# Patient Record
Sex: FEMALE | Race: WHITE | NOT HISPANIC OR LATINO | Employment: OTHER | URBAN - METROPOLITAN AREA
[De-identification: names, ages, dates, MRNs, and addresses within clinical notes are randomized per-mention and may not be internally consistent; named-entity substitution may affect disease eponyms.]

---

## 2017-03-01 DIAGNOSIS — D32.0 BENIGN NEOPLASM OF CEREBRAL MENINGES (HCC): Primary | ICD-10-CM

## 2017-04-13 ENCOUNTER — TRANSCRIBE ORDERS (OUTPATIENT)
Dept: CARDIOLOGY | Facility: CLINIC | Age: 67
End: 2017-04-13

## 2017-04-13 ENCOUNTER — TRANSCRIBE ORDERS (OUTPATIENT)
Dept: ADMINISTRATIVE | Facility: HOSPITAL | Age: 67
End: 2017-04-13

## 2017-04-13 DIAGNOSIS — Z87.891 FORMER SMOKER: Primary | ICD-10-CM

## 2017-04-13 DIAGNOSIS — R07.9 ACUTE CHEST PAIN: Primary | ICD-10-CM

## 2017-04-20 ENCOUNTER — HOSPITAL ENCOUNTER (OUTPATIENT)
Dept: CT IMAGING | Facility: HOSPITAL | Age: 67
Discharge: HOME | End: 2017-04-20
Attending: FAMILY MEDICINE

## 2017-04-20 ENCOUNTER — HOSPITAL ENCOUNTER (OUTPATIENT)
Dept: CARDIOLOGY | Facility: HOSPITAL | Age: 67
Discharge: HOME | End: 2017-04-20
Attending: FAMILY MEDICINE | Admitting: FAMILY MEDICINE

## 2017-04-20 DIAGNOSIS — Z87.891 FORMER SMOKER: ICD-10-CM

## 2017-04-20 DIAGNOSIS — R07.9 ACUTE CHEST PAIN: ICD-10-CM

## 2017-04-20 LAB
BH CV ECHO MEAS - ACS: 2 CM
BH CV ECHO MEAS - AO MAX PG: 4.2 MMHG
BH CV ECHO MEAS - AO ROOT AREA (BSA CORRECTED): 1.9
BH CV ECHO MEAS - AO ROOT AREA: 7.9 CM^2
BH CV ECHO MEAS - AO ROOT DIAM: 3.2 CM
BH CV ECHO MEAS - AO V2 MAX: 102.6 CM/SEC
BH CV ECHO MEAS - BSA(HAYCOCK): 1.7 M^2
BH CV ECHO MEAS - BSA: 1.7 M^2
BH CV ECHO MEAS - BZI_BMI: 21.1 KILOGRAMS/M^2
BH CV ECHO MEAS - BZI_METRIC_HEIGHT: 170.2 CM
BH CV ECHO MEAS - BZI_METRIC_WEIGHT: 61.2 KG
BH CV ECHO MEAS - CONTRAST EF 4CH: 55 ML/M^2
BH CV ECHO MEAS - EDV(CUBED): 125.8 ML
BH CV ECHO MEAS - EDV(MOD-SP4): 80 ML
BH CV ECHO MEAS - EDV(TEICH): 118.9 ML
BH CV ECHO MEAS - EF(CUBED): 60.2 %
BH CV ECHO MEAS - EF(MOD-SP4): 55 %
BH CV ECHO MEAS - EF(TEICH): 51.5 %
BH CV ECHO MEAS - ESV(CUBED): 50.1 ML
BH CV ECHO MEAS - ESV(MOD-SP4): 36 ML
BH CV ECHO MEAS - ESV(TEICH): 57.6 ML
BH CV ECHO MEAS - FS: 26.4 %
BH CV ECHO MEAS - IVS/LVPW: 1
BH CV ECHO MEAS - IVSD: 1.1 CM
BH CV ECHO MEAS - LAT PEAK E' VEL: 5 CM/SEC
BH CV ECHO MEAS - LV DIASTOLIC VOL/BSA (35-75): 46.8 ML/M^2
BH CV ECHO MEAS - LV MASS(C)D: 201.1 GRAMS
BH CV ECHO MEAS - LV MASS(C)DI: 117.6 GRAMS/M^2
BH CV ECHO MEAS - LV SYSTOLIC VOL/BSA (12-30): 21 ML/M^2
BH CV ECHO MEAS - LVIDD: 5 CM
BH CV ECHO MEAS - LVIDS: 3.7 CM
BH CV ECHO MEAS - LVLD AP4: 6.9 CM
BH CV ECHO MEAS - LVLS AP4: 6 CM
BH CV ECHO MEAS - LVOT AREA (M): 2.5 CM^2
BH CV ECHO MEAS - LVOT AREA: 2.6 CM^2
BH CV ECHO MEAS - LVOT DIAM: 1.8 CM
BH CV ECHO MEAS - LVPWD: 1.1 CM
BH CV ECHO MEAS - MED PEAK E' VEL: 7 CM/SEC
BH CV ECHO MEAS - MR MAX PG: 59.8 MMHG
BH CV ECHO MEAS - MR MAX VEL: 386.7 CM/SEC
BH CV ECHO MEAS - MV A DUR: 0.12 SEC
BH CV ECHO MEAS - MV A MAX VEL: 65.5 CM/SEC
BH CV ECHO MEAS - MV DEC SLOPE: 220.8 CM/SEC^2
BH CV ECHO MEAS - MV DEC TIME: 0.23 SEC
BH CV ECHO MEAS - MV E MAX VEL: 54.3 CM/SEC
BH CV ECHO MEAS - MV E/A: 0.83
BH CV ECHO MEAS - MV MAX PG: 2.1 MMHG
BH CV ECHO MEAS - MV MEAN PG: 0.7 MMHG
BH CV ECHO MEAS - MV P1/2T MAX VEL: 55.3 CM/SEC
BH CV ECHO MEAS - MV P1/2T: 73.3 MSEC
BH CV ECHO MEAS - MV V2 MAX: 71.7 CM/SEC
BH CV ECHO MEAS - MV V2 MEAN: 37.3 CM/SEC
BH CV ECHO MEAS - MV V2 VTI: 25.2 CM
BH CV ECHO MEAS - MVA P1/2T LCG: 4 CM^2
BH CV ECHO MEAS - MVA(P1/2T): 3 CM^2
BH CV ECHO MEAS - PULM A REVS DUR: 0.11 SEC
BH CV ECHO MEAS - PULM A REVS VEL: 34 CM/SEC
BH CV ECHO MEAS - PULM DIAS VEL: 65 CM/SEC
BH CV ECHO MEAS - PULM S/D: 0.62
BH CV ECHO MEAS - PULM SYS VEL: 40.3 CM/SEC
BH CV ECHO MEAS - SI(CUBED): 44.3 ML/M^2
BH CV ECHO MEAS - SI(MOD-SP4): 25.7 ML/M^2
BH CV ECHO MEAS - SI(TEICH): 35.8 ML/M^2
BH CV ECHO MEAS - SV(CUBED): 75.7 ML
BH CV ECHO MEAS - SV(MOD-SP4): 44 ML
BH CV ECHO MEAS - SV(TEICH): 61.2 ML
BH CV ECHO MEAS - TAPSE (>1.6): 2 CM2
BH CV ECHO MEAS - TR MAX VEL: 270.2 CM/SEC
BH CV STRESS DURATION MIN STAGE 1: 1
BH CV STRESS DURATION SEC STAGE 1: 24
BH CV STRESS GRADE STAGE 1: 10
BH CV STRESS HR STAGE 1: 112
BH CV STRESS METS STAGE 1: 5
BH CV STRESS PROTOCOL 1: NORMAL
BH CV STRESS SPEED STAGE 1: 1.7
BH CV STRESS STAGE 1: 1
BH CV XLRA - RV BASE: 3 CM
BH CV XLRA - TDI S': 14 CM/SEC
E/E' RATIO: 9
LEFT ATRIUM VOLUME INDEX: 26 ML/M2
LV EF 2D ECHO EST: 55 %
MAXIMAL PREDICTED HEART RATE: 155 BPM
PERCENT MAX PREDICTED HR: 72.26 %
SINUS: 3.2 CM
STJ: 2.3 CM
STRESS BASELINE BP: NORMAL MMHG
STRESS BASELINE HR: 79 BPM
STRESS PERCENT HR: 85 %
STRESS POST EXERCISE DUR MIN: 1 MIN
STRESS POST EXERCISE DUR SEC: 24 SEC
STRESS POST PEAK HR: 112 BPM
STRESS TARGET HR: 132 BPM

## 2017-04-20 PROCEDURE — 93325 DOPPLER ECHO COLOR FLOW MAPG: CPT

## 2017-04-20 PROCEDURE — 93017 CV STRESS TEST TRACING ONLY: CPT

## 2017-04-20 PROCEDURE — 93325 DOPPLER ECHO COLOR FLOW MAPG: CPT | Performed by: INTERNAL MEDICINE

## 2017-04-20 PROCEDURE — G0297 LDCT FOR LUNG CA SCREEN: HCPCS

## 2017-04-20 PROCEDURE — 93350 STRESS TTE ONLY: CPT | Performed by: INTERNAL MEDICINE

## 2017-04-20 PROCEDURE — 93320 DOPPLER ECHO COMPLETE: CPT

## 2017-04-20 PROCEDURE — 93320 DOPPLER ECHO COMPLETE: CPT | Performed by: INTERNAL MEDICINE

## 2017-04-20 PROCEDURE — 93016 CV STRESS TEST SUPVJ ONLY: CPT | Performed by: INTERNAL MEDICINE

## 2017-04-20 PROCEDURE — 93018 CV STRESS TEST I&R ONLY: CPT | Performed by: INTERNAL MEDICINE

## 2017-04-20 PROCEDURE — 93350 STRESS TTE ONLY: CPT

## 2017-04-21 ENCOUNTER — HOSPITAL ENCOUNTER (OUTPATIENT)
Dept: CARDIOLOGY | Facility: HOSPITAL | Age: 67
Discharge: HOME | End: 2017-04-21
Attending: FAMILY MEDICINE | Admitting: FAMILY MEDICINE

## 2017-04-21 ENCOUNTER — TRANSCRIBE ORDERS (OUTPATIENT)
Dept: CARDIOLOGY | Facility: CLINIC | Age: 67
End: 2017-04-21

## 2017-04-21 DIAGNOSIS — R07.89 OTHER CHEST PAIN: Primary | ICD-10-CM

## 2017-04-21 DIAGNOSIS — R07.89 OTHER CHEST PAIN: ICD-10-CM

## 2017-04-21 LAB
BH CV NUCLEAR PRIOR STUDY: 3
BH CV STRESS BP STAGE 1: NORMAL
BH CV STRESS COMMENTS STAGE 1: NORMAL
BH CV STRESS DOSE REGADENOSON STAGE 1: 0.4
BH CV STRESS DURATION MIN STAGE 1: 0
BH CV STRESS DURATION SEC STAGE 1: 15
BH CV STRESS HR STAGE 1: 114
BH CV STRESS PROTOCOL 1: NORMAL
BH CV STRESS RECOVERY BP: NORMAL MMHG
BH CV STRESS RECOVERY HR: 87 BPM
BH CV STRESS STAGE 1: 1
LV EF NUC BP: 52 %
MAXIMAL PREDICTED HEART RATE: 155 BPM
PERCENT MAX PREDICTED HR: 73.55 %
STRESS BASELINE BP: NORMAL MMHG
STRESS BASELINE HR: 56 BPM
STRESS PERCENT HR: 87 %
STRESS POST EXERCISE DUR SEC: 15 SEC
STRESS POST PEAK BP: NORMAL MMHG
STRESS POST PEAK HR: 114 BPM
STRESS TARGET HR: 132 BPM

## 2017-04-21 PROCEDURE — 0 TECHNETIUM TETROFOSMIN KIT: Performed by: FAMILY MEDICINE

## 2017-04-21 PROCEDURE — 93018 CV STRESS TEST I&R ONLY: CPT | Performed by: INTERNAL MEDICINE

## 2017-04-21 PROCEDURE — 78452 HT MUSCLE IMAGE SPECT MULT: CPT | Performed by: INTERNAL MEDICINE

## 2017-04-21 PROCEDURE — 25010000002 REGADENOSON 0.4 MG/5ML SOLUTION: Performed by: FAMILY MEDICINE

## 2017-04-21 PROCEDURE — 93017 CV STRESS TEST TRACING ONLY: CPT

## 2017-04-21 PROCEDURE — 78452 HT MUSCLE IMAGE SPECT MULT: CPT

## 2017-04-21 PROCEDURE — 93016 CV STRESS TEST SUPVJ ONLY: CPT | Performed by: INTERNAL MEDICINE

## 2017-04-21 PROCEDURE — A9502 TC99M TETROFOSMIN: HCPCS | Performed by: FAMILY MEDICINE

## 2017-04-21 RX ADMIN — REGADENOSON 0.4 MG: 0.08 INJECTION, SOLUTION INTRAVENOUS at 08:50

## 2017-04-21 RX ADMIN — TETROFOSMIN 1 DOSE: 1.38 INJECTION, POWDER, LYOPHILIZED, FOR SOLUTION INTRAVENOUS at 08:00

## 2017-04-21 RX ADMIN — TETROFOSMIN 1 DOSE: 1.38 INJECTION, POWDER, LYOPHILIZED, FOR SOLUTION INTRAVENOUS at 08:50

## 2017-04-28 ENCOUNTER — OFFICE VISIT (OUTPATIENT)
Dept: NEUROSURGERY | Facility: CLINIC | Age: 67
End: 2017-04-28

## 2017-04-28 VITALS
HEIGHT: 67 IN | WEIGHT: 145 LBS | BODY MASS INDEX: 22.76 KG/M2 | DIASTOLIC BLOOD PRESSURE: 95 MMHG | HEART RATE: 71 BPM | SYSTOLIC BLOOD PRESSURE: 150 MMHG

## 2017-04-28 DIAGNOSIS — D32.0 CEREBRAL MENINGIOMA (HCC): Primary | ICD-10-CM

## 2017-04-28 PROCEDURE — 99213 OFFICE O/P EST LOW 20 MIN: CPT | Performed by: NEUROLOGICAL SURGERY

## 2017-04-28 NOTE — PROGRESS NOTES
Subjective   Patient ID: Darling Morgan is a 66 y.o. 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    The patient denies any headaches, dizziness, seizures, fainting or black out spells.    The patient notes that she does not have her disc from [place] with her today.  She states that her PCP already went over the results of the MRI with her.  She notes that she was also seen by ophthalmology.     Brain Tumor   This is a chronic problem. The current episode started more than 1 year ago. The problem occurs rarely. The problem has been unchanged. Pertinent negatives include no fever.       The following portions of the patient's history were reviewed and updated as appropriate: allergies, current medications, past family history, past medical history, past social history, past surgical history and problem list.    Review of Systems   Constitutional: Negative for fever.   Respiratory: Negative for shortness of breath.    Neurological: Negative for seizures.   All other systems reviewed and are negative.    With her .  We have been following this small olfactory groove meningioma since 2013.  We discussed the current scan.  She is stable.  This is not a threat to her.  I reassured her.  We can get another scan in 2 years.      Objective   Physical Exam   Constitutional: She is oriented to person, place, and time. She appears well-developed and well-nourished.   HENT:   Head: Normocephalic and atraumatic.   Eyes: Conjunctivae and EOM are normal. Pupils are equal, round, and reactive to light.   Fundoscopic exam:       The right eye shows no papilledema. The right eye shows venous pulsations.        The left eye shows no papilledema. The left eye shows venous pulsations.   Neck: Carotid bruit is not present.   Neurological: She is oriented to person, place, and time. She has a normal Finger-Nose-Finger Test and a normal Heel to Shin  Test. Gait normal.   Reflex Scores:       Tricep reflexes are 2+ on the right side and 2+ on the left side.       Bicep reflexes are 2+ on the right side and 2+ on the left side.       Brachioradialis reflexes are 2+ on the right side and 2+ on the left side.       Patellar reflexes are 2+ on the right side and 2+ on the left side.       Achilles reflexes are 2+ on the right side and 2+ on the left side.  Psychiatric: Her speech is normal.     Neurologic Exam     Mental Status   Oriented to person, place, and time.   Registration of memory: Good recent and remote memory.   Attention: normal. Concentration: normal.   Speech: speech is normal   Level of consciousness: alert  Knowledge: consistent with education.     Cranial Nerves     CN II   Visual fields full to confrontation.   Visual acuity: normal    CN III, IV, VI   Pupils are equal, round, and reactive to light.  Extraocular motions are normal.     CN V   Facial sensation intact.   Right corneal reflex: normal  Left corneal reflex: normal    CN VII   Facial expression full, symmetric.   Right facial weakness: none  Left facial weakness: none    CN VIII   Hearing: intact    CN IX, X   Palate: symmetric    CN XI   Right sternocleidomastoid strength: normal  Left sternocleidomastoid strength: normal    CN XII   Tongue: not atrophic  Tongue deviation: none    Motor Exam   Muscle bulk: normal  Right arm tone: normal  Left arm tone: normal  Right leg tone: normal  Left leg tone: normal    Strength   Strength 5/5 except as noted.     Sensory Exam   Light touch normal.     Gait, Coordination, and Reflexes     Gait  Gait: normal    Coordination   Finger to nose coordination: normal  Heel to shin coordination: normal    Reflexes   Right brachioradialis: 2+  Left brachioradialis: 2+  Right biceps: 2+  Left biceps: 2+  Right triceps: 2+  Left triceps: 2+  Right patellar: 2+  Left patellar: 2+  Right achilles: 2+  Left achilles: 2+  Right : 2+  Left :  2+      Assessment/Plan   Independent Review of Radiographic Studies:    The latest MRI did show a stable olfactory groove meningioma, about 1.6 cm.  Agree with the report.      Medical Decision Making:    Clinically and radiographically stable.  We will see her in 2 years with another MRI.    Darling was seen today for brain tumor.    Diagnoses and all orders for this visit:    Cerebral meningioma  -     Cancel: MRI Brain With & Without Contrast; Future  -     MRI Brain Without Contrast; Future    Return in about 2 years (around 4/28/2019).

## 2017-05-10 ENCOUNTER — OFFICE VISIT (OUTPATIENT)
Dept: CARDIOLOGY | Facility: CLINIC | Age: 67
End: 2017-05-10

## 2017-05-10 VITALS
HEART RATE: 60 BPM | HEIGHT: 67 IN | WEIGHT: 147.2 LBS | BODY MASS INDEX: 23.1 KG/M2 | SYSTOLIC BLOOD PRESSURE: 140 MMHG | DIASTOLIC BLOOD PRESSURE: 84 MMHG

## 2017-05-10 DIAGNOSIS — R07.89 OTHER CHEST PAIN: Primary | ICD-10-CM

## 2017-05-10 DIAGNOSIS — R94.39 EQUIVOCAL STRESS TEST: ICD-10-CM

## 2017-05-10 DIAGNOSIS — I10 ESSENTIAL HYPERTENSION: ICD-10-CM

## 2017-05-10 PROCEDURE — 93000 ELECTROCARDIOGRAM COMPLETE: CPT | Performed by: INTERNAL MEDICINE

## 2017-05-10 PROCEDURE — 99204 OFFICE O/P NEW MOD 45 MIN: CPT | Performed by: INTERNAL MEDICINE

## 2017-05-10 RX ORDER — NALTREXONE HYDROCHLORIDE 50 MG/1
50 TABLET, FILM COATED ORAL DAILY
COMMUNITY
End: 2020-04-22 | Stop reason: ALTCHOICE

## 2017-05-10 RX ORDER — ESCITALOPRAM OXALATE 10 MG/1
10 TABLET ORAL DAILY
COMMUNITY
End: 2022-06-20

## 2017-05-10 RX ORDER — DIAZEPAM 10 MG/1
10 TABLET ORAL AS NEEDED
COMMUNITY
End: 2020-04-22 | Stop reason: ALTCHOICE

## 2017-05-10 RX ORDER — OMEPRAZOLE 20 MG/1
40 CAPSULE, DELAYED RELEASE ORAL DAILY
COMMUNITY

## 2017-05-15 ENCOUNTER — HOSPITAL ENCOUNTER (OUTPATIENT)
Dept: CARDIOLOGY | Facility: HOSPITAL | Age: 67
Discharge: HOME | End: 2017-05-15
Attending: INTERNAL MEDICINE | Admitting: INTERNAL MEDICINE

## 2017-05-15 DIAGNOSIS — R07.89 OTHER CHEST PAIN: ICD-10-CM

## 2017-05-15 DIAGNOSIS — R94.39 ABNORMAL STRESS TEST: Primary | ICD-10-CM

## 2017-05-15 DIAGNOSIS — R94.39 EQUIVOCAL STRESS TEST: ICD-10-CM

## 2017-05-15 DIAGNOSIS — R07.2 PRECORDIAL PAIN: ICD-10-CM

## 2017-05-15 LAB
BH CV NUCLEAR PRIOR STUDY: 1
BH CV STRESS BP STAGE 1: NORMAL
BH CV STRESS COMMENTS STAGE 1: NORMAL
BH CV STRESS DOSE REGADENOSON STAGE 1: 0.4
BH CV STRESS DURATION MIN STAGE 1: 0
BH CV STRESS DURATION SEC STAGE 1: 15
BH CV STRESS HR STAGE 1: 82
BH CV STRESS PROTOCOL 1: NORMAL
BH CV STRESS RECOVERY BP: NORMAL MMHG
BH CV STRESS RECOVERY HR: 75 BPM
BH CV STRESS STAGE 1: 1
LV EF NUC BP: 69 %
MAXIMAL PREDICTED HEART RATE: 155 BPM
PERCENT MAX PREDICTED HR: 52.9 %
STRESS BASELINE BP: NORMAL MMHG
STRESS BASELINE HR: 58 BPM
STRESS PERCENT HR: 62 %
STRESS POST EXERCISE DUR SEC: 15 SEC
STRESS POST PEAK BP: NORMAL MMHG
STRESS POST PEAK HR: 82 BPM
STRESS TARGET HR: 132 BPM

## 2017-05-15 PROCEDURE — A9555 RB82 RUBIDIUM: HCPCS | Performed by: INTERNAL MEDICINE

## 2017-05-15 PROCEDURE — 93017 CV STRESS TEST TRACING ONLY: CPT

## 2017-05-15 PROCEDURE — 78492 MYOCRD IMG PET MLT RST&STRS: CPT | Performed by: INTERNAL MEDICINE

## 2017-05-15 PROCEDURE — 25010000002 REGADENOSON 0.4 MG/5ML SOLUTION: Performed by: INTERNAL MEDICINE

## 2017-05-15 PROCEDURE — 0 RUBIDIUM CHLORIDE: Performed by: INTERNAL MEDICINE

## 2017-05-15 PROCEDURE — 93016 CV STRESS TEST SUPVJ ONLY: CPT | Performed by: INTERNAL MEDICINE

## 2017-05-15 PROCEDURE — 78492 MYOCRD IMG PET MLT RST&STRS: CPT

## 2017-05-15 PROCEDURE — 93018 CV STRESS TEST I&R ONLY: CPT | Performed by: INTERNAL MEDICINE

## 2017-05-15 RX ADMIN — RUBIDIUM CHLORIDE RB-82 1 DOSE: 150 INJECTION, SOLUTION INTRAVENOUS at 12:46

## 2017-05-15 RX ADMIN — REGADENOSON 0.4 MG: 0.08 INJECTION, SOLUTION INTRAVENOUS at 12:45

## 2017-05-15 RX ADMIN — RUBIDIUM CHLORIDE RB-82 1 DOSE: 150 INJECTION, SOLUTION INTRAVENOUS at 12:35

## 2017-05-23 ENCOUNTER — TRANSCRIBE ORDERS (OUTPATIENT)
Dept: CARDIOLOGY | Facility: CLINIC | Age: 67
End: 2017-05-23

## 2017-05-23 ENCOUNTER — LAB (OUTPATIENT)
Dept: LAB | Facility: HOSPITAL | Age: 67
End: 2017-05-23
Attending: INTERNAL MEDICINE

## 2017-05-23 DIAGNOSIS — Z13.6 SCREENING FOR ISCHEMIC HEART DISEASE: ICD-10-CM

## 2017-05-23 DIAGNOSIS — Z01.810 PRE-OPERATIVE CARDIOVASCULAR EXAMINATION: ICD-10-CM

## 2017-05-23 DIAGNOSIS — Z01.810 PRE-OPERATIVE CARDIOVASCULAR EXAMINATION: Primary | ICD-10-CM

## 2017-05-23 LAB
ANION GAP SERPL CALCULATED.3IONS-SCNC: 13.5 MMOL/L
BASOPHILS # BLD AUTO: 0.03 10*3/MM3 (ref 0–0.2)
BASOPHILS NFR BLD AUTO: 0.5 % (ref 0–1.5)
BUN BLD-MCNC: 12 MG/DL (ref 8–23)
BUN/CREAT SERPL: 14.3 (ref 7–25)
CALCIUM SPEC-SCNC: 10 MG/DL (ref 8.6–10.5)
CHLORIDE SERPL-SCNC: 101 MMOL/L (ref 98–107)
CO2 SERPL-SCNC: 26.5 MMOL/L (ref 22–29)
CREAT BLD-MCNC: 0.84 MG/DL (ref 0.57–1)
DEPRECATED RDW RBC AUTO: 48.3 FL (ref 37–54)
EOSINOPHIL # BLD AUTO: 0.17 10*3/MM3 (ref 0–0.7)
EOSINOPHIL NFR BLD AUTO: 2.6 % (ref 0.3–6.2)
ERYTHROCYTE [DISTWIDTH] IN BLOOD BY AUTOMATED COUNT: 14.4 % (ref 11.7–13)
GFR SERPL CREATININE-BSD FRML MDRD: 68 ML/MIN/1.73
GLUCOSE BLD-MCNC: 97 MG/DL (ref 65–99)
HCT VFR BLD AUTO: 38.3 % (ref 35.6–45.5)
HGB BLD-MCNC: 12.8 G/DL (ref 11.9–15.5)
IMM GRANULOCYTES # BLD: 0 10*3/MM3 (ref 0–0.03)
IMM GRANULOCYTES NFR BLD: 0 % (ref 0–0.5)
LYMPHOCYTES # BLD AUTO: 2.09 10*3/MM3 (ref 0.9–4.8)
LYMPHOCYTES NFR BLD AUTO: 31.7 % (ref 19.6–45.3)
MCH RBC QN AUTO: 30.7 PG (ref 26.9–32)
MCHC RBC AUTO-ENTMCNC: 33.4 G/DL (ref 32.4–36.3)
MCV RBC AUTO: 91.8 FL (ref 80.5–98.2)
MONOCYTES # BLD AUTO: 0.58 10*3/MM3 (ref 0.2–1.2)
MONOCYTES NFR BLD AUTO: 8.8 % (ref 5–12)
NEUTROPHILS # BLD AUTO: 3.72 10*3/MM3 (ref 1.9–8.1)
NEUTROPHILS NFR BLD AUTO: 56.4 % (ref 42.7–76)
PLATELET # BLD AUTO: 336 10*3/MM3 (ref 140–500)
PMV BLD AUTO: 9.1 FL (ref 6–12)
POTASSIUM BLD-SCNC: 4 MMOL/L (ref 3.5–5.2)
RBC # BLD AUTO: 4.17 10*6/MM3 (ref 3.9–5.2)
SODIUM BLD-SCNC: 141 MMOL/L (ref 136–145)
WBC NRBC COR # BLD: 6.59 10*3/MM3 (ref 4.5–10.7)

## 2017-05-23 PROCEDURE — 36415 COLL VENOUS BLD VENIPUNCTURE: CPT

## 2017-05-23 PROCEDURE — 80048 BASIC METABOLIC PNL TOTAL CA: CPT

## 2017-05-23 PROCEDURE — 85025 COMPLETE CBC W/AUTO DIFF WBC: CPT

## 2017-05-26 ENCOUNTER — HOSPITAL ENCOUNTER (OUTPATIENT)
Facility: HOSPITAL | Age: 67
Setting detail: HOSPITAL OUTPATIENT SURGERY
Discharge: HOME | End: 2017-05-26
Attending: INTERNAL MEDICINE | Admitting: INTERNAL MEDICINE

## 2017-05-26 VITALS
HEART RATE: 58 BPM | RESPIRATION RATE: 16 BRPM | TEMPERATURE: 98.6 F | BODY MASS INDEX: 22.6 KG/M2 | WEIGHT: 144 LBS | DIASTOLIC BLOOD PRESSURE: 81 MMHG | HEIGHT: 67 IN | SYSTOLIC BLOOD PRESSURE: 148 MMHG | OXYGEN SATURATION: 94 %

## 2017-05-26 DIAGNOSIS — R94.39 ABNORMAL STRESS TEST: ICD-10-CM

## 2017-05-26 DIAGNOSIS — R07.2 PRECORDIAL PAIN: ICD-10-CM

## 2017-05-26 PROCEDURE — 93458 L HRT ARTERY/VENTRICLE ANGIO: CPT | Performed by: INTERNAL MEDICINE

## 2017-05-26 PROCEDURE — 25010000002 FENTANYL CITRATE (PF) 100 MCG/2ML SOLUTION: Performed by: INTERNAL MEDICINE

## 2017-05-26 PROCEDURE — 20612 ASPIRATE/INJ GANGLION CYST: CPT | Performed by: INTERNAL MEDICINE

## 2017-05-26 PROCEDURE — 25010000002 HEPARIN (PORCINE) PER 1000 UNITS: Performed by: INTERNAL MEDICINE

## 2017-05-26 PROCEDURE — 0 IOPAMIDOL PER 1 ML: Performed by: INTERNAL MEDICINE

## 2017-05-26 PROCEDURE — 25010000002 MIDAZOLAM PER 1 MG: Performed by: INTERNAL MEDICINE

## 2017-05-26 PROCEDURE — C1894 INTRO/SHEATH, NON-LASER: HCPCS | Performed by: INTERNAL MEDICINE

## 2017-05-26 PROCEDURE — C1769 GUIDE WIRE: HCPCS | Performed by: INTERNAL MEDICINE

## 2017-05-26 RX ORDER — SODIUM CHLORIDE 9 MG/ML
100 INJECTION, SOLUTION INTRAVENOUS CONTINUOUS
Status: DISCONTINUED | OUTPATIENT
Start: 2017-05-26 | End: 2017-05-26 | Stop reason: HOSPADM

## 2017-05-26 RX ORDER — SODIUM CHLORIDE 9 MG/ML
75 INJECTION, SOLUTION INTRAVENOUS CONTINUOUS
Status: DISCONTINUED | OUTPATIENT
Start: 2017-05-26 | End: 2017-05-26 | Stop reason: HOSPADM

## 2017-05-26 RX ORDER — MIDAZOLAM HYDROCHLORIDE 1 MG/ML
INJECTION INTRAMUSCULAR; INTRAVENOUS AS NEEDED
Status: DISCONTINUED | OUTPATIENT
Start: 2017-05-26 | End: 2017-05-26 | Stop reason: HOSPADM

## 2017-05-26 RX ORDER — SODIUM CHLORIDE 0.9 % (FLUSH) 0.9 %
1-10 SYRINGE (ML) INJECTION AS NEEDED
Status: DISCONTINUED | OUTPATIENT
Start: 2017-05-26 | End: 2017-05-26 | Stop reason: HOSPADM

## 2017-05-26 RX ORDER — LIDOCAINE HYDROCHLORIDE 10 MG/ML
0.1 INJECTION, SOLUTION EPIDURAL; INFILTRATION; INTRACAUDAL; PERINEURAL ONCE AS NEEDED
Status: DISCONTINUED | OUTPATIENT
Start: 2017-05-26 | End: 2017-05-26 | Stop reason: HOSPADM

## 2017-05-26 RX ORDER — FENTANYL CITRATE 50 UG/ML
INJECTION, SOLUTION INTRAMUSCULAR; INTRAVENOUS AS NEEDED
Status: DISCONTINUED | OUTPATIENT
Start: 2017-05-26 | End: 2017-05-26 | Stop reason: HOSPADM

## 2017-05-26 RX ORDER — LIDOCAINE HYDROCHLORIDE 20 MG/ML
INJECTION, SOLUTION INFILTRATION; PERINEURAL AS NEEDED
Status: DISCONTINUED | OUTPATIENT
Start: 2017-05-26 | End: 2017-05-26 | Stop reason: HOSPADM

## 2017-05-26 RX ADMIN — SODIUM CHLORIDE 75 ML/HR: 9 INJECTION, SOLUTION INTRAVENOUS at 10:02

## 2020-04-21 ENCOUNTER — TELEPHONE (OUTPATIENT)
Dept: CARDIOLOGY | Facility: CLINIC | Age: 70
End: 2020-04-21

## 2020-04-21 NOTE — TELEPHONE ENCOUNTER
Inbound call from Dr Keita.  He saw this patient in office today and he did an EKG and she was found to be in Afib.  She is also being seen for extreme fatigue, which he feels like could be an underlying blockage, as she is a heavy smoker.  She is also c/o her heart racing and dizziness  He is concerned and wanted her to be evaluated in office, instead of by phone.    This is a Dr Mercedes patient who has not been seen since 5/10/17.  She was scheduled with Dr Mercedes for a video visit on 4/23 and Dr Keita would like this patient to be evaluated sooner.  I have added her to Dr Fields's scheduled for tomorrow.  Thanks  Keren Rutherford RN  Triage nurse    .

## 2020-04-22 ENCOUNTER — HOSPITAL ENCOUNTER (OUTPATIENT)
Dept: CARDIOLOGY | Facility: HOSPITAL | Age: 70
Discharge: HOME | End: 2020-04-22
Admitting: INTERNAL MEDICINE

## 2020-04-22 ENCOUNTER — OFFICE VISIT (OUTPATIENT)
Dept: CARDIOLOGY | Facility: CLINIC | Age: 70
End: 2020-04-22

## 2020-04-22 VITALS
HEIGHT: 67 IN | BODY MASS INDEX: 21.12 KG/M2 | HEART RATE: 81 BPM | WEIGHT: 134.6 LBS | OXYGEN SATURATION: 98 % | DIASTOLIC BLOOD PRESSURE: 90 MMHG | SYSTOLIC BLOOD PRESSURE: 158 MMHG

## 2020-04-22 DIAGNOSIS — R00.2 PALPITATIONS: ICD-10-CM

## 2020-04-22 DIAGNOSIS — R00.2 PALPITATIONS: Primary | ICD-10-CM

## 2020-04-22 LAB
AORTIC ARCH: 2.2 CM
AORTIC ROOT ANNULUS: 2.1 CM
ASCENDING AORTA: 3.4 CM
BH CV ECHO MEAS - ACS: 1.9 CM
BH CV ECHO MEAS - AO MAX PG (FULL): 2.7 MMHG
BH CV ECHO MEAS - AO MAX PG: 4.5 MMHG
BH CV ECHO MEAS - AO MEAN PG (FULL): 1.7 MMHG
BH CV ECHO MEAS - AO MEAN PG: 2.8 MMHG
BH CV ECHO MEAS - AO ROOT AREA (BSA CORRECTED): 2
BH CV ECHO MEAS - AO ROOT AREA: 9.5 CM^2
BH CV ECHO MEAS - AO ROOT DIAM: 3.5 CM
BH CV ECHO MEAS - AO V2 MAX: 106.4 CM/SEC
BH CV ECHO MEAS - AO V2 MEAN: 78.9 CM/SEC
BH CV ECHO MEAS - AO V2 VTI: 23.5 CM
BH CV ECHO MEAS - ASC AORTA: 3.4 CM
BH CV ECHO MEAS - AVA(I,A): 1.8 CM^2
BH CV ECHO MEAS - AVA(I,D): 1.8 CM^2
BH CV ECHO MEAS - AVA(V,A): 1.9 CM^2
BH CV ECHO MEAS - AVA(V,D): 1.9 CM^2
BH CV ECHO MEAS - BSA(HAYCOCK): 1.7 M^2
BH CV ECHO MEAS - BSA: 1.7 M^2
BH CV ECHO MEAS - BZI_BMI: 21 KILOGRAMS/M^2
BH CV ECHO MEAS - BZI_METRIC_HEIGHT: 170.2 CM
BH CV ECHO MEAS - BZI_METRIC_WEIGHT: 60.8 KG
BH CV ECHO MEAS - EDV(MOD-SP2): 50 ML
BH CV ECHO MEAS - EDV(MOD-SP4): 66 ML
BH CV ECHO MEAS - EDV(TEICH): 112.2 ML
BH CV ECHO MEAS - EF(CUBED): 72.3 %
BH CV ECHO MEAS - EF(MOD-BP): 57 %
BH CV ECHO MEAS - EF(MOD-SP2): 56 %
BH CV ECHO MEAS - EF(MOD-SP4): 62.1 %
BH CV ECHO MEAS - EF(TEICH): 63.9 %
BH CV ECHO MEAS - ESV(MOD-SP2): 22 ML
BH CV ECHO MEAS - ESV(MOD-SP4): 25 ML
BH CV ECHO MEAS - ESV(TEICH): 40.5 ML
BH CV ECHO MEAS - FS: 34.8 %
BH CV ECHO MEAS - IVS/LVPW: 0.94
BH CV ECHO MEAS - IVSD: 1.1 CM
BH CV ECHO MEAS - LAT PEAK E' VEL: 7 CM/SEC
BH CV ECHO MEAS - LV DIASTOLIC VOL/BSA (35-75): 38.7 ML/M^2
BH CV ECHO MEAS - LV MASS(C)D: 203.3 GRAMS
BH CV ECHO MEAS - LV MASS(C)DI: 119.2 GRAMS/M^2
BH CV ECHO MEAS - LV MAX PG: 1.8 MMHG
BH CV ECHO MEAS - LV MEAN PG: 1 MMHG
BH CV ECHO MEAS - LV SYSTOLIC VOL/BSA (12-30): 14.7 ML/M^2
BH CV ECHO MEAS - LV V1 MAX: 67.9 CM/SEC
BH CV ECHO MEAS - LV V1 MEAN: 47.8 CM/SEC
BH CV ECHO MEAS - LV V1 VTI: 13.9 CM
BH CV ECHO MEAS - LVIDD: 4.9 CM
BH CV ECHO MEAS - LVIDS: 3.2 CM
BH CV ECHO MEAS - LVLD AP2: 6.3 CM
BH CV ECHO MEAS - LVLD AP4: 6.3 CM
BH CV ECHO MEAS - LVLS AP2: 5.2 CM
BH CV ECHO MEAS - LVLS AP4: 5.8 CM
BH CV ECHO MEAS - LVOT AREA (M): 3.1 CM^2
BH CV ECHO MEAS - LVOT AREA: 3 CM^2
BH CV ECHO MEAS - LVOT DIAM: 2 CM
BH CV ECHO MEAS - LVPWD: 1.1 CM
BH CV ECHO MEAS - MED PEAK E' VEL: 6 CM/SEC
BH CV ECHO MEAS - MR MAX PG: 73.7 MMHG
BH CV ECHO MEAS - MR MAX VEL: 429.4 CM/SEC
BH CV ECHO MEAS - MV A DUR: 0.07 SEC
BH CV ECHO MEAS - MV A MAX VEL: 72.2 CM/SEC
BH CV ECHO MEAS - MV DEC SLOPE: 550.7 CM/SEC^2
BH CV ECHO MEAS - MV DEC TIME: 0.13 SEC
BH CV ECHO MEAS - MV E MAX VEL: 71.6 CM/SEC
BH CV ECHO MEAS - MV E/A: 0.99
BH CV ECHO MEAS - MV MAX PG: 3.3 MMHG
BH CV ECHO MEAS - MV MEAN PG: 1.4 MMHG
BH CV ECHO MEAS - MV P1/2T MAX VEL: 86.3 CM/SEC
BH CV ECHO MEAS - MV P1/2T: 45.9 MSEC
BH CV ECHO MEAS - MV V2 MAX: 90.2 CM/SEC
BH CV ECHO MEAS - MV V2 MEAN: 55.2 CM/SEC
BH CV ECHO MEAS - MV V2 VTI: 27.3 CM
BH CV ECHO MEAS - MVA P1/2T LCG: 2.5 CM^2
BH CV ECHO MEAS - MVA(P1/2T): 4.8 CM^2
BH CV ECHO MEAS - MVA(VTI): 1.5 CM^2
BH CV ECHO MEAS - PA ACC TIME: 0.11 SEC
BH CV ECHO MEAS - PA MAX PG (FULL): 0.61 MMHG
BH CV ECHO MEAS - PA MAX PG: 2.5 MMHG
BH CV ECHO MEAS - PA PR(ACCEL): 27.9 MMHG
BH CV ECHO MEAS - PA V2 MAX: 78.5 CM/SEC
BH CV ECHO MEAS - PULM A REVS DUR: 0.08 SEC
BH CV ECHO MEAS - PULM A REVS VEL: 28.7 CM/SEC
BH CV ECHO MEAS - PULM DIAS VEL: 42.4 CM/SEC
BH CV ECHO MEAS - PULM S/D: 1
BH CV ECHO MEAS - PULM SYS VEL: 42.4 CM/SEC
BH CV ECHO MEAS - PVA(V,A): 2.1 CM^2
BH CV ECHO MEAS - PVA(V,D): 2.1 CM^2
BH CV ECHO MEAS - QP/QS: 0.93
BH CV ECHO MEAS - RAP SYSTOLE: 3 MMHG
BH CV ECHO MEAS - RV MAX PG: 1.9 MMHG
BH CV ECHO MEAS - RV MEAN PG: 1.3 MMHG
BH CV ECHO MEAS - RV V1 MAX: 68.1 CM/SEC
BH CV ECHO MEAS - RV V1 MEAN: 54.3 CM/SEC
BH CV ECHO MEAS - RV V1 VTI: 16.5 CM
BH CV ECHO MEAS - RVOT AREA: 2.4 CM^2
BH CV ECHO MEAS - RVOT DIAM: 1.7 CM
BH CV ECHO MEAS - RVSP: 32.7 MMHG
BH CV ECHO MEAS - SI(AO): 130.9 ML/M^2
BH CV ECHO MEAS - SI(CUBED): 49.6 ML/M^2
BH CV ECHO MEAS - SI(LVOT): 24.4 ML/M^2
BH CV ECHO MEAS - SI(MOD-SP2): 16.4 ML/M^2
BH CV ECHO MEAS - SI(MOD-SP4): 24 ML/M^2
BH CV ECHO MEAS - SI(TEICH): 42.1 ML/M^2
BH CV ECHO MEAS - SUP REN AO DIAM: 2 CM
BH CV ECHO MEAS - SV(AO): 223.2 ML
BH CV ECHO MEAS - SV(CUBED): 84.6 ML
BH CV ECHO MEAS - SV(LVOT): 41.7 ML
BH CV ECHO MEAS - SV(MOD-SP2): 28 ML
BH CV ECHO MEAS - SV(MOD-SP4): 41 ML
BH CV ECHO MEAS - SV(RVOT): 39 ML
BH CV ECHO MEAS - SV(TEICH): 71.7 ML
BH CV ECHO MEAS - TAPSE (>1.6): 2.2 CM2
BH CV ECHO MEAS - TR MAX VEL: 272.3 CM/SEC
BH CV ECHO MEAS - TV MAX PG: 18.4 MMHG
BH CV ECHO MEAS - TV V2 MAX: 214.5 CM/SEC
BH CV ECHO MEASUREMENTS AVERAGE E/E' RATIO: 11.02
BH CV XLRA - RV BASE: 2.2 CM
BH CV XLRA - RV LENGTH: 5.4 CM
BH CV XLRA - RV MID: 2.2 CM
BH CV XLRA - TDI S': 10 CM/SEC
LEFT ATRIUM VOLUME INDEX: 15 ML/M2
SINUS: 2.9 CM
STJ: 3.2 CM

## 2020-04-22 PROCEDURE — 93306 TTE W/DOPPLER COMPLETE: CPT | Performed by: INTERNAL MEDICINE

## 2020-04-22 PROCEDURE — 93306 TTE W/DOPPLER COMPLETE: CPT

## 2020-04-22 PROCEDURE — 99214 OFFICE O/P EST MOD 30 MIN: CPT | Performed by: INTERNAL MEDICINE

## 2022-03-07 ENCOUNTER — TRANSCRIBE ORDERS (OUTPATIENT)
Dept: ADMINISTRATIVE | Facility: HOSPITAL | Age: 72
End: 2022-03-07

## 2022-03-07 DIAGNOSIS — Z12.2 SCREENING FOR LUNG CANCER: ICD-10-CM

## 2022-03-07 DIAGNOSIS — R42 DIZZINESS: Primary | ICD-10-CM

## 2022-03-29 ENCOUNTER — TRANSCRIBE ORDERS (OUTPATIENT)
Dept: ADMINISTRATIVE | Facility: HOSPITAL | Age: 72
End: 2022-03-29

## 2022-03-29 DIAGNOSIS — D32.9 MENINGIOMA: Primary | ICD-10-CM

## 2022-05-05 ENCOUNTER — TELEPHONE (OUTPATIENT)
Dept: SURGERY | Facility: CLINIC | Age: 72
End: 2022-05-05

## 2022-05-05 NOTE — TELEPHONE ENCOUNTER
Caller: Darling Morgan    Relationship to patient: Self    Best call back number: [phone]     Chief complaint: NEEDS APPT-REF IN [place] REVIEW    Type of visit: NEW PT    Requested date: ASAP    If rescheduling, when is the original appointment: N/A    Additional notes: PTS REF WAS SENT THROUGH [facility]-PT CALLED TO CHECK ON REF 5/4/22-CREATED REF BUT HAD TO SEND TO REVIEW PER HUB WORKFLOW-PT CALLED BACK THIS MORNING 5/5/22 TO CHECK ON REF-REF STILL IN REVIEW

## 2022-05-19 ENCOUNTER — OFFICE VISIT (OUTPATIENT)
Dept: SURGERY | Facility: CLINIC | Age: 72
End: 2022-05-19

## 2022-05-19 VITALS
HEIGHT: 67 IN | WEIGHT: 121 LBS | HEART RATE: 68 BPM | BODY MASS INDEX: 18.99 KG/M2 | DIASTOLIC BLOOD PRESSURE: 82 MMHG | SYSTOLIC BLOOD PRESSURE: 122 MMHG | OXYGEN SATURATION: 98 %

## 2022-05-19 DIAGNOSIS — R91.1 LUNG NODULE: Primary | ICD-10-CM

## 2022-05-19 DIAGNOSIS — J44.9 CHRONIC OBSTRUCTIVE PULMONARY DISEASE, UNSPECIFIED COPD TYPE: ICD-10-CM

## 2022-05-19 PROCEDURE — 99202 OFFICE O/P NEW SF 15 MIN: CPT | Performed by: THORACIC SURGERY (CARDIOTHORACIC VASCULAR SURGERY)

## 2022-05-19 RX ORDER — DEXTROAMPHETAMINE SACCHARATE, AMPHETAMINE ASPARTATE, DEXTROAMPHETAMINE SULFATE AND AMPHETAMINE SULFATE 5; 5; 5; 5 MG/1; MG/1; MG/1; MG/1
20 TABLET ORAL DAILY
COMMUNITY
End: 2022-08-05

## 2022-05-19 NOTE — PROGRESS NOTES
"Chief Complaint  Lung nodule    Subjective          Darling Morgan presents to [facility] THORACIC SURGERY  History of Present Illness     Ms Morgan is a 71-year-old  female who presented with dizzy spells.  She underwent an extensive work-up which ruled out hypoglycemia and cardiac problems.  She did have some bigeminy but this was not thought to be related to the dizziness.  Because of her smoking history she underwent a low-dose screening CT scan.  This showed a nodule in the right lower lobe of the lung.  She has been referred here for further evaluation of this nodule.    She began smoking in her early 20s.  She has smoked as much is 1 pack of cigarettes per day.  She continues to smoke now.  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  She gets short of breath with exertion but her exertion is limited due to her peripheral vascular disease.  She has no prior history of cancer.  She has no history of myocardial infarction or hypertension.  There is no history of diabetes.    Objective   Vital Signs:  /82 (BP Location: Right arm, Patient Position: Sitting, Cuff Size: Adult)   Pulse 68   Ht 170.2 cm (67\")   Wt 54.9 kg (121 lb)   SpO2 98%   BMI 18.95 kg/m²   BMI is within normal parameters. No other follow-up for BMI required.      Physical Exam     Neck: There are no masses and no cervical or supraclavicular adenopathy    Pulmonary: Lungs are clear to auscultation with equal breath sounds bilaterally    Cardiac: Regular rate and rhythm.  No murmurs or gallops.  No dependent edema.    Abdomen: Soft and nontender.  No masses or organomegaly.  Good bowel sounds in all quadrants.    Result Review :   The following data was reviewed by: Bharathi Quinones III, MD on 05/19/2022:      CT scan of the chest performed April 25, 2022 was independently reviewed.  Patient has diffuse changes of COPD and emphysema.  There is a bleb in the peripheral aspect of the right " lower lobe that has a nodular component.  This nodule measures 9 mm.  There are a few scattered micronodules in the base of the left lung.  No other infiltrates nodules or masses.  No suspicious hilar or mediastinal adenopathy.  No chest wall abnormalities.  Upper abdomen is unremarkable.         Assessment and Plan    Diagnoses and all orders for this visit:    1. Lung nodule (Primary)  -     NM PET/CT Skull Base to Mid Thigh; Future    2. Chronic obstructive pulmonary disease, unspecified COPD type (HCC)  -     Full Pulmonary Function Test With Bronchodilator & ABG; Future    This nodule is concerning for primary lung cancer especially given her extensive smoking history.  Being located that far in the periphery bronchoscopy would be unlikely to give us a diagnosis.  Sitting on top of this blab patient is at high risk for pneumothorax or hemorrhage from a CT directed needle biopsy.  I have recommended a CT PET scan to help characterize these nodules.  I have also ordered pulmonary function tests to assess her operability.  If these nodules are PET positive and pulmonary function tests are suitable I would favor a wedge resection.  If she cannot tolerate pulmonary surgery then we may want to consider stereotactic radiation therapy.  Once her testing is complete the patient will return here to discuss the results and to formulate a treatment plan.  I will keep you informed of her progress.  Thank you for allowing us to participate in the care of Ms. Morgan.       I spent 26 minutes caring for Darling on this date of service. This time includes time spent by me in the following activities:preparing for the visit, reviewing tests, performing a medically appropriate examination and/or evaluation , counseling and educating the patient/family/caregiver, ordering medications, tests, or procedures, referring and communicating with other health care professionals , documenting information in the medical record, independently  interpreting results and communicating that information with the patient/family/caregiver and care coordination  Follow Up   Return for Return to office with test results.  Patient was given instructions and counseling regarding her condition or for health maintenance advice. Please see specific information pulled into the AVS if appropriate.

## 2022-05-20 ENCOUNTER — TRANSCRIBE ORDERS (OUTPATIENT)
Dept: ADMINISTRATIVE | Facility: HOSPITAL | Age: 72
End: 2022-05-20

## 2022-05-20 DIAGNOSIS — Z01.818 OTHER SPECIFIED PRE-OPERATIVE EXAMINATION: Primary | ICD-10-CM

## 2022-06-08 ENCOUNTER — LAB (OUTPATIENT)
Dept: LAB | Facility: HOSPITAL | Age: 72
End: 2022-06-08

## 2022-06-08 ENCOUNTER — APPOINTMENT (OUTPATIENT)
Dept: LAB | Facility: HOSPITAL | Age: 72
End: 2022-06-08

## 2022-06-08 DIAGNOSIS — Z01.818 OTHER SPECIFIED PRE-OPERATIVE EXAMINATION: ICD-10-CM

## 2022-06-08 LAB — SARS-COV-2 ORF1AB RESP QL NAA+PROBE: DETECTED

## 2022-06-08 PROCEDURE — U0005 INFEC AGEN DETEC AMPLI PROBE: HCPCS

## 2022-06-08 PROCEDURE — U0004 COV-19 TEST NON-CDC HGH THRU: HCPCS

## 2022-06-08 PROCEDURE — C9803 HOPD COVID-19 SPEC COLLECT: HCPCS

## 2022-06-10 ENCOUNTER — APPOINTMENT (OUTPATIENT)
Dept: RESPIRATORY THERAPY | Facility: HOSPITAL | Age: 72
End: 2022-06-10

## 2022-06-10 ENCOUNTER — HOSPITAL ENCOUNTER (OUTPATIENT)
Dept: PET IMAGING | Facility: HOSPITAL | Age: 72
Discharge: HOME | End: 2022-06-10

## 2022-06-10 DIAGNOSIS — R91.1 LUNG NODULE: ICD-10-CM

## 2022-06-10 LAB — GLUCOSE BLDC GLUCOMTR-MCNC: 105 MG/DL (ref 70–130)

## 2022-06-10 PROCEDURE — 0 FLUDEOXYGLUCOSE F18 SOLUTION: Performed by: THORACIC SURGERY (CARDIOTHORACIC VASCULAR SURGERY)

## 2022-06-10 PROCEDURE — 78815 PET IMAGE W/CT SKULL-THIGH: CPT

## 2022-06-10 PROCEDURE — A9552 F18 FDG: HCPCS | Performed by: THORACIC SURGERY (CARDIOTHORACIC VASCULAR SURGERY)

## 2022-06-10 PROCEDURE — 82962 GLUCOSE BLOOD TEST: CPT

## 2022-06-10 RX ADMIN — FLUDEOXYGLUCOSE F18 1 DOSE: 300 INJECTION INTRAVENOUS at 10:20

## 2022-06-15 ENCOUNTER — OFFICE VISIT (OUTPATIENT)
Dept: SURGERY | Facility: CLINIC | Age: 72
End: 2022-06-15

## 2022-06-15 ENCOUNTER — PREP FOR SURGERY (OUTPATIENT)
Dept: OTHER | Facility: HOSPITAL | Age: 72
End: 2022-06-15

## 2022-06-15 VITALS
OXYGEN SATURATION: 97 % | HEART RATE: 58 BPM | SYSTOLIC BLOOD PRESSURE: 142 MMHG | BODY MASS INDEX: 18.83 KG/M2 | DIASTOLIC BLOOD PRESSURE: 80 MMHG | HEIGHT: 67 IN | WEIGHT: 120 LBS

## 2022-06-15 DIAGNOSIS — R91.1 LUNG NODULE: Primary | ICD-10-CM

## 2022-06-15 DIAGNOSIS — R79.1 ABNORMAL COAGULATION PROFILE: ICD-10-CM

## 2022-06-15 DIAGNOSIS — J44.9 CHRONIC OBSTRUCTIVE PULMONARY DISEASE, UNSPECIFIED COPD TYPE: ICD-10-CM

## 2022-06-15 PROCEDURE — 99214 OFFICE O/P EST MOD 30 MIN: CPT | Performed by: THORACIC SURGERY (CARDIOTHORACIC VASCULAR SURGERY)

## 2022-06-15 RX ORDER — SODIUM CHLORIDE 0.9 % (FLUSH) 0.9 %
3-10 SYRINGE (ML) INJECTION AS NEEDED
Status: CANCELLED | OUTPATIENT
Start: 2022-06-24

## 2022-06-15 RX ORDER — GABAPENTIN 300 MG/1
300 CAPSULE ORAL ONCE
Status: CANCELLED | OUTPATIENT
Start: 2022-06-24 | End: 2022-06-15

## 2022-06-15 RX ORDER — HEPARIN SODIUM 5000 [USP'U]/ML
5000 INJECTION, SOLUTION INTRAVENOUS; SUBCUTANEOUS ONCE
Status: CANCELLED | OUTPATIENT
Start: 2022-06-24 | End: 2022-06-15

## 2022-06-15 RX ORDER — ACETAMINOPHEN 500 MG
1000 TABLET ORAL ONCE
Status: CANCELLED | OUTPATIENT
Start: 2022-06-24 | End: 2022-06-15

## 2022-06-15 RX ORDER — SODIUM CHLORIDE 0.9 % (FLUSH) 0.9 %
3 SYRINGE (ML) INJECTION EVERY 12 HOURS SCHEDULED
Status: CANCELLED | OUTPATIENT
Start: 2022-06-24

## 2022-06-15 RX ORDER — CEFAZOLIN SODIUM 2 G/100ML
2 INJECTION, SOLUTION INTRAVENOUS ONCE
Status: CANCELLED | OUTPATIENT
Start: 2022-06-24 | End: 2022-06-15

## 2022-06-15 RX ORDER — CELECOXIB 200 MG/1
200 CAPSULE ORAL ONCE
Status: CANCELLED | OUTPATIENT
Start: 2022-06-24 | End: 2022-06-15

## 2022-06-15 NOTE — PROGRESS NOTES
"Chief Complaint  Right lung nodule    Subjective        Darling Morgan presents to [facility] THORACIC SURGERY  History of Present Illness     Ms. Morgna was seen in the office today for further follow-up of a nodule in the right lung.  Since her initial evaluation she is undergone a CT PET scan and pulmonary function tests.  She is here today to discuss the results of the studies and to formulate a treatment plan.  She reports no new pulmonary symptoms.    Objective   Vital Signs:  /80 (BP Location: Right arm, Patient Position: Sitting, Cuff Size: Adult)   Pulse 58   Ht 170.2 cm (67\")   Wt 54.4 kg (120 lb)   SpO2 97%   BMI 18.79 kg/m²   Estimated body mass index is 18.79 kg/m² as calculated from the following:    Height as of this encounter: 170.2 cm (67\").    Weight as of this encounter: 54.4 kg (120 lb).    BMI is within normal parameters. No other follow-up for BMI required.      Physical Exam    Neck: No palpable adenopathy or masses.    Pulmonary: Lungs are clear to auscultation with equal breath sounds bilaterally    Cardiac: Regular rate and rhythm.  No murmurs or gallops.  No dependent edema.    Abdomen: Soft and nontender.  No masses or organomegaly.  Good bowel sounds in all quadrants.    Result Review :  The following data was reviewed by: Bharathi Quinones III, MD on 06/15/2022:              CT PET scan performed Beth 10, 2022 was independently reviewed.  The nodularity around the wall of the right lower lobe blab is just barely above the level of the background blood.  There is no hypermetabolic hilar or mediastinal adenopathy.  There is nodular thickening of the left adrenal gland which is most consistent with an adenoma.  No evidence for hypermetabolic activity in the neck abdomen or pelvis.    Pulmonary function tests:    FVC is 3.43 which is 103% of predicted  FEV1 is 2.20 which is 87% of predicted  FEV1 FVC ratio 64 which is 84% of predicted  DLCO is 13.3 which is 53% of " predicted       Assessment and Plan   Diagnoses and all orders for this visit:    1. Lung nodule (Primary)  -     Case request    2. Chronic obstructive pulmonary disease, unspecified COPD type (HCC)      Right lower lobe nodule adjacent to the wall of the blab is concerning for a primary bronchogenic carcinoma.  Location of the nodule makes CT directed needle biopsy high risk for pneumothorax and/or bleeding.  Nodule is too far in the periphery to obtain an adequate biopsy with bronchoscopy.  I have recommended to the patient robot-assisted wedge resection with possible lobectomy.  I have explained the procedure in detail to her and her .  We have discussed the risks and benefits.  Alternative forms of therapy and their prognosis were also discussed.  All of her questions were a answered her satisfaction.  The patient is requested that we proceed with surgery.    Case request and preoperative orders have been placed in [facility].  Patient will be scheduled for robot-assisted wedge resection with possible lobectomy at [facility] in the near future.  I will keep you informed of her progress.       I spent 30 minutes caring for Darling on this date of service. This time includes time spent by me in the following activities:preparing for the visit, reviewing tests, performing a medically appropriate examination and/or evaluation , counseling and educating the patient/family/caregiver, ordering medications, tests, or procedures, referring and communicating with other health care professionals , documenting information in the medical record, independently interpreting results and communicating that information with the patient/family/caregiver and care coordination  Follow Up   Return for Return to office following surgery.  Patient was given instructions and counseling regarding her condition or for health maintenance advice. Please see specific information pulled into the AVS if appropriate.

## 2022-06-20 ENCOUNTER — PRE-ADMISSION TESTING (OUTPATIENT)
Dept: PREADMISSION TESTING | Facility: HOSPITAL | Age: 72
End: 2022-06-20

## 2022-06-20 VITALS — BODY MASS INDEX: 19.06 KG/M2 | WEIGHT: 121.4 LBS | HEIGHT: 67 IN

## 2022-06-20 DIAGNOSIS — R91.1 LUNG NODULE: ICD-10-CM

## 2022-06-20 DIAGNOSIS — R79.1 ABNORMAL COAGULATION PROFILE: ICD-10-CM

## 2022-06-20 LAB
ABO GROUP BLD: NORMAL
ANION GAP SERPL CALCULATED.3IONS-SCNC: 8 MMOL/L (ref 5–15)
ANTI-A1: POSITIVE
BASOPHILS # BLD AUTO: 0.03 10*3/MM3 (ref 0–0.2)
BASOPHILS NFR BLD AUTO: 0.5 % (ref 0–1.5)
BLD GP AB SCN SERPL QL: NEGATIVE
BUN SERPL-MCNC: 18 MG/DL (ref 8–23)
BUN/CREAT SERPL: 19.1 (ref 7–25)
CALCIUM SPEC-SCNC: 9.3 MG/DL (ref 8.6–10.5)
CHLORIDE SERPL-SCNC: 107 MMOL/L (ref 98–107)
CO2 SERPL-SCNC: 25 MMOL/L (ref 22–29)
CREAT SERPL-MCNC: 0.94 MG/DL (ref 0.57–1)
DEPRECATED RDW RBC AUTO: 44.8 FL (ref 37–54)
EGFRCR SERPLBLD CKD-EPI 2021: 65.4 ML/MIN/1.73
EOSINOPHIL # BLD AUTO: 0.12 10*3/MM3 (ref 0–0.4)
EOSINOPHIL NFR BLD AUTO: 2.2 % (ref 0.3–6.2)
ERYTHROCYTE [DISTWIDTH] IN BLOOD BY AUTOMATED COUNT: 13.4 % (ref 12.3–15.4)
GLUCOSE SERPL-MCNC: 79 MG/DL (ref 65–99)
HCT VFR BLD AUTO: 38 % (ref 34–46.6)
HGB BLD-MCNC: 12.7 G/DL (ref 12–15.9)
IMM GRANULOCYTES # BLD AUTO: 0.02 10*3/MM3 (ref 0–0.05)
IMM GRANULOCYTES NFR BLD AUTO: 0.4 % (ref 0–0.5)
INR PPP: 1.01 (ref 0.9–1.1)
LYMPHOCYTES # BLD AUTO: 2.08 10*3/MM3 (ref 0.7–3.1)
LYMPHOCYTES NFR BLD AUTO: 38 % (ref 19.6–45.3)
MCH RBC QN AUTO: 30.5 PG (ref 26.6–33)
MCHC RBC AUTO-ENTMCNC: 33.4 G/DL (ref 31.5–35.7)
MCV RBC AUTO: 91.3 FL (ref 79–97)
MONOCYTES # BLD AUTO: 0.53 10*3/MM3 (ref 0.1–0.9)
MONOCYTES NFR BLD AUTO: 9.7 % (ref 5–12)
NEUTROPHILS NFR BLD AUTO: 2.7 10*3/MM3 (ref 1.7–7)
NEUTROPHILS NFR BLD AUTO: 49.2 % (ref 42.7–76)
NRBC BLD AUTO-RTO: 0 /100 WBC (ref 0–0.2)
PLATELET # BLD AUTO: 327 10*3/MM3 (ref 140–450)
PMV BLD AUTO: 9.1 FL (ref 6–12)
POTASSIUM SERPL-SCNC: 4.4 MMOL/L (ref 3.5–5.2)
PROTHROMBIN TIME: 13.2 SECONDS (ref 11.7–14.2)
QT INTERVAL: 402 MS
RBC # BLD AUTO: 4.16 10*6/MM3 (ref 3.77–5.28)
RH BLD: POSITIVE
SODIUM SERPL-SCNC: 140 MMOL/L (ref 136–145)
T&S EXPIRATION DATE: NORMAL
WBC NRBC COR # BLD: 5.48 10*3/MM3 (ref 3.4–10.8)

## 2022-06-20 PROCEDURE — 80048 BASIC METABOLIC PNL TOTAL CA: CPT

## 2022-06-20 PROCEDURE — 86920 COMPATIBILITY TEST SPIN: CPT

## 2022-06-20 PROCEDURE — 86901 BLOOD TYPING SEROLOGIC RH(D): CPT

## 2022-06-20 PROCEDURE — 86850 RBC ANTIBODY SCREEN: CPT

## 2022-06-20 PROCEDURE — 85025 COMPLETE CBC W/AUTO DIFF WBC: CPT

## 2022-06-20 PROCEDURE — 85610 PROTHROMBIN TIME: CPT

## 2022-06-20 PROCEDURE — 86900 BLOOD TYPING SEROLOGIC ABO: CPT

## 2022-06-20 PROCEDURE — 93005 ELECTROCARDIOGRAM TRACING: CPT

## 2022-06-20 PROCEDURE — 86905 BLOOD TYPING RBC ANTIGENS: CPT

## 2022-06-20 PROCEDURE — 93010 ELECTROCARDIOGRAM REPORT: CPT | Performed by: INTERNAL MEDICINE

## 2022-06-20 PROCEDURE — 36415 COLL VENOUS BLD VENIPUNCTURE: CPT

## 2022-06-20 PROCEDURE — 86922 COMPATIBILITY TEST ANTIGLOB: CPT

## 2022-06-20 NOTE — DISCHARGE INSTRUCTIONS
Take the following medications the morning of surgery:  OMEPRAZOLE    If you are on prescription narcotic pain medication to control your pain you may also take that medication the morning of surgery.    General Instructions:  Do not eat solid food after midnight the night before surgery.  You may drink clear liquids day of surgery but must stop at least one hour before your hospital arrival time. CLEAR LIQUIDS UNTIL 9:30 AM  It is beneficial for you to have a clear drink that contains carbohydrates the day of surgery.  We suggest a 12 to 20 ounce bottle of Gatorade or Powerade for non-diabetic patients or a 12 to 20 ounce bottle of G2 or Powerade Zero for diabetic patients. (Pediatric patients, are not advised to drink a 12 to 20 ounce carbohydrate drink)    Clear liquids are liquids you can see through.  Nothing red in color.     Plain water                               Sports drinks  Sodas                                   Gelatin (Jell-O)  Fruit juices without pulp such as white grape juice and apple juice  Popsicles that contain no fruit or yogurt  Tea or coffee (no cream or milk added)  Gatorade / Powerade  G2 / Powerade Zero    Infants may have breast milk up to four hours before surgery.  Infants drinking formula may drink formula up to six hours before surgery.   Patients who avoid smoking, chewing tobacco and alcohol for 4 weeks prior to surgery have a reduced risk of post-operative complications.  Quit smoking as many days before surgery as you can.  Do not smoke, use chewing tobacco or drink alcohol the day of surgery.   If applicable bring your C-PAP/ BI-PAP machine.  Bring any papers given to you in the doctor’s office.  Wear clean comfortable clothes.  Do not wear contact lenses, false eyelashes or make-up.  Bring a case for your glasses.   Bring crutches or walker if applicable.  Remove all piercings.  Leave jewelry and any other valuables at home.  Hair extensions with metal clips must be removed  prior to surgery.  The Pre-Admission Testing nurse will instruct you to bring medications if unable to obtain an accurate list in Pre-Admission Testing.        If you were given a blood bank ID arm band remember to bring it with you the day of surgery.    Preventing a Surgical Site Infection:  For 2 to 3 days before surgery, avoid shaving with a razor because the razor can irritate skin and make it easier to develop an infection.    Any areas of open skin can increase the risk of a post-operative wound infection by allowing bacteria to enter and travel throughout the body.  Notify your surgeon if you have any skin wounds / rashes even if it is not near the expected surgical site.  The area will need assessed to determine if surgery should be delayed until it is healed.  The night prior to surgery shower using a fresh bar of anti-bacterial soap (such as Dial) and clean washcloth.  Sleep in a clean bed with clean clothing.  Do not allow pets to sleep with you.  Shower on the morning of surgery using a fresh bar of anti-bacterial soap (such as Dial) and clean washcloth.  Dry with a clean towel and dress in clean clothing.  Ask your surgeon if you will be receiving antibiotics prior to surgery.  Make sure you, your family, and all healthcare providers clean their hands with soap and water or an alcohol based hand  before caring for you or your wound.    Day of surgery: 6/24/2022 ARRIVAL TIME 10:30 AM  Your arrival time is approximately two hours before your scheduled surgery time.  Upon arrival, a Pre-op nurse and Anesthesiologist will review your health history, obtain vital signs, and answer questions you may have.  The only belongings needed at this time will be a list of your home medications and if applicable your C-PAP/BI-PAP machine.  A Pre-op nurse will start an IV and you may receive medication in preparation for surgery, including something to help you relax.     Please be aware that surgery does come  with discomfort.  We want to make every effort to control your discomfort so please discuss any uncontrolled symptoms with your nurse.   Your doctor will most likely have prescribed pain medications.      If you are going home after surgery you will receive individualized written care instructions before being discharged.  A responsible adult must drive you to and from the hospital on the day of your surgery and stay with you for 24 hours.  Discharge prescriptions can be filled by the hospital pharmacy during regular pharmacy hours.  If you are having surgery late in the day/evening your prescription may be e-prescribed to your pharmacy.  Please verify your pharmacy hours or chose a 24 hour pharmacy to avoid not having access to your prescription because your pharmacy has closed for the day.    If you are staying overnight following surgery, you will be transported to your hospital room following the recovery period.  [facility] has all private rooms.    If you have any questions please call Pre-Admission Testing at [phone].  Deductibles and co-payments are collected on the day of service. Please be prepared to pay the required co-pay, deductible or deposit on the day of service as defined by your plan.    Patient Education for Self-Quarantine Process    Following your COVID testing, we strongly recommend that you wear a mask when you are with other people and practice social distancing.   Limit your activities to only required outings.  Wash your hands with soap and water frequently for at least 20 seconds.   Avoid touching your eyes, nose and mouth with unwashed hands.  Do not share anything - utensils, drinking glasses, food from the same bowl.   Sanitize household surfaces daily. Include all high touch areas (door handles, light switches, phones, countertops, etc.)    Call your surgeon immediately if you experience any of the following symptoms:  Sore Throat  Shortness of Breath or  difficulty breathing  Cough  Chills  Body soreness or muscle pain  Headache  Fever  New loss of taste or smell  Do not arrive for your surgery ill.  Your procedure will need to be rescheduled to another time.  You will need to call your physician before the day of surgery to avoid any unnecessary exposure to hospital staff as well as other patients.   CHLORHEXIDINE CLOTH INSTRUCTIONS  The morning of surgery follow these instructions using the Chlorhexidine cloths you've been given.  These steps reduce bacteria on the body.  Do not use the cloths near your eyes, ears mouth, genitalia or on open wounds.  Throw the cloths away after use but do not try to flush them down a toilet.      Open and remove one cloth at a time from the package.    Leave the cloth unfolded and begin the bathing.  Massage the skin with the cloths using gentle pressure to remove bacteria.  Do not scrub harshly.   Follow the steps below with one 2% CHG cloth per area (6 total cloths).  One cloth for neck, shoulders and chest.  One cloth for both arms, hands, fingers and underarms (do underarms last).  One cloth for the abdomen followed by groin.  One cloth for right leg and foot including between the toes.  One cloth for left leg and foot including between the toes.  The last cloth is to be used for the back of the neck, back and buttocks.    Allow the CHG to air dry 3 minutes on the skin which will give it time to work and decrease the chance of irritation.  The skin may feel sticky until it is dry.  Do not rinse with water or any other liquid or you will lose the beneficial effects of the CHG.  If mild skin irritation occurs, do rinse the skin to remove the CHG.  Report this to the nurse at time of admission.  Do not apply lotions, creams, ointments, deodorants or perfumes after using the clothes. Dress in clean clothes before coming to the hospital.

## 2022-06-21 ENCOUNTER — TELEPHONE (OUTPATIENT)
Dept: SURGERY | Facility: CLINIC | Age: 72
End: 2022-06-21

## 2022-06-21 DIAGNOSIS — R91.1 LUNG NODULE: Primary | ICD-10-CM

## 2022-06-21 LAB
ABO GROUP BLD: NORMAL
RH BLD: POSITIVE

## 2022-06-21 NOTE — TELEPHONE ENCOUNTER
Provider:EVELIA  Caller: PT  Relationship to Patient: SELF    Phone Number: [phone]   Reason for Call: PT CALLED NOTIFY PRE OP NURSE THAT TETANUS SHOT HAS BEEN RECEIVED-WILL BRING RECORD WHEN SHE COMES FOR PROCEDURE FRIDAY

## 2022-06-22 ENCOUNTER — LAB (OUTPATIENT)
Dept: LAB | Facility: HOSPITAL | Age: 72
End: 2022-06-22

## 2022-06-22 DIAGNOSIS — R91.1 LUNG NODULE: ICD-10-CM

## 2022-06-22 LAB — SARS-COV-2 ORF1AB RESP QL NAA+PROBE: NOT DETECTED

## 2022-06-22 PROCEDURE — C9803 HOPD COVID-19 SPEC COLLECT: HCPCS

## 2022-06-22 PROCEDURE — U0005 INFEC AGEN DETEC AMPLI PROBE: HCPCS

## 2022-06-22 PROCEDURE — U0004 COV-19 TEST NON-CDC HGH THRU: HCPCS

## 2022-06-24 ENCOUNTER — HOSPITAL ENCOUNTER (INPATIENT)
Facility: HOSPITAL | Age: 72
LOS: 8 days | Discharge: HOME | End: 2022-07-04
Attending: THORACIC SURGERY (CARDIOTHORACIC VASCULAR SURGERY) | Admitting: HOSPITALIST

## 2022-06-24 ENCOUNTER — APPOINTMENT (OUTPATIENT)
Dept: CT IMAGING | Facility: HOSPITAL | Age: 72
End: 2022-06-24

## 2022-06-24 DIAGNOSIS — Z09 FOLLOW-UP EXAM: ICD-10-CM

## 2022-06-24 DIAGNOSIS — R91.1 LUNG NODULE: Primary | ICD-10-CM

## 2022-06-24 PROBLEM — I48.0 PAROXYSMAL ATRIAL FIBRILLATION WITH RAPID VENTRICULAR RESPONSE: Status: ACTIVE | Noted: 2022-06-24

## 2022-06-24 PROBLEM — R29.90 STROKE-LIKE SYMPTOMS: Status: ACTIVE | Noted: 2022-06-24

## 2022-06-24 LAB — QT INTERVAL: 320 MS

## 2022-06-24 PROCEDURE — G0463 HOSPITAL OUTPT CLINIC VISIT: HCPCS | Performed by: THORACIC SURGERY (CARDIOTHORACIC VASCULAR SURGERY)

## 2022-06-24 PROCEDURE — 93010 ELECTROCARDIOGRAM REPORT: CPT | Performed by: INTERNAL MEDICINE

## 2022-06-24 PROCEDURE — 99222 1ST HOSP IP/OBS MODERATE 55: CPT | Performed by: PSYCHIATRY & NEUROLOGY

## 2022-06-24 PROCEDURE — G0378 HOSPITAL OBSERVATION PER HR: HCPCS

## 2022-06-24 PROCEDURE — 70496 CT ANGIOGRAPHY HEAD: CPT

## 2022-06-24 PROCEDURE — 99214 OFFICE O/P EST MOD 30 MIN: CPT | Performed by: INTERNAL MEDICINE

## 2022-06-24 PROCEDURE — 70498 CT ANGIOGRAPHY NECK: CPT

## 2022-06-24 PROCEDURE — 92610 EVALUATE SWALLOWING FUNCTION: CPT | Performed by: SPEECH-LANGUAGE PATHOLOGIST

## 2022-06-24 PROCEDURE — 0 IOPAMIDOL PER 1 ML: Performed by: INTERNAL MEDICINE

## 2022-06-24 PROCEDURE — 25010000002 DIGOXIN PER 500 MCG: Performed by: INTERNAL MEDICINE

## 2022-06-24 PROCEDURE — 93005 ELECTROCARDIOGRAM TRACING: CPT | Performed by: THORACIC SURGERY (CARDIOTHORACIC VASCULAR SURGERY)

## 2022-06-24 RX ORDER — NITROGLYCERIN 0.4 MG/1
0.4 TABLET SUBLINGUAL
Status: DISCONTINUED | OUTPATIENT
Start: 2022-06-24 | End: 2022-07-04 | Stop reason: HOSPADM

## 2022-06-24 RX ORDER — ZOLPIDEM TARTRATE 5 MG/1
5 TABLET ORAL NIGHTLY PRN
Status: DISPENSED | OUTPATIENT
Start: 2022-06-24 | End: 2022-07-01

## 2022-06-24 RX ORDER — ONDANSETRON 2 MG/ML
4 INJECTION INTRAMUSCULAR; INTRAVENOUS EVERY 6 HOURS PRN
Status: DISCONTINUED | OUTPATIENT
Start: 2022-06-24 | End: 2022-07-04 | Stop reason: HOSPADM

## 2022-06-24 RX ORDER — ACETAMINOPHEN 325 MG/1
650 TABLET ORAL EVERY 4 HOURS PRN
Status: DISCONTINUED | OUTPATIENT
Start: 2022-06-24 | End: 2022-07-04 | Stop reason: HOSPADM

## 2022-06-24 RX ORDER — DIGOXIN 0.25 MG/ML
250 INJECTION INTRAMUSCULAR; INTRAVENOUS ONCE
Status: COMPLETED | OUTPATIENT
Start: 2022-06-24 | End: 2022-06-24

## 2022-06-24 RX ORDER — ATORVASTATIN CALCIUM 20 MG/1
40 TABLET, FILM COATED ORAL NIGHTLY
Status: DISCONTINUED | OUTPATIENT
Start: 2022-06-24 | End: 2022-07-04 | Stop reason: HOSPADM

## 2022-06-24 RX ORDER — SODIUM CHLORIDE 0.9 % (FLUSH) 0.9 %
10 SYRINGE (ML) INJECTION EVERY 12 HOURS SCHEDULED
Status: DISCONTINUED | OUTPATIENT
Start: 2022-06-24 | End: 2022-07-04 | Stop reason: HOSPADM

## 2022-06-24 RX ORDER — SODIUM CHLORIDE 0.9 % (FLUSH) 0.9 %
10 SYRINGE (ML) INJECTION AS NEEDED
Status: DISCONTINUED | OUTPATIENT
Start: 2022-06-24 | End: 2022-07-04 | Stop reason: HOSPADM

## 2022-06-24 RX ORDER — ACETAMINOPHEN 160 MG/5ML
650 SOLUTION ORAL EVERY 4 HOURS PRN
Status: DISCONTINUED | OUTPATIENT
Start: 2022-06-24 | End: 2022-07-04 | Stop reason: HOSPADM

## 2022-06-24 RX ORDER — ASPIRIN 81 MG/1
81 TABLET, CHEWABLE ORAL DAILY
Status: DISCONTINUED | OUTPATIENT
Start: 2022-06-24 | End: 2022-07-04

## 2022-06-24 RX ORDER — PANTOPRAZOLE SODIUM 40 MG/1
40 TABLET, DELAYED RELEASE ORAL
Status: DISCONTINUED | OUTPATIENT
Start: 2022-06-25 | End: 2022-07-04 | Stop reason: HOSPADM

## 2022-06-24 RX ORDER — ACETAMINOPHEN 650 MG/1
650 SUPPOSITORY RECTAL EVERY 4 HOURS PRN
Status: DISCONTINUED | OUTPATIENT
Start: 2022-06-24 | End: 2022-07-04 | Stop reason: HOSPADM

## 2022-06-24 RX ADMIN — METOPROLOL TARTRATE 25 MG: 25 TABLET, FILM COATED ORAL at 14:16

## 2022-06-24 RX ADMIN — ASPIRIN 81 MG: 81 TABLET, CHEWABLE ORAL at 21:54

## 2022-06-24 RX ADMIN — METOPROLOL TARTRATE 25 MG: 25 TABLET, FILM COATED ORAL at 21:55

## 2022-06-24 RX ADMIN — ATORVASTATIN CALCIUM 40 MG: 20 TABLET, FILM COATED ORAL at 21:54

## 2022-06-24 RX ADMIN — ZOLPIDEM TARTRATE 5 MG: 5 TABLET ORAL at 21:55

## 2022-06-24 RX ADMIN — DIGOXIN 250 MCG: 0.25 INJECTION INTRAMUSCULAR; INTRAVENOUS at 14:22

## 2022-06-24 RX ADMIN — IOPAMIDOL 95 ML: 755 INJECTION, SOLUTION INTRAVENOUS at 20:40

## 2022-06-24 RX ADMIN — Medication 10 ML: at 21:56

## 2022-06-25 ENCOUNTER — APPOINTMENT (OUTPATIENT)
Dept: MRI IMAGING | Facility: HOSPITAL | Age: 72
End: 2022-06-25

## 2022-06-25 ENCOUNTER — APPOINTMENT (OUTPATIENT)
Dept: OTHER | Facility: HOSPITAL | Age: 72
End: 2022-06-25

## 2022-06-25 ENCOUNTER — APPOINTMENT (OUTPATIENT)
Dept: CARDIOLOGY | Facility: HOSPITAL | Age: 72
End: 2022-06-25

## 2022-06-25 LAB
ALBUMIN SERPL-MCNC: 3.6 G/DL (ref 3.5–5.2)
ALBUMIN/GLOB SERPL: 1.1 G/DL
ALP SERPL-CCNC: 76 U/L (ref 39–117)
ALT SERPL W P-5'-P-CCNC: 17 U/L (ref 1–33)
ANION GAP SERPL CALCULATED.3IONS-SCNC: 9.2 MMOL/L (ref 5–15)
AORTIC DIMENSIONLESS INDEX: 0.7 (DI)
APTT PPP: 26.8 SECONDS (ref 22.7–35.4)
AST SERPL-CCNC: 18 U/L (ref 1–32)
BASOPHILS # BLD AUTO: 0.02 10*3/MM3 (ref 0–0.2)
BASOPHILS # BLD AUTO: 0.03 10*3/MM3 (ref 0–0.2)
BASOPHILS NFR BLD AUTO: 0.3 % (ref 0–1.5)
BASOPHILS NFR BLD AUTO: 0.6 % (ref 0–1.5)
BH CV ECHO MEAS - ACS: 1.95 CM
BH CV ECHO MEAS - AO MAX PG: 4.8 MMHG
BH CV ECHO MEAS - AO MEAN PG: 2.37 MMHG
BH CV ECHO MEAS - AO ROOT DIAM: 3.3 CM
BH CV ECHO MEAS - AO V2 MAX: 110.1 CM/SEC
BH CV ECHO MEAS - AO V2 VTI: 18.8 CM
BH CV ECHO MEAS - AVA(I,D): 1.93 CM2
BH CV ECHO MEAS - EDV(CUBED): 147.3 ML
BH CV ECHO MEAS - EDV(MOD-SP2): 78 ML
BH CV ECHO MEAS - EDV(MOD-SP4): 59 ML
BH CV ECHO MEAS - EF(MOD-BP): 62 %
BH CV ECHO MEAS - EF(MOD-SP2): 66.7 %
BH CV ECHO MEAS - EF(MOD-SP4): 59.3 %
BH CV ECHO MEAS - ESV(CUBED): 50.4 ML
BH CV ECHO MEAS - ESV(MOD-SP2): 26 ML
BH CV ECHO MEAS - ESV(MOD-SP4): 24 ML
BH CV ECHO MEAS - FS: 30 %
BH CV ECHO MEAS - IVS/LVPW: 1.04 CM
BH CV ECHO MEAS - IVSD: 1 CM
BH CV ECHO MEAS - LAT PEAK E' VEL: 5.4 CM/SEC
BH CV ECHO MEAS - LV MASS(C)D: 194.6 GRAMS
BH CV ECHO MEAS - LV MAX PG: 2.02 MMHG
BH CV ECHO MEAS - LV MEAN PG: 0.93 MMHG
BH CV ECHO MEAS - LV V1 MAX: 71.1 CM/SEC
BH CV ECHO MEAS - LV V1 VTI: 12.5 CM
BH CV ECHO MEAS - LVIDD: 5.3 CM
BH CV ECHO MEAS - LVIDS: 3.7 CM
BH CV ECHO MEAS - LVOT AREA: 2.9 CM2
BH CV ECHO MEAS - LVOT DIAM: 1.92 CM
BH CV ECHO MEAS - LVPWD: 0.96 CM
BH CV ECHO MEAS - MED PEAK E' VEL: 4.9 CM/SEC
BH CV ECHO MEAS - MR MAX PG: 128.1 MMHG
BH CV ECHO MEAS - MR MAX VEL: 565.8 CM/SEC
BH CV ECHO MEAS - MV A DUR: 0.11 SEC
BH CV ECHO MEAS - MV A MAX VEL: 40.3 CM/SEC
BH CV ECHO MEAS - MV DEC SLOPE: 530.7 CM/SEC2
BH CV ECHO MEAS - MV DEC TIME: 230 MSEC
BH CV ECHO MEAS - MV E MAX VEL: 62.6 CM/SEC
BH CV ECHO MEAS - MV E/A: 1.55
BH CV ECHO MEAS - MV MAX PG: 3.2 MMHG
BH CV ECHO MEAS - MV MEAN PG: 0.85 MMHG
BH CV ECHO MEAS - MV P1/2T: 52.6 MSEC
BH CV ECHO MEAS - MV V2 VTI: 24.1 CM
BH CV ECHO MEAS - MVA(P1/2T): 4.2 CM2
BH CV ECHO MEAS - MVA(VTI): 1.5 CM2
BH CV ECHO MEAS - PA ACC TIME: 0.16 SEC
BH CV ECHO MEAS - PA PR(ACCEL): 7.7 MMHG
BH CV ECHO MEAS - PA V2 MAX: 70.6 CM/SEC
BH CV ECHO MEAS - PULM A REVS DUR: 0.11 SEC
BH CV ECHO MEAS - PULM A REVS VEL: 23.1 CM/SEC
BH CV ECHO MEAS - PULM DIAS VEL: 63.4 CM/SEC
BH CV ECHO MEAS - PULM S/D: 0.51
BH CV ECHO MEAS - PULM SYS VEL: 32.6 CM/SEC
BH CV ECHO MEAS - QP/QS: 1.15
BH CV ECHO MEAS - RAP SYSTOLE: 8 MMHG
BH CV ECHO MEAS - RV MAX PG: 1.65 MMHG
BH CV ECHO MEAS - RV V1 MAX: 64.3 CM/SEC
BH CV ECHO MEAS - RV V1 VTI: 14.6 CM
BH CV ECHO MEAS - RVOT DIAM: 1.9 CM
BH CV ECHO MEAS - RVSP: 41 MMHG
BH CV ECHO MEAS - SV(LVOT): 36.2 ML
BH CV ECHO MEAS - SV(MOD-SP2): 52 ML
BH CV ECHO MEAS - SV(MOD-SP4): 35 ML
BH CV ECHO MEAS - SV(RVOT): 41.5 ML
BH CV ECHO MEAS - TR MAX PG: 33.1 MMHG
BH CV ECHO MEAS - TR MAX VEL: 287.7 CM/SEC
BH CV ECHO MEASUREMENTS AVERAGE E/E' RATIO: 12.16
BH CV XLRA - RV BASE: 3.2 CM
BH CV XLRA - RV LENGTH: 5 CM
BH CV XLRA - RV MID: 2.7 CM
BH CV XLRA - TDI S': 8.6 CM/SEC
BILIRUB SERPL-MCNC: 0.2 MG/DL (ref 0–1.2)
BUN SERPL-MCNC: 10 MG/DL (ref 8–23)
BUN/CREAT SERPL: 11.8 (ref 7–25)
CALCIUM SPEC-SCNC: 8.9 MG/DL (ref 8.6–10.5)
CHLORIDE SERPL-SCNC: 108 MMOL/L (ref 98–107)
CO2 SERPL-SCNC: 24.8 MMOL/L (ref 22–29)
CREAT SERPL-MCNC: 0.85 MG/DL (ref 0.57–1)
DEPRECATED RDW RBC AUTO: 42.7 FL (ref 37–54)
DEPRECATED RDW RBC AUTO: 44.7 FL (ref 37–54)
EGFRCR SERPLBLD CKD-EPI 2021: 73.8 ML/MIN/1.73
EOSINOPHIL # BLD AUTO: 0.15 10*3/MM3 (ref 0–0.4)
EOSINOPHIL # BLD AUTO: 0.17 10*3/MM3 (ref 0–0.4)
EOSINOPHIL NFR BLD AUTO: 2.5 % (ref 0.3–6.2)
EOSINOPHIL NFR BLD AUTO: 3.2 % (ref 0.3–6.2)
ERYTHROCYTE [DISTWIDTH] IN BLOOD BY AUTOMATED COUNT: 13.2 % (ref 12.3–15.4)
ERYTHROCYTE [DISTWIDTH] IN BLOOD BY AUTOMATED COUNT: 13.3 % (ref 12.3–15.4)
GLOBULIN UR ELPH-MCNC: 3.3 GM/DL
GLUCOSE SERPL-MCNC: 110 MG/DL (ref 65–99)
HCT VFR BLD AUTO: 36 % (ref 34–46.6)
HCT VFR BLD AUTO: 37.9 % (ref 34–46.6)
HGB BLD-MCNC: 12.4 G/DL (ref 12–15.9)
HGB BLD-MCNC: 12.5 G/DL (ref 12–15.9)
IMM GRANULOCYTES # BLD AUTO: 0.01 10*3/MM3 (ref 0–0.05)
IMM GRANULOCYTES # BLD AUTO: 0.02 10*3/MM3 (ref 0–0.05)
IMM GRANULOCYTES NFR BLD AUTO: 0.2 % (ref 0–0.5)
IMM GRANULOCYTES NFR BLD AUTO: 0.3 % (ref 0–0.5)
INR PPP: 1.04 (ref 0.9–1.1)
LYMPHOCYTES # BLD AUTO: 2.11 10*3/MM3 (ref 0.7–3.1)
LYMPHOCYTES # BLD AUTO: 2.32 10*3/MM3 (ref 0.7–3.1)
LYMPHOCYTES NFR BLD AUTO: 35.7 % (ref 19.6–45.3)
LYMPHOCYTES NFR BLD AUTO: 43.9 % (ref 19.6–45.3)
MAXIMAL PREDICTED HEART RATE: 150 BPM
MCH RBC QN AUTO: 30.1 PG (ref 26.6–33)
MCH RBC QN AUTO: 30.8 PG (ref 26.6–33)
MCHC RBC AUTO-ENTMCNC: 33 G/DL (ref 31.5–35.7)
MCHC RBC AUTO-ENTMCNC: 34.4 G/DL (ref 31.5–35.7)
MCV RBC AUTO: 89.3 FL (ref 79–97)
MCV RBC AUTO: 91.3 FL (ref 79–97)
MONOCYTES # BLD AUTO: 0.52 10*3/MM3 (ref 0.1–0.9)
MONOCYTES # BLD AUTO: 0.58 10*3/MM3 (ref 0.1–0.9)
MONOCYTES NFR BLD AUTO: 9.8 % (ref 5–12)
MONOCYTES NFR BLD AUTO: 9.8 % (ref 5–12)
MR PISA EROA: 0.15 CM2
NEUTROPHILS NFR BLD AUTO: 2.23 10*3/MM3 (ref 1.7–7)
NEUTROPHILS NFR BLD AUTO: 3.03 10*3/MM3 (ref 1.7–7)
NEUTROPHILS NFR BLD AUTO: 42.3 % (ref 42.7–76)
NEUTROPHILS NFR BLD AUTO: 51.4 % (ref 42.7–76)
NRBC BLD AUTO-RTO: 0 /100 WBC (ref 0–0.2)
NRBC BLD AUTO-RTO: 0 /100 WBC (ref 0–0.2)
PISA ALIASING VEL: 38.5 M/S
PISA RADIUS: 0.6 CM
PLATELET # BLD AUTO: 292 10*3/MM3 (ref 140–450)
PLATELET # BLD AUTO: 315 10*3/MM3 (ref 140–450)
PMV BLD AUTO: 9.2 FL (ref 6–12)
PMV BLD AUTO: 9.4 FL (ref 6–12)
POTASSIUM SERPL-SCNC: 3.9 MMOL/L (ref 3.5–5.2)
PROT SERPL-MCNC: 6.9 G/DL (ref 6–8.5)
PROTHROMBIN TIME: 13.5 SECONDS (ref 11.7–14.2)
RBC # BLD AUTO: 4.03 10*6/MM3 (ref 3.77–5.28)
RBC # BLD AUTO: 4.15 10*6/MM3 (ref 3.77–5.28)
SODIUM SERPL-SCNC: 142 MMOL/L (ref 136–145)
STRESS TARGET HR: 128 BPM
TROPONIN T SERPL-MCNC: <0.01 NG/ML (ref 0–0.03)
TSH SERPL DL<=0.05 MIU/L-ACNC: 3.83 UIU/ML (ref 0.27–4.2)
WBC NRBC COR # BLD: 5.28 10*3/MM3 (ref 3.4–10.8)
WBC NRBC COR # BLD: 5.91 10*3/MM3 (ref 3.4–10.8)

## 2022-06-25 PROCEDURE — 99232 SBSQ HOSP IP/OBS MODERATE 35: CPT | Performed by: THORACIC SURGERY (CARDIOTHORACIC VASCULAR SURGERY)

## 2022-06-25 PROCEDURE — 85730 THROMBOPLASTIN TIME PARTIAL: CPT | Performed by: INTERNAL MEDICINE

## 2022-06-25 PROCEDURE — 70553 MRI BRAIN STEM W/O & W/DYE: CPT

## 2022-06-25 PROCEDURE — 25010000002 HEPARIN (PORCINE) 25000-0.45 UT/250ML-% SOLUTION: Performed by: INTERNAL MEDICINE

## 2022-06-25 PROCEDURE — G0378 HOSPITAL OBSERVATION PER HR: HCPCS

## 2022-06-25 PROCEDURE — 93306 TTE W/DOPPLER COMPLETE: CPT

## 2022-06-25 PROCEDURE — 85025 COMPLETE CBC W/AUTO DIFF WBC: CPT | Performed by: INTERNAL MEDICINE

## 2022-06-25 PROCEDURE — 99232 SBSQ HOSP IP/OBS MODERATE 35: CPT | Performed by: PSYCHIATRY & NEUROLOGY

## 2022-06-25 PROCEDURE — 25010000002 LORAZEPAM PER 2 MG: Performed by: INTERNAL MEDICINE

## 2022-06-25 PROCEDURE — A9577 INJ MULTIHANCE: HCPCS | Performed by: INTERNAL MEDICINE

## 2022-06-25 PROCEDURE — 80053 COMPREHEN METABOLIC PANEL: CPT | Performed by: INTERNAL MEDICINE

## 2022-06-25 PROCEDURE — 84443 ASSAY THYROID STIM HORMONE: CPT | Performed by: INTERNAL MEDICINE

## 2022-06-25 PROCEDURE — 0 GADOBENATE DIMEGLUMINE 529 MG/ML SOLUTION: Performed by: INTERNAL MEDICINE

## 2022-06-25 PROCEDURE — 85610 PROTHROMBIN TIME: CPT | Performed by: INTERNAL MEDICINE

## 2022-06-25 PROCEDURE — 25010000002 HYDRALAZINE PER 20 MG: Performed by: INTERNAL MEDICINE

## 2022-06-25 PROCEDURE — 93306 TTE W/DOPPLER COMPLETE: CPT | Performed by: INTERNAL MEDICINE

## 2022-06-25 PROCEDURE — 84484 ASSAY OF TROPONIN QUANT: CPT | Performed by: INTERNAL MEDICINE

## 2022-06-25 RX ORDER — HEPARIN SODIUM 5000 [USP'U]/ML
40-80 INJECTION, SOLUTION INTRAVENOUS; SUBCUTANEOUS EVERY 6 HOURS PRN
Status: DISCONTINUED | OUTPATIENT
Start: 2022-06-25 | End: 2022-07-04 | Stop reason: HOSPADM

## 2022-06-25 RX ORDER — HYDRALAZINE HYDROCHLORIDE 20 MG/ML
10 INJECTION INTRAMUSCULAR; INTRAVENOUS EVERY 4 HOURS PRN
Status: DISCONTINUED | OUTPATIENT
Start: 2022-06-25 | End: 2022-07-04 | Stop reason: HOSPADM

## 2022-06-25 RX ORDER — HEPARIN SODIUM 10000 [USP'U]/100ML
18 INJECTION, SOLUTION INTRAVENOUS
Status: DISPENSED | OUTPATIENT
Start: 2022-06-25 | End: 2022-06-29

## 2022-06-25 RX ORDER — LORAZEPAM 2 MG/ML
0.5 INJECTION INTRAMUSCULAR EVERY 6 HOURS PRN
Status: DISCONTINUED | OUTPATIENT
Start: 2022-06-25 | End: 2022-06-27

## 2022-06-25 RX ORDER — AMLODIPINE BESYLATE 5 MG/1
5 TABLET ORAL
Status: DISCONTINUED | OUTPATIENT
Start: 2022-06-25 | End: 2022-07-02

## 2022-06-25 RX ADMIN — PANTOPRAZOLE SODIUM 40 MG: 40 TABLET, DELAYED RELEASE ORAL at 05:26

## 2022-06-25 RX ADMIN — HYDRALAZINE HYDROCHLORIDE 10 MG: 20 INJECTION INTRAMUSCULAR; INTRAVENOUS at 14:47

## 2022-06-25 RX ADMIN — ASPIRIN 81 MG: 81 TABLET, CHEWABLE ORAL at 08:00

## 2022-06-25 RX ADMIN — Medication 10 ML: at 20:47

## 2022-06-25 RX ADMIN — ATORVASTATIN CALCIUM 40 MG: 20 TABLET, FILM COATED ORAL at 20:47

## 2022-06-25 RX ADMIN — METOPROLOL TARTRATE 25 MG: 25 TABLET, FILM COATED ORAL at 08:00

## 2022-06-25 RX ADMIN — METOPROLOL TARTRATE 25 MG: 25 TABLET, FILM COATED ORAL at 20:47

## 2022-06-25 RX ADMIN — LORAZEPAM 0.5 MG: 2 INJECTION INTRAMUSCULAR; INTRAVENOUS at 15:56

## 2022-06-25 RX ADMIN — AMLODIPINE BESYLATE 5 MG: 5 TABLET ORAL at 15:56

## 2022-06-25 RX ADMIN — ZOLPIDEM TARTRATE 5 MG: 5 TABLET ORAL at 20:53

## 2022-06-25 RX ADMIN — Medication 10 ML: at 08:00

## 2022-06-25 RX ADMIN — LORAZEPAM 0.5 MG: 2 INJECTION INTRAMUSCULAR; INTRAVENOUS at 22:22

## 2022-06-25 RX ADMIN — GADOBENATE DIMEGLUMINE 11 ML: 529 INJECTION, SOLUTION INTRAVENOUS at 13:34

## 2022-06-25 RX ADMIN — HEPARIN SODIUM 18 UNITS/KG/HR: 10000 INJECTION, SOLUTION INTRAVENOUS at 18:16

## 2022-06-26 PROBLEM — Z09 FOLLOW-UP EXAM: Status: ACTIVE | Noted: 2022-06-26

## 2022-06-26 LAB
APTT PPP: 147.1 SECONDS (ref 22.7–35.4)
APTT PPP: 50.8 SECONDS (ref 22.7–35.4)
APTT PPP: 79.3 SECONDS (ref 22.7–35.4)
BASOPHILS # BLD AUTO: 0.03 10*3/MM3 (ref 0–0.2)
BASOPHILS # BLD AUTO: 0.04 10*3/MM3 (ref 0–0.2)
BASOPHILS NFR BLD AUTO: 0.5 % (ref 0–1.5)
BASOPHILS NFR BLD AUTO: 0.6 % (ref 0–1.5)
DEPRECATED RDW RBC AUTO: 43.1 FL (ref 37–54)
DEPRECATED RDW RBC AUTO: 45 FL (ref 37–54)
EOSINOPHIL # BLD AUTO: 0.19 10*3/MM3 (ref 0–0.4)
EOSINOPHIL # BLD AUTO: 0.22 10*3/MM3 (ref 0–0.4)
EOSINOPHIL NFR BLD AUTO: 3 % (ref 0.3–6.2)
EOSINOPHIL NFR BLD AUTO: 3.7 % (ref 0.3–6.2)
ERYTHROCYTE [DISTWIDTH] IN BLOOD BY AUTOMATED COUNT: 13.2 % (ref 12.3–15.4)
ERYTHROCYTE [DISTWIDTH] IN BLOOD BY AUTOMATED COUNT: 13.6 % (ref 12.3–15.4)
HCT VFR BLD AUTO: 35.4 % (ref 34–46.6)
HCT VFR BLD AUTO: 38.4 % (ref 34–46.6)
HGB BLD-MCNC: 12.3 G/DL (ref 12–15.9)
HGB BLD-MCNC: 12.8 G/DL (ref 12–15.9)
IMM GRANULOCYTES # BLD AUTO: 0.01 10*3/MM3 (ref 0–0.05)
IMM GRANULOCYTES # BLD AUTO: 0.01 10*3/MM3 (ref 0–0.05)
IMM GRANULOCYTES NFR BLD AUTO: 0.2 % (ref 0–0.5)
IMM GRANULOCYTES NFR BLD AUTO: 0.2 % (ref 0–0.5)
LYMPHOCYTES # BLD AUTO: 2.75 10*3/MM3 (ref 0.7–3.1)
LYMPHOCYTES # BLD AUTO: 2.85 10*3/MM3 (ref 0.7–3.1)
LYMPHOCYTES NFR BLD AUTO: 43.1 % (ref 19.6–45.3)
LYMPHOCYTES NFR BLD AUTO: 48.5 % (ref 19.6–45.3)
MCH RBC QN AUTO: 30.3 PG (ref 26.6–33)
MCH RBC QN AUTO: 31.3 PG (ref 26.6–33)
MCHC RBC AUTO-ENTMCNC: 33.3 G/DL (ref 31.5–35.7)
MCHC RBC AUTO-ENTMCNC: 34.7 G/DL (ref 31.5–35.7)
MCV RBC AUTO: 90.1 FL (ref 79–97)
MCV RBC AUTO: 91 FL (ref 79–97)
MONOCYTES # BLD AUTO: 0.59 10*3/MM3 (ref 0.1–0.9)
MONOCYTES # BLD AUTO: 0.6 10*3/MM3 (ref 0.1–0.9)
MONOCYTES NFR BLD AUTO: 10.2 % (ref 5–12)
MONOCYTES NFR BLD AUTO: 9.2 % (ref 5–12)
NEUTROPHILS NFR BLD AUTO: 2.17 10*3/MM3 (ref 1.7–7)
NEUTROPHILS NFR BLD AUTO: 2.8 10*3/MM3 (ref 1.7–7)
NEUTROPHILS NFR BLD AUTO: 36.9 % (ref 42.7–76)
NEUTROPHILS NFR BLD AUTO: 43.9 % (ref 42.7–76)
NRBC BLD AUTO-RTO: 0 /100 WBC (ref 0–0.2)
NRBC BLD AUTO-RTO: 0 /100 WBC (ref 0–0.2)
PLATELET # BLD AUTO: 275 10*3/MM3 (ref 140–450)
PLATELET # BLD AUTO: 306 10*3/MM3 (ref 140–450)
PMV BLD AUTO: 8.9 FL (ref 6–12)
PMV BLD AUTO: 9.3 FL (ref 6–12)
RBC # BLD AUTO: 3.93 10*6/MM3 (ref 3.77–5.28)
RBC # BLD AUTO: 4.22 10*6/MM3 (ref 3.77–5.28)
WBC NRBC COR # BLD: 5.88 10*3/MM3 (ref 3.4–10.8)
WBC NRBC COR # BLD: 6.38 10*3/MM3 (ref 3.4–10.8)

## 2022-06-26 PROCEDURE — 25010000002 HEPARIN (PORCINE) 25000-0.45 UT/250ML-% SOLUTION: Performed by: INTERNAL MEDICINE

## 2022-06-26 PROCEDURE — 25010000002 HEPARIN (PORCINE) PER 1000 UNITS: Performed by: INTERNAL MEDICINE

## 2022-06-26 PROCEDURE — 85730 THROMBOPLASTIN TIME PARTIAL: CPT | Performed by: HOSPITALIST

## 2022-06-26 PROCEDURE — 85730 THROMBOPLASTIN TIME PARTIAL: CPT | Performed by: INTERNAL MEDICINE

## 2022-06-26 PROCEDURE — 25010000002 LORAZEPAM PER 2 MG: Performed by: INTERNAL MEDICINE

## 2022-06-26 PROCEDURE — 99232 SBSQ HOSP IP/OBS MODERATE 35: CPT | Performed by: THORACIC SURGERY (CARDIOTHORACIC VASCULAR SURGERY)

## 2022-06-26 PROCEDURE — 85025 COMPLETE CBC W/AUTO DIFF WBC: CPT | Performed by: INTERNAL MEDICINE

## 2022-06-26 PROCEDURE — 99232 SBSQ HOSP IP/OBS MODERATE 35: CPT | Performed by: INTERNAL MEDICINE

## 2022-06-26 RX ADMIN — Medication 10 ML: at 08:04

## 2022-06-26 RX ADMIN — HEPARIN SODIUM 19 UNITS/KG/HR: 10000 INJECTION, SOLUTION INTRAVENOUS at 18:29

## 2022-06-26 RX ADMIN — ATORVASTATIN CALCIUM 40 MG: 20 TABLET, FILM COATED ORAL at 21:23

## 2022-06-26 RX ADMIN — LORAZEPAM 0.5 MG: 2 INJECTION INTRAMUSCULAR; INTRAVENOUS at 21:24

## 2022-06-26 RX ADMIN — METOPROLOL TARTRATE 25 MG: 25 TABLET, FILM COATED ORAL at 08:04

## 2022-06-26 RX ADMIN — LORAZEPAM 0.5 MG: 2 INJECTION INTRAMUSCULAR; INTRAVENOUS at 08:10

## 2022-06-26 RX ADMIN — HEPARIN SODIUM 22 UNITS/KG/HR: 10000 INJECTION, SOLUTION INTRAVENOUS at 00:59

## 2022-06-26 RX ADMIN — Medication 10 ML: at 21:24

## 2022-06-26 RX ADMIN — HEPARIN SODIUM 4400 UNITS: 5000 INJECTION INTRAVENOUS; SUBCUTANEOUS at 00:59

## 2022-06-26 RX ADMIN — AMLODIPINE BESYLATE 5 MG: 5 TABLET ORAL at 08:04

## 2022-06-26 RX ADMIN — ASPIRIN 81 MG: 81 TABLET, CHEWABLE ORAL at 08:04

## 2022-06-26 RX ADMIN — PANTOPRAZOLE SODIUM 40 MG: 40 TABLET, DELAYED RELEASE ORAL at 05:58

## 2022-06-26 RX ADMIN — METOPROLOL TARTRATE 25 MG: 25 TABLET, FILM COATED ORAL at 21:24

## 2022-06-27 LAB
APTT PPP: 103 SECONDS (ref 22.7–35.4)
APTT PPP: 138.6 SECONDS (ref 22.7–35.4)
APTT PPP: 69.1 SECONDS (ref 22.7–35.4)
BASOPHILS # BLD AUTO: 0.02 10*3/MM3 (ref 0–0.2)
BASOPHILS NFR BLD AUTO: 0.4 % (ref 0–1.5)
DEPRECATED RDW RBC AUTO: 45.4 FL (ref 37–54)
EOSINOPHIL # BLD AUTO: 0.24 10*3/MM3 (ref 0–0.4)
EOSINOPHIL NFR BLD AUTO: 4.3 % (ref 0.3–6.2)
ERYTHROCYTE [DISTWIDTH] IN BLOOD BY AUTOMATED COUNT: 13.5 % (ref 12.3–15.4)
HCT VFR BLD AUTO: 37.8 % (ref 34–46.6)
HGB BLD-MCNC: 12.4 G/DL (ref 12–15.9)
IMM GRANULOCYTES # BLD AUTO: 0.02 10*3/MM3 (ref 0–0.05)
IMM GRANULOCYTES NFR BLD AUTO: 0.4 % (ref 0–0.5)
LYMPHOCYTES # BLD AUTO: 2.46 10*3/MM3 (ref 0.7–3.1)
LYMPHOCYTES NFR BLD AUTO: 44.4 % (ref 19.6–45.3)
MCH RBC QN AUTO: 30.2 PG (ref 26.6–33)
MCHC RBC AUTO-ENTMCNC: 32.8 G/DL (ref 31.5–35.7)
MCV RBC AUTO: 92 FL (ref 79–97)
MONOCYTES # BLD AUTO: 0.47 10*3/MM3 (ref 0.1–0.9)
MONOCYTES NFR BLD AUTO: 8.5 % (ref 5–12)
NEUTROPHILS NFR BLD AUTO: 2.33 10*3/MM3 (ref 1.7–7)
NEUTROPHILS NFR BLD AUTO: 42 % (ref 42.7–76)
NRBC BLD AUTO-RTO: 0 /100 WBC (ref 0–0.2)
PLATELET # BLD AUTO: 297 10*3/MM3 (ref 140–450)
PMV BLD AUTO: 9.2 FL (ref 6–12)
RBC # BLD AUTO: 4.11 10*6/MM3 (ref 3.77–5.28)
WBC NRBC COR # BLD: 5.54 10*3/MM3 (ref 3.4–10.8)

## 2022-06-27 PROCEDURE — 99232 SBSQ HOSP IP/OBS MODERATE 35: CPT | Performed by: NURSE PRACTITIONER

## 2022-06-27 PROCEDURE — 85730 THROMBOPLASTIN TIME PARTIAL: CPT | Performed by: INTERNAL MEDICINE

## 2022-06-27 PROCEDURE — 85025 COMPLETE CBC W/AUTO DIFF WBC: CPT | Performed by: INTERNAL MEDICINE

## 2022-06-27 PROCEDURE — 25010000002 HEPARIN (PORCINE) PER 1000 UNITS: Performed by: INTERNAL MEDICINE

## 2022-06-27 PROCEDURE — U0005 INFEC AGEN DETEC AMPLI PROBE: HCPCS | Performed by: NURSE PRACTITIONER

## 2022-06-27 PROCEDURE — 25010000002 HEPARIN (PORCINE) 25000-0.45 UT/250ML-% SOLUTION: Performed by: INTERNAL MEDICINE

## 2022-06-27 PROCEDURE — U0004 COV-19 TEST NON-CDC HGH THRU: HCPCS | Performed by: NURSE PRACTITIONER

## 2022-06-27 PROCEDURE — 85730 THROMBOPLASTIN TIME PARTIAL: CPT | Performed by: HOSPITALIST

## 2022-06-27 RX ORDER — DIAZEPAM 2 MG/1
2 TABLET ORAL EVERY 6 HOURS PRN
Status: DISCONTINUED | OUTPATIENT
Start: 2022-06-27 | End: 2022-07-04 | Stop reason: HOSPADM

## 2022-06-27 RX ADMIN — ASPIRIN 81 MG: 81 TABLET, CHEWABLE ORAL at 08:49

## 2022-06-27 RX ADMIN — HEPARIN SODIUM 19 UNITS/KG/HR: 10000 INJECTION, SOLUTION INTRAVENOUS at 20:21

## 2022-06-27 RX ADMIN — Medication 10 ML: at 08:56

## 2022-06-27 RX ADMIN — HEPARIN SODIUM 2200 UNITS: 5000 INJECTION INTRAVENOUS; SUBCUTANEOUS at 16:31

## 2022-06-27 RX ADMIN — DIAZEPAM 2 MG: 2 TABLET ORAL at 22:24

## 2022-06-27 RX ADMIN — METOPROLOL TARTRATE 25 MG: 25 TABLET, FILM COATED ORAL at 20:22

## 2022-06-27 RX ADMIN — DIAZEPAM 2 MG: 2 TABLET ORAL at 16:30

## 2022-06-27 RX ADMIN — ZOLPIDEM TARTRATE 5 MG: 5 TABLET ORAL at 22:24

## 2022-06-27 RX ADMIN — ATORVASTATIN CALCIUM 40 MG: 20 TABLET, FILM COATED ORAL at 20:22

## 2022-06-27 RX ADMIN — AMLODIPINE BESYLATE 5 MG: 5 TABLET ORAL at 08:48

## 2022-06-27 RX ADMIN — PANTOPRAZOLE SODIUM 40 MG: 40 TABLET, DELAYED RELEASE ORAL at 08:49

## 2022-06-27 RX ADMIN — METOPROLOL TARTRATE 25 MG: 25 TABLET, FILM COATED ORAL at 08:56

## 2022-06-27 RX ADMIN — Medication 10 ML: at 20:23

## 2022-06-28 LAB
APTT PPP: 66.8 SECONDS (ref 22.7–35.4)
APTT PPP: 81.6 SECONDS (ref 22.7–35.4)
APTT PPP: 96.9 SECONDS (ref 22.7–35.4)
BASOPHILS # BLD AUTO: 0.02 10*3/MM3 (ref 0–0.2)
BASOPHILS NFR BLD AUTO: 0.4 % (ref 0–1.5)
DEPRECATED RDW RBC AUTO: 44.3 FL (ref 37–54)
EOSINOPHIL # BLD AUTO: 0.23 10*3/MM3 (ref 0–0.4)
EOSINOPHIL NFR BLD AUTO: 4.6 % (ref 0.3–6.2)
ERYTHROCYTE [DISTWIDTH] IN BLOOD BY AUTOMATED COUNT: 13.4 % (ref 12.3–15.4)
HCT VFR BLD AUTO: 36.2 % (ref 34–46.6)
HGB BLD-MCNC: 12.3 G/DL (ref 12–15.9)
IMM GRANULOCYTES # BLD AUTO: 0.01 10*3/MM3 (ref 0–0.05)
IMM GRANULOCYTES NFR BLD AUTO: 0.2 % (ref 0–0.5)
LYMPHOCYTES # BLD AUTO: 2.36 10*3/MM3 (ref 0.7–3.1)
LYMPHOCYTES NFR BLD AUTO: 46.9 % (ref 19.6–45.3)
MCH RBC QN AUTO: 30.9 PG (ref 26.6–33)
MCHC RBC AUTO-ENTMCNC: 34 G/DL (ref 31.5–35.7)
MCV RBC AUTO: 91 FL (ref 79–97)
MONOCYTES # BLD AUTO: 0.57 10*3/MM3 (ref 0.1–0.9)
MONOCYTES NFR BLD AUTO: 11.3 % (ref 5–12)
NEUTROPHILS NFR BLD AUTO: 1.84 10*3/MM3 (ref 1.7–7)
NEUTROPHILS NFR BLD AUTO: 36.6 % (ref 42.7–76)
NRBC BLD AUTO-RTO: 0 /100 WBC (ref 0–0.2)
PLATELET # BLD AUTO: 275 10*3/MM3 (ref 140–450)
PMV BLD AUTO: 9.3 FL (ref 6–12)
RBC # BLD AUTO: 3.98 10*6/MM3 (ref 3.77–5.28)
SARS-COV-2 ORF1AB RESP QL NAA+PROBE: NOT DETECTED
WBC NRBC COR # BLD: 5.03 10*3/MM3 (ref 3.4–10.8)

## 2022-06-28 PROCEDURE — 99232 SBSQ HOSP IP/OBS MODERATE 35: CPT | Performed by: NURSE PRACTITIONER

## 2022-06-28 PROCEDURE — 25010000002 HEPARIN (PORCINE) 25000-0.45 UT/250ML-% SOLUTION: Performed by: NURSE PRACTITIONER

## 2022-06-28 PROCEDURE — 25010000002 HEPARIN (PORCINE) PER 1000 UNITS: Performed by: INTERNAL MEDICINE

## 2022-06-28 PROCEDURE — 85730 THROMBOPLASTIN TIME PARTIAL: CPT | Performed by: HOSPITALIST

## 2022-06-28 PROCEDURE — 85025 COMPLETE CBC W/AUTO DIFF WBC: CPT | Performed by: INTERNAL MEDICINE

## 2022-06-28 RX ORDER — ACETAMINOPHEN 500 MG
1000 TABLET ORAL ONCE
Status: COMPLETED | OUTPATIENT
Start: 2022-06-29 | End: 2022-06-29

## 2022-06-28 RX ORDER — CELECOXIB 200 MG/1
200 CAPSULE ORAL ONCE
Status: COMPLETED | OUTPATIENT
Start: 2022-06-29 | End: 2022-06-29

## 2022-06-28 RX ORDER — ACETAMINOPHEN 500 MG
1000 TABLET ORAL ONCE
Status: DISCONTINUED | OUTPATIENT
Start: 2022-06-28 | End: 2022-06-28

## 2022-06-28 RX ORDER — CELECOXIB 200 MG/1
200 CAPSULE ORAL ONCE
Status: DISCONTINUED | OUTPATIENT
Start: 2022-06-28 | End: 2022-06-28

## 2022-06-28 RX ORDER — GABAPENTIN 300 MG/1
300 CAPSULE ORAL ONCE
Status: DISCONTINUED | OUTPATIENT
Start: 2022-06-28 | End: 2022-06-28

## 2022-06-28 RX ORDER — SODIUM CHLORIDE 9 MG/ML
75 INJECTION, SOLUTION INTRAVENOUS ONCE
Status: COMPLETED | OUTPATIENT
Start: 2022-06-29 | End: 2022-06-28

## 2022-06-28 RX ORDER — GABAPENTIN 300 MG/1
300 CAPSULE ORAL ONCE
Status: COMPLETED | OUTPATIENT
Start: 2022-06-29 | End: 2022-06-29

## 2022-06-28 RX ADMIN — ATORVASTATIN CALCIUM 40 MG: 20 TABLET, FILM COATED ORAL at 20:55

## 2022-06-28 RX ADMIN — DIAZEPAM 2 MG: 2 TABLET ORAL at 09:11

## 2022-06-28 RX ADMIN — ZOLPIDEM TARTRATE 5 MG: 5 TABLET ORAL at 20:56

## 2022-06-28 RX ADMIN — METOPROLOL TARTRATE 25 MG: 25 TABLET, FILM COATED ORAL at 09:09

## 2022-06-28 RX ADMIN — ASPIRIN 81 MG: 81 TABLET, CHEWABLE ORAL at 09:09

## 2022-06-28 RX ADMIN — HEPARIN SODIUM 2200 UNITS: 5000 INJECTION INTRAVENOUS; SUBCUTANEOUS at 14:58

## 2022-06-28 RX ADMIN — DIAZEPAM 2 MG: 2 TABLET ORAL at 20:56

## 2022-06-28 RX ADMIN — Medication 10 ML: at 09:09

## 2022-06-28 RX ADMIN — AMLODIPINE BESYLATE 5 MG: 5 TABLET ORAL at 09:09

## 2022-06-28 RX ADMIN — METOPROLOL TARTRATE 25 MG: 25 TABLET, FILM COATED ORAL at 20:56

## 2022-06-28 RX ADMIN — Medication 10 ML: at 20:56

## 2022-06-28 RX ADMIN — PANTOPRAZOLE SODIUM 40 MG: 40 TABLET, DELAYED RELEASE ORAL at 06:08

## 2022-06-28 RX ADMIN — SODIUM CHLORIDE 75 ML/HR: 9 INJECTION, SOLUTION INTRAVENOUS at 23:42

## 2022-06-29 ENCOUNTER — APPOINTMENT (OUTPATIENT)
Dept: GENERAL RADIOLOGY | Facility: HOSPITAL | Age: 72
End: 2022-06-29

## 2022-06-29 ENCOUNTER — ANESTHESIA (OUTPATIENT)
Dept: PERIOP | Facility: HOSPITAL | Age: 72
End: 2022-06-29

## 2022-06-29 ENCOUNTER — ANESTHESIA EVENT (OUTPATIENT)
Dept: PERIOP | Facility: HOSPITAL | Age: 72
End: 2022-06-29

## 2022-06-29 LAB
ANION GAP SERPL CALCULATED.3IONS-SCNC: 9.4 MMOL/L (ref 5–15)
APTT PPP: 66.8 SECONDS (ref 22.7–35.4)
BASOPHILS # BLD AUTO: 0.02 10*3/MM3 (ref 0–0.2)
BASOPHILS NFR BLD AUTO: 0.3 % (ref 0–1.5)
BUN SERPL-MCNC: 15 MG/DL (ref 8–23)
BUN/CREAT SERPL: 20.5 (ref 7–25)
CALCIUM SPEC-SCNC: 9.2 MG/DL (ref 8.6–10.5)
CHLORIDE SERPL-SCNC: 105 MMOL/L (ref 98–107)
CO2 SERPL-SCNC: 26.6 MMOL/L (ref 22–29)
CREAT SERPL-MCNC: 0.73 MG/DL (ref 0.57–1)
DEPRECATED RDW RBC AUTO: 45.3 FL (ref 37–54)
EGFRCR SERPLBLD CKD-EPI 2021: 88.6 ML/MIN/1.73
EOSINOPHIL # BLD AUTO: 0.24 10*3/MM3 (ref 0–0.4)
EOSINOPHIL NFR BLD AUTO: 4.1 % (ref 0.3–6.2)
ERYTHROCYTE [DISTWIDTH] IN BLOOD BY AUTOMATED COUNT: 13.4 % (ref 12.3–15.4)
GLUCOSE SERPL-MCNC: 83 MG/DL (ref 65–99)
HCT VFR BLD AUTO: 36.5 % (ref 34–46.6)
HGB BLD-MCNC: 12 G/DL (ref 12–15.9)
IMM GRANULOCYTES # BLD AUTO: 0.02 10*3/MM3 (ref 0–0.05)
IMM GRANULOCYTES NFR BLD AUTO: 0.3 % (ref 0–0.5)
INR PPP: 0.98 (ref 0.9–1.1)
LYMPHOCYTES # BLD AUTO: 2.45 10*3/MM3 (ref 0.7–3.1)
LYMPHOCYTES NFR BLD AUTO: 41.4 % (ref 19.6–45.3)
MCH RBC QN AUTO: 30.5 PG (ref 26.6–33)
MCHC RBC AUTO-ENTMCNC: 32.9 G/DL (ref 31.5–35.7)
MCV RBC AUTO: 92.6 FL (ref 79–97)
MONOCYTES # BLD AUTO: 0.63 10*3/MM3 (ref 0.1–0.9)
MONOCYTES NFR BLD AUTO: 10.6 % (ref 5–12)
NEUTROPHILS NFR BLD AUTO: 2.56 10*3/MM3 (ref 1.7–7)
NEUTROPHILS NFR BLD AUTO: 43.3 % (ref 42.7–76)
NRBC BLD AUTO-RTO: 0 /100 WBC (ref 0–0.2)
PLATELET # BLD AUTO: 278 10*3/MM3 (ref 140–450)
PMV BLD AUTO: 9.6 FL (ref 6–12)
POTASSIUM SERPL-SCNC: 3.8 MMOL/L (ref 3.5–5.2)
PROTHROMBIN TIME: 12.9 SECONDS (ref 11.7–14.2)
RBC # BLD AUTO: 3.94 10*6/MM3 (ref 3.77–5.28)
SODIUM SERPL-SCNC: 141 MMOL/L (ref 136–145)
WBC NRBC COR # BLD: 5.92 10*3/MM3 (ref 3.4–10.8)

## 2022-06-29 PROCEDURE — 88312 SPECIAL STAINS GROUP 1: CPT | Performed by: THORACIC SURGERY (CARDIOTHORACIC VASCULAR SURGERY)

## 2022-06-29 PROCEDURE — 32608 THORACOSCOPY W/BX NODULE: CPT | Performed by: THORACIC SURGERY (CARDIOTHORACIC VASCULAR SURGERY)

## 2022-06-29 PROCEDURE — C1729 CATH, DRAINAGE: HCPCS | Performed by: THORACIC SURGERY (CARDIOTHORACIC VASCULAR SURGERY)

## 2022-06-29 PROCEDURE — 25010000002 PHENYLEPHRINE 10 MG/ML SOLUTION: Performed by: ANESTHESIOLOGY

## 2022-06-29 PROCEDURE — 88307 TISSUE EXAM BY PATHOLOGIST: CPT | Performed by: THORACIC SURGERY (CARDIOTHORACIC VASCULAR SURGERY)

## 2022-06-29 PROCEDURE — 32674 THORACOSCOPY LYMPH NODE EXC: CPT | Performed by: REGISTERED NURSE

## 2022-06-29 PROCEDURE — 88305 TISSUE EXAM BY PATHOLOGIST: CPT | Performed by: THORACIC SURGERY (CARDIOTHORACIC VASCULAR SURGERY)

## 2022-06-29 PROCEDURE — C1894 INTRO/SHEATH, NON-LASER: HCPCS | Performed by: THORACIC SURGERY (CARDIOTHORACIC VASCULAR SURGERY)

## 2022-06-29 PROCEDURE — 71045 X-RAY EXAM CHEST 1 VIEW: CPT

## 2022-06-29 PROCEDURE — 76942 ECHO GUIDE FOR BIOPSY: CPT | Performed by: THORACIC SURGERY (CARDIOTHORACIC VASCULAR SURGERY)

## 2022-06-29 PROCEDURE — 07B74ZX EXCISION OF THORAX LYMPHATIC, PERCUTANEOUS ENDOSCOPIC APPROACH, DIAGNOSTIC: ICD-10-PCS | Performed by: THORACIC SURGERY (CARDIOTHORACIC VASCULAR SURGERY)

## 2022-06-29 PROCEDURE — 25010000002 FENTANYL CITRATE (PF) 50 MCG/ML SOLUTION: Performed by: ANESTHESIOLOGY

## 2022-06-29 PROCEDURE — 88342 IMHCHEM/IMCYTCHM 1ST ANTB: CPT | Performed by: THORACIC SURGERY (CARDIOTHORACIC VASCULAR SURGERY)

## 2022-06-29 PROCEDURE — 25010000002 NEOSTIGMINE 5 MG/10ML SOLUTION: Performed by: ANESTHESIOLOGY

## 2022-06-29 PROCEDURE — 88331 PATH CONSLTJ SURG 1 BLK 1SPC: CPT | Performed by: THORACIC SURGERY (CARDIOTHORACIC VASCULAR SURGERY)

## 2022-06-29 PROCEDURE — 25010000002 DEXAMETHASONE PER 1 MG: Performed by: ANESTHESIOLOGY

## 2022-06-29 PROCEDURE — 0BJ08ZZ INSPECTION OF TRACHEOBRONCHIAL TREE, VIA NATURAL OR ARTIFICIAL OPENING ENDOSCOPIC: ICD-10-PCS | Performed by: THORACIC SURGERY (CARDIOTHORACIC VASCULAR SURGERY)

## 2022-06-29 PROCEDURE — 25010000002 KETOROLAC TROMETHAMINE PER 15 MG: Performed by: THORACIC SURGERY (CARDIOTHORACIC VASCULAR SURGERY)

## 2022-06-29 PROCEDURE — 85730 THROMBOPLASTIN TIME PARTIAL: CPT | Performed by: HOSPITALIST

## 2022-06-29 PROCEDURE — 32674 THORACOSCOPY LYMPH NODE EXC: CPT | Performed by: THORACIC SURGERY (CARDIOTHORACIC VASCULAR SURGERY)

## 2022-06-29 PROCEDURE — C9290 INJ, BUPIVACAINE LIPOSOME: HCPCS | Performed by: ANESTHESIOLOGY

## 2022-06-29 PROCEDURE — 25010000002 ONDANSETRON PER 1 MG: Performed by: STUDENT IN AN ORGANIZED HEALTH CARE EDUCATION/TRAINING PROGRAM

## 2022-06-29 PROCEDURE — 25010000002 HEPARIN (PORCINE) 25000-0.45 UT/250ML-% SOLUTION: Performed by: NURSE PRACTITIONER

## 2022-06-29 PROCEDURE — 25010000002 ONDANSETRON PER 1 MG: Performed by: ANESTHESIOLOGY

## 2022-06-29 PROCEDURE — 25010000002 PROPOFOL 10 MG/ML EMULSION: Performed by: ANESTHESIOLOGY

## 2022-06-29 PROCEDURE — 80048 BASIC METABOLIC PNL TOTAL CA: CPT | Performed by: NURSE PRACTITIONER

## 2022-06-29 PROCEDURE — 3E0T3BZ INTRODUCTION OF ANESTHETIC AGENT INTO PERIPHERAL NERVES AND PLEXI, PERCUTANEOUS APPROACH: ICD-10-PCS | Performed by: THORACIC SURGERY (CARDIOTHORACIC VASCULAR SURGERY)

## 2022-06-29 PROCEDURE — 32608 THORACOSCOPY W/BX NODULE: CPT | Performed by: REGISTERED NURSE

## 2022-06-29 PROCEDURE — 25010000002 CEFAZOLIN IN DEXTROSE 2-4 GM/100ML-% SOLUTION: Performed by: NURSE PRACTITIONER

## 2022-06-29 PROCEDURE — 85025 COMPLETE CBC W/AUTO DIFF WBC: CPT | Performed by: INTERNAL MEDICINE

## 2022-06-29 PROCEDURE — 88311 DECALCIFY TISSUE: CPT | Performed by: THORACIC SURGERY (CARDIOTHORACIC VASCULAR SURGERY)

## 2022-06-29 PROCEDURE — 25010000002 MIDAZOLAM PER 1 MG: Performed by: ANESTHESIOLOGY

## 2022-06-29 PROCEDURE — 88341 IMHCHEM/IMCYTCHM EA ADD ANTB: CPT | Performed by: THORACIC SURGERY (CARDIOTHORACIC VASCULAR SURGERY)

## 2022-06-29 PROCEDURE — 0 BUPIVACAINE LIPOSOME 1.3 % SUSPENSION: Performed by: ANESTHESIOLOGY

## 2022-06-29 PROCEDURE — 0BBF4ZZ EXCISION OF RIGHT LOWER LUNG LOBE, PERCUTANEOUS ENDOSCOPIC APPROACH: ICD-10-PCS | Performed by: THORACIC SURGERY (CARDIOTHORACIC VASCULAR SURGERY)

## 2022-06-29 PROCEDURE — 25010000002 HYDROMORPHONE PER 4 MG: Performed by: STUDENT IN AN ORGANIZED HEALTH CARE EDUCATION/TRAINING PROGRAM

## 2022-06-29 PROCEDURE — 25010000002 MAGNESIUM SULFATE PER 500 MG OF MAGNESIUM: Performed by: ANESTHESIOLOGY

## 2022-06-29 PROCEDURE — 85610 PROTHROMBIN TIME: CPT | Performed by: NURSE PRACTITIONER

## 2022-06-29 DEVICE — ABSORBABLE HEMOSTAT (OXIDIZED REGENERATED CELLULOSE, U.S.P.)
Type: IMPLANTABLE DEVICE | Site: LUNG | Status: FUNCTIONAL
Brand: SURGICEL

## 2022-06-29 DEVICE — SUREFORM 45 RELOAD GREEN
Type: IMPLANTABLE DEVICE | Site: LUNG | Status: FUNCTIONAL
Brand: SUREFORM

## 2022-06-29 DEVICE — STAPLER 30 RELOAD GREEN
Type: IMPLANTABLE DEVICE | Site: LUNG | Status: FUNCTIONAL
Brand: ENDOWRIST

## 2022-06-29 RX ORDER — MIDAZOLAM HYDROCHLORIDE 1 MG/ML
0.5 INJECTION INTRAMUSCULAR; INTRAVENOUS
Status: DISCONTINUED | OUTPATIENT
Start: 2022-06-29 | End: 2022-06-29 | Stop reason: HOSPADM

## 2022-06-29 RX ORDER — ONDANSETRON 2 MG/ML
INJECTION INTRAMUSCULAR; INTRAVENOUS AS NEEDED
Status: DISCONTINUED | OUTPATIENT
Start: 2022-06-29 | End: 2022-06-29 | Stop reason: SURG

## 2022-06-29 RX ORDER — FENTANYL CITRATE 50 UG/ML
50 INJECTION, SOLUTION INTRAMUSCULAR; INTRAVENOUS
Status: DISCONTINUED | OUTPATIENT
Start: 2022-06-29 | End: 2022-06-29 | Stop reason: HOSPADM

## 2022-06-29 RX ORDER — NALOXONE HCL 0.4 MG/ML
0.4 VIAL (ML) INJECTION
Status: DISCONTINUED | OUTPATIENT
Start: 2022-06-29 | End: 2022-06-29 | Stop reason: HOSPADM

## 2022-06-29 RX ORDER — PROMETHAZINE HYDROCHLORIDE 25 MG/1
25 TABLET ORAL ONCE AS NEEDED
Status: DISCONTINUED | OUTPATIENT
Start: 2022-06-29 | End: 2022-06-29 | Stop reason: HOSPADM

## 2022-06-29 RX ORDER — DIPHENHYDRAMINE HCL 25 MG
25 CAPSULE ORAL
Status: DISCONTINUED | OUTPATIENT
Start: 2022-06-29 | End: 2022-06-29 | Stop reason: HOSPADM

## 2022-06-29 RX ORDER — POLYETHYLENE GLYCOL 3350 17 G/17G
17 POWDER, FOR SOLUTION ORAL DAILY
Status: DISCONTINUED | OUTPATIENT
Start: 2022-06-30 | End: 2022-07-04 | Stop reason: HOSPADM

## 2022-06-29 RX ORDER — ONDANSETRON 2 MG/ML
4 INJECTION INTRAMUSCULAR; INTRAVENOUS EVERY 6 HOURS PRN
Status: DISCONTINUED | OUTPATIENT
Start: 2022-06-29 | End: 2022-07-04 | Stop reason: HOSPADM

## 2022-06-29 RX ORDER — KETOROLAC TROMETHAMINE 15 MG/ML
15 INJECTION, SOLUTION INTRAMUSCULAR; INTRAVENOUS EVERY 6 HOURS
Status: COMPLETED | OUTPATIENT
Start: 2022-06-29 | End: 2022-06-30

## 2022-06-29 RX ORDER — FAMOTIDINE 10 MG/ML
20 INJECTION, SOLUTION INTRAVENOUS ONCE
Status: COMPLETED | OUTPATIENT
Start: 2022-06-29 | End: 2022-06-29

## 2022-06-29 RX ORDER — LIDOCAINE HYDROCHLORIDE 20 MG/ML
INJECTION, SOLUTION INFILTRATION; PERINEURAL AS NEEDED
Status: DISCONTINUED | OUTPATIENT
Start: 2022-06-29 | End: 2022-06-29 | Stop reason: SURG

## 2022-06-29 RX ORDER — BUPIVACAINE HYDROCHLORIDE 2.5 MG/ML
INJECTION, SOLUTION EPIDURAL; INFILTRATION; INTRACAUDAL
Status: COMPLETED | OUTPATIENT
Start: 2022-06-29 | End: 2022-06-29

## 2022-06-29 RX ORDER — FENTANYL CITRATE 50 UG/ML
INJECTION, SOLUTION INTRAMUSCULAR; INTRAVENOUS
Status: COMPLETED | OUTPATIENT
Start: 2022-06-29 | End: 2022-06-29

## 2022-06-29 RX ORDER — SODIUM CHLORIDE, SODIUM LACTATE, POTASSIUM CHLORIDE, CALCIUM CHLORIDE 600; 310; 30; 20 MG/100ML; MG/100ML; MG/100ML; MG/100ML
INJECTION, SOLUTION INTRAVENOUS CONTINUOUS PRN
Status: DISCONTINUED | OUTPATIENT
Start: 2022-06-29 | End: 2022-06-29 | Stop reason: SURG

## 2022-06-29 RX ORDER — SODIUM CHLORIDE 0.9 % (FLUSH) 0.9 %
3 SYRINGE (ML) INJECTION EVERY 12 HOURS SCHEDULED
Status: DISCONTINUED | OUTPATIENT
Start: 2022-06-29 | End: 2022-06-29 | Stop reason: HOSPADM

## 2022-06-29 RX ORDER — FENTANYL CITRATE 50 UG/ML
INJECTION, SOLUTION INTRAMUSCULAR; INTRAVENOUS AS NEEDED
Status: DISCONTINUED | OUTPATIENT
Start: 2022-06-29 | End: 2022-06-29 | Stop reason: SURG

## 2022-06-29 RX ORDER — NALOXONE HCL 0.4 MG/ML
0.2 VIAL (ML) INJECTION AS NEEDED
Status: DISCONTINUED | OUTPATIENT
Start: 2022-06-29 | End: 2022-06-29 | Stop reason: HOSPADM

## 2022-06-29 RX ORDER — KETAMINE HYDROCHLORIDE 10 MG/ML
INJECTION INTRAMUSCULAR; INTRAVENOUS AS NEEDED
Status: DISCONTINUED | OUTPATIENT
Start: 2022-06-29 | End: 2022-06-29 | Stop reason: SURG

## 2022-06-29 RX ORDER — OXYCODONE HYDROCHLORIDE 5 MG/1
5 TABLET ORAL EVERY 4 HOURS PRN
Status: DISCONTINUED | OUTPATIENT
Start: 2022-06-29 | End: 2022-07-04 | Stop reason: HOSPADM

## 2022-06-29 RX ORDER — LABETALOL HYDROCHLORIDE 5 MG/ML
5 INJECTION, SOLUTION INTRAVENOUS
Status: DISCONTINUED | OUTPATIENT
Start: 2022-06-29 | End: 2022-06-29 | Stop reason: HOSPADM

## 2022-06-29 RX ORDER — MIDAZOLAM HYDROCHLORIDE 1 MG/ML
INJECTION INTRAMUSCULAR; INTRAVENOUS
Status: COMPLETED | OUTPATIENT
Start: 2022-06-29 | End: 2022-06-29

## 2022-06-29 RX ORDER — SODIUM CHLORIDE 0.9 % (FLUSH) 0.9 %
3-10 SYRINGE (ML) INJECTION AS NEEDED
Status: DISCONTINUED | OUTPATIENT
Start: 2022-06-29 | End: 2022-06-29 | Stop reason: HOSPADM

## 2022-06-29 RX ORDER — MAGNESIUM SULFATE HEPTAHYDRATE 500 MG/ML
INJECTION, SOLUTION INTRAMUSCULAR; INTRAVENOUS AS NEEDED
Status: DISCONTINUED | OUTPATIENT
Start: 2022-06-29 | End: 2022-06-29 | Stop reason: SURG

## 2022-06-29 RX ORDER — DIPHENHYDRAMINE HCL 25 MG
25 CAPSULE ORAL EVERY 4 HOURS PRN
Status: DISCONTINUED | OUTPATIENT
Start: 2022-06-29 | End: 2022-06-29 | Stop reason: HOSPADM

## 2022-06-29 RX ORDER — METOCLOPRAMIDE HYDROCHLORIDE 5 MG/ML
10 INJECTION INTRAMUSCULAR; INTRAVENOUS ONCE AS NEEDED
Status: DISCONTINUED | OUTPATIENT
Start: 2022-06-29 | End: 2022-06-29 | Stop reason: HOSPADM

## 2022-06-29 RX ORDER — LIDOCAINE HYDROCHLORIDE 10 MG/ML
0.5 INJECTION, SOLUTION EPIDURAL; INFILTRATION; INTRACAUDAL; PERINEURAL ONCE AS NEEDED
Status: DISCONTINUED | OUTPATIENT
Start: 2022-06-29 | End: 2022-06-29 | Stop reason: HOSPADM

## 2022-06-29 RX ORDER — HYDROMORPHONE HYDROCHLORIDE 1 MG/ML
0.5 INJECTION, SOLUTION INTRAMUSCULAR; INTRAVENOUS; SUBCUTANEOUS
Status: DISCONTINUED | OUTPATIENT
Start: 2022-06-29 | End: 2022-07-04 | Stop reason: HOSPADM

## 2022-06-29 RX ORDER — ROCURONIUM BROMIDE 10 MG/ML
INJECTION, SOLUTION INTRAVENOUS AS NEEDED
Status: DISCONTINUED | OUTPATIENT
Start: 2022-06-29 | End: 2022-06-29 | Stop reason: SURG

## 2022-06-29 RX ORDER — IBUPROFEN 600 MG/1
600 TABLET ORAL ONCE AS NEEDED
Status: DISCONTINUED | OUTPATIENT
Start: 2022-06-29 | End: 2022-06-29 | Stop reason: HOSPADM

## 2022-06-29 RX ORDER — DIPHENHYDRAMINE HYDROCHLORIDE 50 MG/ML
12.5 INJECTION INTRAMUSCULAR; INTRAVENOUS
Status: DISCONTINUED | OUTPATIENT
Start: 2022-06-29 | End: 2022-06-29 | Stop reason: HOSPADM

## 2022-06-29 RX ORDER — FLUMAZENIL 0.1 MG/ML
0.2 INJECTION INTRAVENOUS AS NEEDED
Status: DISCONTINUED | OUTPATIENT
Start: 2022-06-29 | End: 2022-06-29 | Stop reason: HOSPADM

## 2022-06-29 RX ORDER — OXYCODONE HYDROCHLORIDE 5 MG/1
5 TABLET ORAL ONCE AS NEEDED
Status: COMPLETED | OUTPATIENT
Start: 2022-06-29 | End: 2022-06-29

## 2022-06-29 RX ORDER — DOCUSATE SODIUM 100 MG/1
100 CAPSULE, LIQUID FILLED ORAL DAILY
Status: DISCONTINUED | OUTPATIENT
Start: 2022-06-30 | End: 2022-07-04 | Stop reason: HOSPADM

## 2022-06-29 RX ORDER — HYDRALAZINE HYDROCHLORIDE 20 MG/ML
5 INJECTION INTRAMUSCULAR; INTRAVENOUS
Status: DISCONTINUED | OUTPATIENT
Start: 2022-06-29 | End: 2022-06-29 | Stop reason: HOSPADM

## 2022-06-29 RX ORDER — ACETAMINOPHEN 500 MG
1000 TABLET ORAL 3 TIMES DAILY
Status: DISCONTINUED | OUTPATIENT
Start: 2022-06-29 | End: 2022-07-04 | Stop reason: HOSPADM

## 2022-06-29 RX ORDER — SODIUM CHLORIDE 9 MG/ML
INJECTION, SOLUTION INTRAVENOUS AS NEEDED
Status: DISCONTINUED | OUTPATIENT
Start: 2022-06-29 | End: 2022-06-29 | Stop reason: HOSPADM

## 2022-06-29 RX ORDER — HYDROMORPHONE HYDROCHLORIDE 1 MG/ML
0.25 INJECTION, SOLUTION INTRAMUSCULAR; INTRAVENOUS; SUBCUTANEOUS
Status: DISCONTINUED | OUTPATIENT
Start: 2022-06-29 | End: 2022-06-29 | Stop reason: HOSPADM

## 2022-06-29 RX ORDER — BISACODYL 10 MG
10 SUPPOSITORY, RECTAL RECTAL DAILY PRN
Status: DISCONTINUED | OUTPATIENT
Start: 2022-06-29 | End: 2022-07-04 | Stop reason: HOSPADM

## 2022-06-29 RX ORDER — ONDANSETRON 2 MG/ML
4 INJECTION INTRAMUSCULAR; INTRAVENOUS ONCE AS NEEDED
Status: COMPLETED | OUTPATIENT
Start: 2022-06-29 | End: 2022-06-29

## 2022-06-29 RX ORDER — NEOSTIGMINE METHYLSULFATE 0.5 MG/ML
INJECTION, SOLUTION INTRAVENOUS AS NEEDED
Status: DISCONTINUED | OUTPATIENT
Start: 2022-06-29 | End: 2022-06-29 | Stop reason: SURG

## 2022-06-29 RX ORDER — DEXAMETHASONE SODIUM PHOSPHATE 10 MG/ML
INJECTION INTRAMUSCULAR; INTRAVENOUS AS NEEDED
Status: DISCONTINUED | OUTPATIENT
Start: 2022-06-29 | End: 2022-06-29 | Stop reason: SURG

## 2022-06-29 RX ORDER — OXYCODONE AND ACETAMINOPHEN 7.5; 325 MG/1; MG/1
1 TABLET ORAL EVERY 4 HOURS PRN
Status: DISCONTINUED | OUTPATIENT
Start: 2022-06-29 | End: 2022-06-29 | Stop reason: HOSPADM

## 2022-06-29 RX ORDER — GLYCOPYRROLATE 0.2 MG/ML
INJECTION INTRAMUSCULAR; INTRAVENOUS AS NEEDED
Status: DISCONTINUED | OUTPATIENT
Start: 2022-06-29 | End: 2022-06-29 | Stop reason: SURG

## 2022-06-29 RX ORDER — PROMETHAZINE HYDROCHLORIDE 25 MG/1
25 SUPPOSITORY RECTAL ONCE AS NEEDED
Status: DISCONTINUED | OUTPATIENT
Start: 2022-06-29 | End: 2022-06-29 | Stop reason: HOSPADM

## 2022-06-29 RX ORDER — CEFAZOLIN SODIUM 2 G/100ML
2 INJECTION, SOLUTION INTRAVENOUS ONCE
Status: COMPLETED | OUTPATIENT
Start: 2022-06-29 | End: 2022-06-29

## 2022-06-29 RX ORDER — HYDROMORPHONE HYDROCHLORIDE 1 MG/ML
0.5 INJECTION, SOLUTION INTRAMUSCULAR; INTRAVENOUS; SUBCUTANEOUS
Status: DISCONTINUED | OUTPATIENT
Start: 2022-06-29 | End: 2022-06-29 | Stop reason: HOSPADM

## 2022-06-29 RX ORDER — SODIUM CHLORIDE, SODIUM LACTATE, POTASSIUM CHLORIDE, CALCIUM CHLORIDE 600; 310; 30; 20 MG/100ML; MG/100ML; MG/100ML; MG/100ML
9 INJECTION, SOLUTION INTRAVENOUS CONTINUOUS
Status: DISCONTINUED | OUTPATIENT
Start: 2022-06-29 | End: 2022-07-04 | Stop reason: HOSPADM

## 2022-06-29 RX ORDER — GABAPENTIN 100 MG/1
200 CAPSULE ORAL EVERY 8 HOURS SCHEDULED
Status: DISCONTINUED | OUTPATIENT
Start: 2022-06-29 | End: 2022-07-04 | Stop reason: HOSPADM

## 2022-06-29 RX ORDER — DIPHENHYDRAMINE HYDROCHLORIDE 50 MG/ML
25 INJECTION INTRAMUSCULAR; INTRAVENOUS EVERY 4 HOURS PRN
Status: DISCONTINUED | OUTPATIENT
Start: 2022-06-29 | End: 2022-06-29 | Stop reason: HOSPADM

## 2022-06-29 RX ORDER — PHENYLEPHRINE HYDROCHLORIDE 10 MG/ML
INJECTION INTRAVENOUS AS NEEDED
Status: DISCONTINUED | OUTPATIENT
Start: 2022-06-29 | End: 2022-06-29 | Stop reason: SURG

## 2022-06-29 RX ORDER — ONDANSETRON 4 MG/1
4 TABLET, FILM COATED ORAL EVERY 6 HOURS PRN
Status: DISCONTINUED | OUTPATIENT
Start: 2022-06-29 | End: 2022-07-04 | Stop reason: HOSPADM

## 2022-06-29 RX ORDER — EPHEDRINE SULFATE 50 MG/ML
INJECTION, SOLUTION INTRAVENOUS AS NEEDED
Status: DISCONTINUED | OUTPATIENT
Start: 2022-06-29 | End: 2022-06-29 | Stop reason: SURG

## 2022-06-29 RX ORDER — BUPIVACAINE HYDROCHLORIDE 2.5 MG/ML
INJECTION, SOLUTION EPIDURAL; INFILTRATION; INTRACAUDAL AS NEEDED
Status: DISCONTINUED | OUTPATIENT
Start: 2022-06-29 | End: 2022-06-29 | Stop reason: HOSPADM

## 2022-06-29 RX ORDER — SODIUM CHLORIDE 9 MG/ML
100 INJECTION, SOLUTION INTRAVENOUS CONTINUOUS
Status: DISCONTINUED | OUTPATIENT
Start: 2022-06-29 | End: 2022-07-01

## 2022-06-29 RX ORDER — HYDROCODONE BITARTRATE AND ACETAMINOPHEN 7.5; 325 MG/1; MG/1
1 TABLET ORAL ONCE AS NEEDED
Status: DISCONTINUED | OUTPATIENT
Start: 2022-06-29 | End: 2022-06-29 | Stop reason: HOSPADM

## 2022-06-29 RX ORDER — EPHEDRINE SULFATE 50 MG/ML
5 INJECTION, SOLUTION INTRAVENOUS ONCE AS NEEDED
Status: DISCONTINUED | OUTPATIENT
Start: 2022-06-29 | End: 2022-06-29 | Stop reason: HOSPADM

## 2022-06-29 RX ORDER — PROPOFOL 10 MG/ML
VIAL (ML) INTRAVENOUS AS NEEDED
Status: DISCONTINUED | OUTPATIENT
Start: 2022-06-29 | End: 2022-06-29 | Stop reason: SURG

## 2022-06-29 RX ADMIN — ACETAMINOPHEN 1000 MG: 500 TABLET ORAL at 21:41

## 2022-06-29 RX ADMIN — KETAMINE HYDROCHLORIDE 10 MG: 10 INJECTION INTRAMUSCULAR; INTRAVENOUS at 14:06

## 2022-06-29 RX ADMIN — HYDROMORPHONE HYDROCHLORIDE 0.25 MG: 1 INJECTION, SOLUTION INTRAMUSCULAR; INTRAVENOUS; SUBCUTANEOUS at 16:05

## 2022-06-29 RX ADMIN — ZOLPIDEM TARTRATE 5 MG: 5 TABLET ORAL at 21:42

## 2022-06-29 RX ADMIN — HYDROMORPHONE HYDROCHLORIDE 0.25 MG: 1 INJECTION, SOLUTION INTRAMUSCULAR; INTRAVENOUS; SUBCUTANEOUS at 15:35

## 2022-06-29 RX ADMIN — PHENYLEPHRINE HYDROCHLORIDE 100 MCG: 10 INJECTION, SOLUTION INTRAVENOUS at 13:07

## 2022-06-29 RX ADMIN — SODIUM CHLORIDE, POTASSIUM CHLORIDE, SODIUM LACTATE AND CALCIUM CHLORIDE: 600; 310; 30; 20 INJECTION, SOLUTION INTRAVENOUS at 13:40

## 2022-06-29 RX ADMIN — BUPIVACAINE 10 ML: 13.3 INJECTION, SUSPENSION, LIPOSOMAL INFILTRATION at 11:05

## 2022-06-29 RX ADMIN — DIAZEPAM 2 MG: 2 TABLET ORAL at 21:44

## 2022-06-29 RX ADMIN — PHENYLEPHRINE HYDROCHLORIDE 100 MCG: 10 INJECTION, SOLUTION INTRAVENOUS at 13:49

## 2022-06-29 RX ADMIN — EPHEDRINE SULFATE 5 MG: 50 INJECTION INTRAVENOUS at 13:51

## 2022-06-29 RX ADMIN — HYDROMORPHONE HYDROCHLORIDE 0.25 MG: 1 INJECTION, SOLUTION INTRAMUSCULAR; INTRAVENOUS; SUBCUTANEOUS at 19:42

## 2022-06-29 RX ADMIN — LIDOCAINE HYDROCHLORIDE 60 MG: 20 INJECTION, SOLUTION INFILTRATION; PERINEURAL at 11:55

## 2022-06-29 RX ADMIN — KETOROLAC TROMETHAMINE 15 MG: 15 INJECTION, SOLUTION INTRAMUSCULAR; INTRAVENOUS at 21:43

## 2022-06-29 RX ADMIN — GABAPENTIN 300 MG: 300 CAPSULE ORAL at 08:12

## 2022-06-29 RX ADMIN — ONDANSETRON 4 MG: 2 INJECTION INTRAMUSCULAR; INTRAVENOUS at 14:06

## 2022-06-29 RX ADMIN — ASPIRIN 81 MG: 81 TABLET, CHEWABLE ORAL at 08:12

## 2022-06-29 RX ADMIN — MIDAZOLAM 1 MG: 1 INJECTION INTRAMUSCULAR; INTRAVENOUS at 11:04

## 2022-06-29 RX ADMIN — GLYCOPYRROLATE 0.6 MG: 0.2 INJECTION INTRAMUSCULAR; INTRAVENOUS at 14:16

## 2022-06-29 RX ADMIN — OXYCODONE HYDROCHLORIDE 5 MG: 5 TABLET ORAL at 17:17

## 2022-06-29 RX ADMIN — MAGNESIUM SULFATE HEPTAHYDRATE 2 G: 500 INJECTION, SOLUTION INTRAMUSCULAR; INTRAVENOUS at 12:13

## 2022-06-29 RX ADMIN — KETAMINE HYDROCHLORIDE 20 MG: 10 INJECTION INTRAMUSCULAR; INTRAVENOUS at 11:55

## 2022-06-29 RX ADMIN — KETAMINE HYDROCHLORIDE 10 MG: 10 INJECTION INTRAMUSCULAR; INTRAVENOUS at 12:25

## 2022-06-29 RX ADMIN — SODIUM CHLORIDE, POTASSIUM CHLORIDE, SODIUM LACTATE AND CALCIUM CHLORIDE 9 ML/HR: 600; 310; 30; 20 INJECTION, SOLUTION INTRAVENOUS at 10:59

## 2022-06-29 RX ADMIN — PROPOFOL 50 MG: 10 INJECTION, EMULSION INTRAVENOUS at 11:55

## 2022-06-29 RX ADMIN — PHENYLEPHRINE HYDROCHLORIDE 100 MCG: 10 INJECTION, SOLUTION INTRAVENOUS at 12:50

## 2022-06-29 RX ADMIN — PHENYLEPHRINE HYDROCHLORIDE 100 MCG: 10 INJECTION, SOLUTION INTRAVENOUS at 13:17

## 2022-06-29 RX ADMIN — AMLODIPINE BESYLATE 5 MG: 5 TABLET ORAL at 08:12

## 2022-06-29 RX ADMIN — PHENYLEPHRINE HYDROCHLORIDE 100 MCG: 10 INJECTION, SOLUTION INTRAVENOUS at 13:38

## 2022-06-29 RX ADMIN — SODIUM CHLORIDE 100 ML/HR: 9 INJECTION, SOLUTION INTRAVENOUS at 18:32

## 2022-06-29 RX ADMIN — EPHEDRINE SULFATE 5 MG: 50 INJECTION INTRAVENOUS at 13:56

## 2022-06-29 RX ADMIN — PHENYLEPHRINE HYDROCHLORIDE 100 MCG: 10 INJECTION, SOLUTION INTRAVENOUS at 12:37

## 2022-06-29 RX ADMIN — KETOROLAC TROMETHAMINE 15 MG: 15 INJECTION, SOLUTION INTRAMUSCULAR; INTRAVENOUS at 16:13

## 2022-06-29 RX ADMIN — PHENYLEPHRINE HYDROCHLORIDE 100 MCG: 10 INJECTION, SOLUTION INTRAVENOUS at 12:45

## 2022-06-29 RX ADMIN — PROPOFOL 20 MG: 10 INJECTION, EMULSION INTRAVENOUS at 11:57

## 2022-06-29 RX ADMIN — ONDANSETRON 4 MG: 2 INJECTION INTRAMUSCULAR; INTRAVENOUS at 20:45

## 2022-06-29 RX ADMIN — PHENYLEPHRINE HYDROCHLORIDE 100 MCG: 10 INJECTION, SOLUTION INTRAVENOUS at 13:29

## 2022-06-29 RX ADMIN — HYDROMORPHONE HYDROCHLORIDE 0.25 MG: 1 INJECTION, SOLUTION INTRAMUSCULAR; INTRAVENOUS; SUBCUTANEOUS at 20:13

## 2022-06-29 RX ADMIN — CELECOXIB 200 MG: 200 CAPSULE ORAL at 08:18

## 2022-06-29 RX ADMIN — METOPROLOL TARTRATE 25 MG: 25 TABLET, FILM COATED ORAL at 21:42

## 2022-06-29 RX ADMIN — DEXAMETHASONE SODIUM PHOSPHATE 8 MG: 10 INJECTION INTRAMUSCULAR; INTRAVENOUS at 12:04

## 2022-06-29 RX ADMIN — PHENYLEPHRINE HYDROCHLORIDE 100 MCG: 10 INJECTION, SOLUTION INTRAVENOUS at 12:15

## 2022-06-29 RX ADMIN — CEFAZOLIN SODIUM 2 G: 2 INJECTION, SOLUTION INTRAVENOUS at 11:34

## 2022-06-29 RX ADMIN — ONDANSETRON 4 MG: 2 INJECTION INTRAMUSCULAR; INTRAVENOUS at 15:35

## 2022-06-29 RX ADMIN — PANTOPRAZOLE SODIUM 40 MG: 40 TABLET, DELAYED RELEASE ORAL at 06:04

## 2022-06-29 RX ADMIN — FENTANYL CITRATE 50 MCG: 50 INJECTION INTRAMUSCULAR; INTRAVENOUS at 12:25

## 2022-06-29 RX ADMIN — MIDAZOLAM 1 MG: 1 INJECTION INTRAMUSCULAR; INTRAVENOUS at 10:56

## 2022-06-29 RX ADMIN — Medication 10 ML: at 08:13

## 2022-06-29 RX ADMIN — FENTANYL CITRATE 50 MCG: 50 INJECTION INTRAMUSCULAR; INTRAVENOUS at 11:55

## 2022-06-29 RX ADMIN — EPHEDRINE SULFATE 5 MG: 50 INJECTION INTRAVENOUS at 13:42

## 2022-06-29 RX ADMIN — FAMOTIDINE 20 MG: 10 INJECTION INTRAVENOUS at 10:59

## 2022-06-29 RX ADMIN — GABAPENTIN 200 MG: 100 CAPSULE ORAL at 16:13

## 2022-06-29 RX ADMIN — SODIUM CHLORIDE, POTASSIUM CHLORIDE, SODIUM LACTATE AND CALCIUM CHLORIDE: 600; 310; 30; 20 INJECTION, SOLUTION INTRAVENOUS at 11:40

## 2022-06-29 RX ADMIN — HEPARIN SODIUM 16 UNITS/KG/HR: 10000 INJECTION, SOLUTION INTRAVENOUS at 00:57

## 2022-06-29 RX ADMIN — FENTANYL CITRATE 50 MCG: 50 INJECTION INTRAMUSCULAR; INTRAVENOUS at 10:56

## 2022-06-29 RX ADMIN — PHENYLEPHRINE HYDROCHLORIDE 100 MCG: 10 INJECTION, SOLUTION INTRAVENOUS at 13:56

## 2022-06-29 RX ADMIN — BUPIVACAINE HYDROCHLORIDE 20 ML: 2.5 INJECTION, SOLUTION EPIDURAL; INFILTRATION; INTRACAUDAL; PERINEURAL at 11:05

## 2022-06-29 RX ADMIN — PHENYLEPHRINE HYDROCHLORIDE 100 MCG: 10 INJECTION, SOLUTION INTRAVENOUS at 12:57

## 2022-06-29 RX ADMIN — KETAMINE HYDROCHLORIDE 10 MG: 10 INJECTION INTRAMUSCULAR; INTRAVENOUS at 14:21

## 2022-06-29 RX ADMIN — ROCURONIUM BROMIDE 50 MG: 50 INJECTION INTRAVENOUS at 11:55

## 2022-06-29 RX ADMIN — ACETAMINOPHEN 1000 MG: 500 TABLET ORAL at 08:12

## 2022-06-29 RX ADMIN — NEOSTIGMINE METHYLSULFATE 3.5 MG: 0.5 INJECTION INTRAVENOUS at 14:16

## 2022-06-29 RX ADMIN — METOPROLOL TARTRATE 25 MG: 25 TABLET, FILM COATED ORAL at 08:12

## 2022-06-29 NOTE — ANESTHESIA PROCEDURE NOTES
Peripheral Block      Patient reassessed immediately prior to procedure    Start time: 6/29/2022 11:00 AM  Stop time: 6/29/2022 11:05 AM  Reason for block: at surgeon's request and post-op pain management  Performed by  Anesthesiologist: Cameron Mary MD  Preanesthetic Checklist  Completed: patient identified, risks and benefits discussed, surgical consent, pre-op evaluation and timeout performed  Prep:  Sterile barriers:cap, gloves and mask  Prep: alcohol swabs and ChloraPrep  Patient monitoring: blood pressure monitoring, continuous pulse oximetry and EKG  Procedure    Sedation: yes    Guidance:ultrasound guided    ULTRASOUND INTERPRETATION. Using ultrasound guidance a 21 G gauge needle was placed in close proximity to the nerve, at which point, under ultrasound guidance anesthetic was injected in the area of the nerve and spread of the anesthesia was seen on ultrasound in close proximity thereto.  There were no abnormalities seen on ultrasound; a digital image was taken; and the patient tolerated the procedure with no complications. Images:still images obtained, printed/placed on chart    Laterality:right  Block Type:erector spinae block  Injection Technique:single-shot  Needle Type:echogenic  Needle Gauge:21 G      Medications Used: bupivacaine PF (MARCAINE) 0.25 % injection, 20 mL  bupivacaine liposome (EXPAREL) 1.3 % injection, 10 mL  Med administered at 6/29/2022 11:05 AM      Post Assessment  Injection Assessment: negative aspiration for heme, no paresthesia on injection and incremental injection  Patient Tolerance:comfortable throughout block  Complications:no  Additional Notes  ULTRASOUND INTERPRETATION. Using ultrasound guidance theneedle was placed in close proximity to the nerve, at which point, under ultrasound guidance, local anesthetic was injected around but not in the nerve and spread of the anesthesia was seen on ultrasound in close proximity thereto.  There were no abnormalities seen on  ultrasound; a digital image was taken; and the patient tolerated the procedure with no complications.

## 2022-06-29 NOTE — ANESTHESIA PROCEDURE NOTES
Airway  Urgency: elective    Date/Time: 6/29/2022 11:59 AM  Airway not difficult    General Information and Staff    Patient location during procedure: OR  Anesthesiologist: Geena Jiménez MD    Indications and Patient Condition  Indications for airway management: airway protection    Preoxygenated: yes  MILS maintained throughout  Mask difficulty assessment: 2 - vent by mask + OA or adjuvant +/- NMBA    Final Airway Details  Final airway type: endotracheal airway      Successful airway: ETT and EBT - double lumen left  Cuffed: yes   Successful intubation technique: direct laryngoscopy  Endotracheal tube insertion site: oral  Blade: Stephanie  Blade size: 3  EBT DL size (fr): 35  Cormack-Lehane Classification: grade I - full view of glottis  Placement verified by: chest auscultation and capnometry   Measured from: teeth  Number of attempts at approach: 1  Assessment: lips, teeth, and gum same as pre-op and atraumatic intubation

## 2022-06-29 NOTE — ANESTHESIA PREPROCEDURE EVALUATION
Anesthesia Evaluation     NPO Solid Status: > 8 hours             Airway   Mallampati: II  TM distance: >3 FB  Neck ROM: full  Dental    (+) upper dentures and poor dentition    Pulmonary - normal exam   (+) a smoker Current, COPD,   Cardiovascular - normal exam    (+) hypertension, dysrhythmias (converted to sinus bradycardia, heparin drip stopped at 0600) Paroxysmal Atrial Fib,       Neuro/Psych  (+) psychiatric history Depression,    GI/Hepatic/Renal/Endo    (+)  GERD,      Musculoskeletal     Abdominal    Substance History      OB/GYN          Other   arthritis,                      Anesthesia Plan    ASA 3     general     (Arterial line, erector spinae block for postop pain management.    I have reviewed the patient's history with the patient and the chart, including all pertinent laboratory results and imaging. I have explained the risks of anesthesia including but not limited to dental damage, sore throat, nausea, corneal abrasion, nerve injury, MI, stroke, and death.  )  intravenous induction     Anesthetic plan, risks, benefits, and alternatives have been provided, discussed and informed consent has been obtained with: patient.        CODE STATUS:    Code Status (Patient has no pulse and is not breathing): CPR (Attempt to Resuscitate)  Medical Interventions (Patient has pulse or is breathing): Full Support

## 2022-06-29 NOTE — ANESTHESIA POSTPROCEDURE EVALUATION
"Patient: Darling Morgan    Procedure Summary     Date: 06/29/22 Room / Location: [facility] OR  / [facility] MAIN OR    Anesthesia Start: 1140 Anesthesia Stop: 1443    Procedure: BRONCHOSCOPY, RIGHT VIDEO ASSISTED THORACOSCOPY WITH DAVINCI ROBOT WITH RIGHT LOWER LOBE WEDGE RESECTION x2 WITH INTERCOSTAL NERVE BLOCKS AND MEDIASTINAL LYMPHADENECTOMY (Right Chest) Diagnosis:       Lung nodule      (Lung nodule [R91.1])    Surgeons: Bharathi Quinones III, MD Provider: Geena Jiménez MD    Anesthesia Type: general ASA Status: 3          Anesthesia Type: general    Vitals  Vitals Value Taken Time   BP 97/56 06/29/22 1646   Temp 36.4 °C (97.5 °F) 06/29/22 1630   Pulse 60 06/29/22 1734   Resp 16 06/29/22 1700   SpO2 91 % 06/29/22 1734   Vitals shown include unvalidated device data.        Post Anesthesia Care and Evaluation    Patient location during evaluation: bedside  Patient participation: complete - patient participated  Level of consciousness: awake  Pain management: adequate    Airway patency: patent  Anesthetic complications: No anesthetic complications    Cardiovascular status: acceptable  Respiratory status: acceptable  Hydration status: acceptable    Comments: BP 99/60   Pulse (!) 49   Temp 36.4 °C (97.5 °F) (Oral)   Resp 16   Ht 170.2 cm (67.01\")   Wt 55.9 kg (123 lb 4.8 oz)   SpO2 94%   BMI 19.31 kg/m²       "

## 2022-06-29 NOTE — ANESTHESIA PROCEDURE NOTES
Arterial Line      Start time: 6/29/2022 11:06 AM  Stop Time:6/29/2022 11:10 AM       Performed By   Anesthesiologist: Cameron Mary MD  Preanesthetic Checklist  Completed: patient identified, risks and benefits discussed, surgical consent, pre-op evaluation and timeout performed  Arterial Line Prep   Sterile Tech: cap, gloves and mask  Prep: ChloraPrep  Patient monitoring: blood pressure monitoring, continuous pulse oximetry and EKG  Arterial Line Procedure   Laterality:right  Location:  radial artery  Catheter size: 20 G   Guidance: ultrasound guided  PROCEDURE NOTE/ULTRASOUND INTERPRETATION.  Using ultrasound guidance the potential vascular sites for insertion of the catheter were visualized to determine the patency of the vessel to be used for vascular access.  After selecting the appropriate site for insertion, the needle was visualized under ultrasound being inserted into the radial artery, followed by ultrasound confirmation of wire and catheter placement. There were no abnormalities seen on ultrasound; an image was taken; and the patient tolerated the procedure with no complications.   Number of attempts: 1  Successful placement: yes  Post Assessment   Dressing Type: occlusive dressing applied and secured with tape.   Complications no  Patient Tolerance: patient tolerated the procedure well with no apparent complications  Additional Notes  Ultrasound Interpretation:  Using ultrasound guidance the potential vascular sites for insertion of the catheter were visualized to determine the patency of the vessel to be used for vascular access.  After selecting the appropriate site for insertion, the needle was visualized under ultrasound being inserted into the vessel, followed by ultrasound confirmation of wire and catheter placement.  There were no abnormalities seen on ultrasound; an image was taken/ and the patient tolerated the procedure with no complications.

## 2022-06-30 ENCOUNTER — APPOINTMENT (OUTPATIENT)
Dept: GENERAL RADIOLOGY | Facility: HOSPITAL | Age: 72
End: 2022-06-30

## 2022-06-30 ENCOUNTER — APPOINTMENT (OUTPATIENT)
Dept: RESPIRATORY THERAPY | Facility: HOSPITAL | Age: 72
End: 2022-06-30

## 2022-06-30 LAB
ANION GAP SERPL CALCULATED.3IONS-SCNC: 10.3 MMOL/L (ref 5–15)
APTT PPP: 27 SECONDS (ref 22.7–35.4)
APTT PPP: 50.3 SECONDS (ref 22.7–35.4)
BASOPHILS # BLD AUTO: 0.02 10*3/MM3 (ref 0–0.2)
BASOPHILS NFR BLD AUTO: 0.2 % (ref 0–1.5)
BUN SERPL-MCNC: 15 MG/DL (ref 8–23)
BUN/CREAT SERPL: 20.5 (ref 7–25)
CALCIUM SPEC-SCNC: 8.4 MG/DL (ref 8.6–10.5)
CHLORIDE SERPL-SCNC: 104 MMOL/L (ref 98–107)
CO2 SERPL-SCNC: 21.7 MMOL/L (ref 22–29)
CREAT SERPL-MCNC: 0.73 MG/DL (ref 0.57–1)
DEPRECATED RDW RBC AUTO: 44.8 FL (ref 37–54)
EGFRCR SERPLBLD CKD-EPI 2021: 88.6 ML/MIN/1.73
EOSINOPHIL # BLD AUTO: 0.01 10*3/MM3 (ref 0–0.4)
EOSINOPHIL NFR BLD AUTO: 0.1 % (ref 0.3–6.2)
ERYTHROCYTE [DISTWIDTH] IN BLOOD BY AUTOMATED COUNT: 13.2 % (ref 12.3–15.4)
GLUCOSE SERPL-MCNC: 116 MG/DL (ref 65–99)
HCT VFR BLD AUTO: 33.8 % (ref 34–46.6)
HGB BLD-MCNC: 10.9 G/DL (ref 12–15.9)
LYMPHOCYTES # BLD AUTO: 1.09 10*3/MM3 (ref 0.7–3.1)
LYMPHOCYTES NFR BLD AUTO: 11.2 % (ref 19.6–45.3)
MCH RBC QN AUTO: 30.1 PG (ref 26.6–33)
MCHC RBC AUTO-ENTMCNC: 32.2 G/DL (ref 31.5–35.7)
MCV RBC AUTO: 93.4 FL (ref 79–97)
MONOCYTES # BLD AUTO: 0.69 10*3/MM3 (ref 0.1–0.9)
MONOCYTES NFR BLD AUTO: 7.1 % (ref 5–12)
NEUTROPHILS NFR BLD AUTO: 7.92 10*3/MM3 (ref 1.7–7)
NEUTROPHILS NFR BLD AUTO: 81 % (ref 42.7–76)
PLATELET # BLD AUTO: 218 10*3/MM3 (ref 140–450)
PMV BLD AUTO: 10.1 FL (ref 6–12)
POTASSIUM SERPL-SCNC: 4.4 MMOL/L (ref 3.5–5.2)
RBC # BLD AUTO: 3.62 10*6/MM3 (ref 3.77–5.28)
SODIUM SERPL-SCNC: 136 MMOL/L (ref 136–145)
WBC NRBC COR # BLD: 9.77 10*3/MM3 (ref 3.4–10.8)

## 2022-06-30 PROCEDURE — 71045 X-RAY EXAM CHEST 1 VIEW: CPT

## 2022-06-30 PROCEDURE — 80048 BASIC METABOLIC PNL TOTAL CA: CPT | Performed by: THORACIC SURGERY (CARDIOTHORACIC VASCULAR SURGERY)

## 2022-06-30 PROCEDURE — 99232 SBSQ HOSP IP/OBS MODERATE 35: CPT | Performed by: NURSE PRACTITIONER

## 2022-06-30 PROCEDURE — 25010000002 HEPARIN (PORCINE) PER 1000 UNITS: Performed by: THORACIC SURGERY (CARDIOTHORACIC VASCULAR SURGERY)

## 2022-06-30 PROCEDURE — 25010000002 HEPARIN (PORCINE) 25000-0.45 UT/250ML-% SOLUTION: Performed by: NURSE PRACTITIONER

## 2022-06-30 PROCEDURE — 25010000002 HYDROMORPHONE PER 4 MG: Performed by: THORACIC SURGERY (CARDIOTHORACIC VASCULAR SURGERY)

## 2022-06-30 PROCEDURE — 25010000002 KETOROLAC TROMETHAMINE PER 15 MG: Performed by: THORACIC SURGERY (CARDIOTHORACIC VASCULAR SURGERY)

## 2022-06-30 PROCEDURE — 85730 THROMBOPLASTIN TIME PARTIAL: CPT | Performed by: NURSE PRACTITIONER

## 2022-06-30 PROCEDURE — 85025 COMPLETE CBC W/AUTO DIFF WBC: CPT | Performed by: THORACIC SURGERY (CARDIOTHORACIC VASCULAR SURGERY)

## 2022-06-30 RX ORDER — HEPARIN SODIUM 10000 [USP'U]/100ML
18 INJECTION, SOLUTION INTRAVENOUS
Status: DISCONTINUED | OUTPATIENT
Start: 2022-06-30 | End: 2022-07-03

## 2022-06-30 RX ORDER — NICOTINE 21 MG/24HR
1 PATCH, TRANSDERMAL 24 HOURS TRANSDERMAL
Status: DISCONTINUED | OUTPATIENT
Start: 2022-06-30 | End: 2022-07-04 | Stop reason: HOSPADM

## 2022-06-30 RX ADMIN — Medication 10 ML: at 20:51

## 2022-06-30 RX ADMIN — HEPARIN SODIUM 4400 UNITS: 5000 INJECTION INTRAVENOUS; SUBCUTANEOUS at 22:44

## 2022-06-30 RX ADMIN — GABAPENTIN 200 MG: 100 CAPSULE ORAL at 08:27

## 2022-06-30 RX ADMIN — SODIUM CHLORIDE 100 ML/HR: 9 INJECTION, SOLUTION INTRAVENOUS at 03:27

## 2022-06-30 RX ADMIN — GABAPENTIN 200 MG: 100 CAPSULE ORAL at 15:51

## 2022-06-30 RX ADMIN — PANTOPRAZOLE SODIUM 40 MG: 40 TABLET, DELAYED RELEASE ORAL at 06:15

## 2022-06-30 RX ADMIN — ZOLPIDEM TARTRATE 5 MG: 5 TABLET ORAL at 22:13

## 2022-06-30 RX ADMIN — Medication 10 ML: at 08:31

## 2022-06-30 RX ADMIN — METOPROLOL TARTRATE 25 MG: 25 TABLET, FILM COATED ORAL at 08:30

## 2022-06-30 RX ADMIN — ASPIRIN 81 MG: 81 TABLET, CHEWABLE ORAL at 08:27

## 2022-06-30 RX ADMIN — AMLODIPINE BESYLATE 5 MG: 5 TABLET ORAL at 08:27

## 2022-06-30 RX ADMIN — ATORVASTATIN CALCIUM 40 MG: 20 TABLET, FILM COATED ORAL at 20:48

## 2022-06-30 RX ADMIN — HEPARIN SODIUM 18 UNITS/KG/HR: 10000 INJECTION, SOLUTION INTRAVENOUS at 13:28

## 2022-06-30 RX ADMIN — METOPROLOL TARTRATE 25 MG: 25 TABLET, FILM COATED ORAL at 20:48

## 2022-06-30 RX ADMIN — KETOROLAC TROMETHAMINE 15 MG: 15 INJECTION, SOLUTION INTRAMUSCULAR; INTRAVENOUS at 09:55

## 2022-06-30 RX ADMIN — HEPARIN SODIUM 22 UNITS/KG/HR: 10000 INJECTION, SOLUTION INTRAVENOUS at 22:32

## 2022-06-30 RX ADMIN — ACETAMINOPHEN 1000 MG: 500 TABLET ORAL at 20:48

## 2022-06-30 RX ADMIN — HYDROMORPHONE HYDROCHLORIDE 0.5 MG: 1 INJECTION, SOLUTION INTRAMUSCULAR; INTRAVENOUS; SUBCUTANEOUS at 20:48

## 2022-06-30 RX ADMIN — ACETAMINOPHEN 1000 MG: 500 TABLET ORAL at 15:51

## 2022-06-30 RX ADMIN — KETOROLAC TROMETHAMINE 15 MG: 15 INJECTION, SOLUTION INTRAMUSCULAR; INTRAVENOUS at 15:51

## 2022-06-30 RX ADMIN — KETOROLAC TROMETHAMINE 15 MG: 15 INJECTION, SOLUTION INTRAMUSCULAR; INTRAVENOUS at 03:32

## 2022-06-30 RX ADMIN — DOCUSATE SODIUM 100 MG: 100 CAPSULE, LIQUID FILLED ORAL at 08:27

## 2022-06-30 RX ADMIN — OXYCODONE 5 MG: 5 TABLET ORAL at 06:15

## 2022-06-30 RX ADMIN — NICOTINE 1 PATCH: 21 PATCH, EXTENDED RELEASE TRANSDERMAL at 13:30

## 2022-06-30 RX ADMIN — ACETAMINOPHEN 1000 MG: 500 TABLET ORAL at 08:27

## 2022-06-30 RX ADMIN — GABAPENTIN 200 MG: 100 CAPSULE ORAL at 00:07

## 2022-06-30 RX ADMIN — GABAPENTIN 200 MG: 100 CAPSULE ORAL at 22:13

## 2022-07-01 ENCOUNTER — APPOINTMENT (OUTPATIENT)
Dept: GENERAL RADIOLOGY | Facility: HOSPITAL | Age: 72
End: 2022-07-01

## 2022-07-01 PROBLEM — D62 ACUTE POSTHEMORRHAGIC ANEMIA: Status: ACTIVE | Noted: 2022-07-01

## 2022-07-01 LAB
APTT PPP: 144.1 SECONDS (ref 22.7–35.4)
APTT PPP: 76 SECONDS (ref 22.7–35.4)
BASOPHILS # BLD AUTO: 0.01 10*3/MM3 (ref 0–0.2)
BASOPHILS NFR BLD AUTO: 0.1 % (ref 0–1.5)
DEPRECATED RDW RBC AUTO: 41.8 FL (ref 37–54)
EOSINOPHIL # BLD AUTO: 0.09 10*3/MM3 (ref 0–0.4)
EOSINOPHIL NFR BLD AUTO: 1.2 % (ref 0.3–6.2)
ERYTHROCYTE [DISTWIDTH] IN BLOOD BY AUTOMATED COUNT: 13 % (ref 12.3–15.4)
HCT VFR BLD AUTO: 27.6 % (ref 34–46.6)
HGB BLD-MCNC: 9.7 G/DL (ref 12–15.9)
IMM GRANULOCYTES # BLD AUTO: 0.02 10*3/MM3 (ref 0–0.05)
IMM GRANULOCYTES NFR BLD AUTO: 0.3 % (ref 0–0.5)
LYMPHOCYTES # BLD AUTO: 1.15 10*3/MM3 (ref 0.7–3.1)
LYMPHOCYTES NFR BLD AUTO: 14.9 % (ref 19.6–45.3)
MCH RBC QN AUTO: 31.4 PG (ref 26.6–33)
MCHC RBC AUTO-ENTMCNC: 35.1 G/DL (ref 31.5–35.7)
MCV RBC AUTO: 89.3 FL (ref 79–97)
MONOCYTES # BLD AUTO: 0.56 10*3/MM3 (ref 0.1–0.9)
MONOCYTES NFR BLD AUTO: 7.2 % (ref 5–12)
NEUTROPHILS NFR BLD AUTO: 5.91 10*3/MM3 (ref 1.7–7)
NEUTROPHILS NFR BLD AUTO: 76.3 % (ref 42.7–76)
NRBC BLD AUTO-RTO: 0 /100 WBC (ref 0–0.2)
PLATELET # BLD AUTO: 198 10*3/MM3 (ref 140–450)
PMV BLD AUTO: 9.8 FL (ref 6–12)
QT INTERVAL: 386 MS
RBC # BLD AUTO: 3.09 10*6/MM3 (ref 3.77–5.28)
WBC NRBC COR # BLD: 7.74 10*3/MM3 (ref 3.4–10.8)

## 2022-07-01 PROCEDURE — 25010000002 HEPARIN (PORCINE) 25000-0.45 UT/250ML-% SOLUTION: Performed by: NURSE PRACTITIONER

## 2022-07-01 PROCEDURE — 71045 X-RAY EXAM CHEST 1 VIEW: CPT

## 2022-07-01 PROCEDURE — 25010000002 HYDROMORPHONE PER 4 MG: Performed by: THORACIC SURGERY (CARDIOTHORACIC VASCULAR SURGERY)

## 2022-07-01 PROCEDURE — 93010 ELECTROCARDIOGRAM REPORT: CPT | Performed by: INTERNAL MEDICINE

## 2022-07-01 PROCEDURE — 85730 THROMBOPLASTIN TIME PARTIAL: CPT | Performed by: HOSPITALIST

## 2022-07-01 PROCEDURE — 25010000002 FUROSEMIDE PER 20 MG: Performed by: NURSE PRACTITIONER

## 2022-07-01 PROCEDURE — 93005 ELECTROCARDIOGRAM TRACING: CPT | Performed by: HOSPITALIST

## 2022-07-01 PROCEDURE — 99232 SBSQ HOSP IP/OBS MODERATE 35: CPT | Performed by: NURSE PRACTITIONER

## 2022-07-01 PROCEDURE — 85025 COMPLETE CBC W/AUTO DIFF WBC: CPT | Performed by: THORACIC SURGERY (CARDIOTHORACIC VASCULAR SURGERY)

## 2022-07-01 RX ORDER — FUROSEMIDE 10 MG/ML
20 INJECTION INTRAMUSCULAR; INTRAVENOUS ONCE
Status: COMPLETED | OUTPATIENT
Start: 2022-07-01 | End: 2022-07-01

## 2022-07-01 RX ADMIN — SODIUM CHLORIDE 100 ML/HR: 9 INJECTION, SOLUTION INTRAVENOUS at 03:45

## 2022-07-01 RX ADMIN — FUROSEMIDE 20 MG: 10 INJECTION, SOLUTION INTRAMUSCULAR; INTRAVENOUS at 16:05

## 2022-07-01 RX ADMIN — ACETAMINOPHEN 1000 MG: 500 TABLET ORAL at 21:46

## 2022-07-01 RX ADMIN — BISACODYL 10 MG: 10 SUPPOSITORY RECTAL at 06:05

## 2022-07-01 RX ADMIN — DIAZEPAM 2 MG: 2 TABLET ORAL at 21:49

## 2022-07-01 RX ADMIN — ASPIRIN 81 MG: 81 TABLET, CHEWABLE ORAL at 08:05

## 2022-07-01 RX ADMIN — HYDROMORPHONE HYDROCHLORIDE 0.5 MG: 1 INJECTION, SOLUTION INTRAMUSCULAR; INTRAVENOUS; SUBCUTANEOUS at 02:27

## 2022-07-01 RX ADMIN — ACETAMINOPHEN 1000 MG: 500 TABLET ORAL at 08:05

## 2022-07-01 RX ADMIN — Medication 10 ML: at 21:49

## 2022-07-01 RX ADMIN — OXYCODONE 5 MG: 5 TABLET ORAL at 06:18

## 2022-07-01 RX ADMIN — OXYCODONE 5 MG: 5 TABLET ORAL at 19:34

## 2022-07-01 RX ADMIN — ACETAMINOPHEN 1000 MG: 500 TABLET ORAL at 16:05

## 2022-07-01 RX ADMIN — ATORVASTATIN CALCIUM 40 MG: 20 TABLET, FILM COATED ORAL at 21:46

## 2022-07-01 RX ADMIN — PANTOPRAZOLE SODIUM 40 MG: 40 TABLET, DELAYED RELEASE ORAL at 05:18

## 2022-07-01 RX ADMIN — GABAPENTIN 200 MG: 100 CAPSULE ORAL at 16:05

## 2022-07-01 RX ADMIN — OXYCODONE 5 MG: 5 TABLET ORAL at 12:17

## 2022-07-01 RX ADMIN — NICOTINE 1 PATCH: 21 PATCH, EXTENDED RELEASE TRANSDERMAL at 08:06

## 2022-07-01 RX ADMIN — HEPARIN SODIUM 19 UNITS/KG/HR: 10000 INJECTION, SOLUTION INTRAVENOUS at 12:44

## 2022-07-01 RX ADMIN — GABAPENTIN 200 MG: 100 CAPSULE ORAL at 08:05

## 2022-07-01 RX ADMIN — METOPROLOL TARTRATE 25 MG: 25 TABLET, FILM COATED ORAL at 08:05

## 2022-07-01 RX ADMIN — METOPROLOL TARTRATE 25 MG: 25 TABLET, FILM COATED ORAL at 21:46

## 2022-07-01 NOTE — PROGRESS NOTES
Name: Darling Morgan ADMIT: 2022   : 1950  PCP: Ra Keita MD    MRN: [id number] LOS: 5 days   AGE/SEX: 71 y.o. female  ROOM: [facility]     Subjective   Subjective    Pain from chest tube. She also went into atrial fibrillation earlier approximately 4 AM. Denies any symptoms from it.    Review of Systems   Constitutional: Negative for fever.   Respiratory: Negative for cough and shortness of breath.    Cardiovascular: Positive for chest pain (from chest tube).   Gastrointestinal: Negative for abdominal pain, diarrhea, nausea and vomiting.   Genitourinary: Negative for dysuria.   Neurological: Negative for headaches.      Objective   Objective   Vital Signs  Temp:  [98.1 °F (36.7 °C)-99.1 °F (37.3 °C)] 99.1 °F (37.3 °C)  Heart Rate:  [] 101  Resp:  [16] 16  BP: ()/(55-87) 109/76  SpO2:  [90 %-93 %] 92 %  on  Flow (L/min):  [1-4] 4;   Device (Oxygen Therapy): humidified;high-flow nasal cannula  Body mass index is 19.31 kg/m².    Physical Exam  Constitutional:       General: She is not in acute distress.     Appearance: Normal appearance. She is cachectic. She is ill-appearing (chronic). She is not toxic-appearing.   HENT:      Head: Normocephalic and atraumatic.   Cardiovascular:      Rate and Rhythm: Normal rate. Rhythm irregular.   Pulmonary:      Effort: Pulmonary effort is normal. No respiratory distress.      Breath sounds: No wheezing, rhonchi or rales.      Comments: Right chest tube  Abdominal:      General: Bowel sounds are normal.      Palpations: Abdomen is soft.      Tenderness: There is no abdominal tenderness. There is no guarding or rebound.   Musculoskeletal:         General: No swelling.      Right lower leg: No edema.      Left lower leg: No edema.   Skin:     General: Skin is warm and dry.   Neurological:      General: No focal deficit present.      Mental Status: She is alert and oriented to person, place, and time.   Psychiatric:         Mood and Affect: Mood normal.          Behavior: Behavior normal.         Thought Content: Thought content normal.     Results Review  I reviewed the patient's new clinical results.  Results from last 7 days   Lab Units 07/01/22  0523 06/30/22  0620 06/29/22  0552 06/28/22  0520   WBC 10*3/mm3 7.74 9.77 5.92 5.03   HEMOGLOBIN g/dL 9.7* 10.9* 12.0 12.3   PLATELETS 10*3/mm3 198 218 278 275     Results from last 7 days   Lab Units 06/30/22  0620 06/29/22  0552 06/25/22  0541   SODIUM mmol/L 136 141 142   POTASSIUM mmol/L 4.4 3.8 3.9   CHLORIDE mmol/L 104 105 108*   CO2 mmol/L 21.7* 26.6 24.8   BUN mg/dL 15 15 10   CREATININE mg/dL 0.73 0.73 0.85   GLUCOSE mg/dL 116* 83 110*     Lab Results   Component Value Date    ANIONGAP 10.3 06/30/2022     Estimated Creatinine Clearance: 63.3 mL/min (by C-G formula based on SCr of 0.73 mg/dL).    Results from last 7 days   Lab Units 06/25/22  0541   ALBUMIN g/dL 3.60   BILIRUBIN mg/dL 0.2   ALK PHOS U/L 76   AST (SGOT) U/L 18   ALT (SGPT) U/L 17     Results from last 7 days   Lab Units 06/30/22  0620 06/29/22  0552 06/25/22  0541   CALCIUM mg/dL 8.4* 9.2 8.9   ALBUMIN g/dL  --   --  3.60         Scheduled Meds  acetaminophen, 1,000 mg, Oral, TID  amLODIPine, 5 mg, Oral, Q24H  aspirin, 81 mg, Oral, Daily  atorvastatin, 40 mg, Oral, Nightly  docusate sodium, 100 mg, Oral, Daily  gabapentin, 200 mg, Oral, Q8H  metoprolol tartrate, 25 mg, Oral, Q12H  nicotine, 1 patch, Transdermal, Q24H  pantoprazole, 40 mg, Oral, Q AM  polyethylene glycol, 17 g, Oral, Daily  sodium chloride, 10 mL, Intravenous, Q12H    Continuous Infusions  heparin, 18 Units/kg/hr, Last Rate: 19 Units/kg/hr (07/01/22 0717)  lactated ringers, 9 mL/hr, Last Rate: Stopped (06/29/22 1824)  Pharmacy Consult - Pharmacy to dose,   sodium chloride, 100 mL/hr, Last Rate: 100 mL/hr (07/01/22 7755)    PRN Meds  •  acetaminophen **OR** acetaminophen **OR** acetaminophen  •  bisacodyl  •  diazePAM  •  heparin (porcine)  •  hydrALAZINE  •  HYDROmorphone  •   magnesium hydroxide  •  nitroglycerin  •  ondansetron  •  ondansetron **OR** ondansetron  •  oxyCODONE  •  Pharmacy Consult - Pharmacy to dose  •  sodium chloride  •  zolpidem    heparin, 18 Units/kg/hr, Last Rate: 19 Units/kg/hr (07/01/22 0717)  lactated ringers, 9 mL/hr, Last Rate: Stopped (06/29/22 1830)  Pharmacy Consult - Pharmacy to dose,   sodium chloride, 100 mL/hr, Last Rate: 100 mL/hr (07/01/22 0345)    Diet  Diet Regular; Consistent Carbohydrate, Cardiac    I have personally reviewed:  [x]  Laboratory   []  Microbiology   [x]  Radiology   [x]  EKG/Telemetry   []  Cardiology/Vascular   []  Pathology   []  Records     Assessment/Plan     Active Hospital Problems    Diagnosis  POA   • **Lung nodule [R91.1]  Yes   • Acute posthemorrhagic anemia [D62]  Unknown   • Follow-up exam [Z09]  Not Applicable   • Paroxysmal atrial fibrillation with rapid ventricular response (HCC) [I48.0]  Unknown   • Stroke-like symptoms [R29.90]  Yes   • Emphysema lung (HCC) [J43.9]  Unknown   • GERD (gastroesophageal reflux disease) [K21.9]  Unknown   • History of COVID-19 [Z86.16]  Unknown   • Hypertension [I10]  Unknown   • Cerebral meningioma (HCC) [D32.0]  Yes      Resolved Hospital Problems   No resolved problems to display.     71 y.o. female scheduled for wedge resection right lower lobe lung nodule was in A. fib RVR and holding and admitted.    Lung nodule: Status post thoracoscopy and wedge resection 6/29/2022. Postop care per thoracic surgery.     Postop blood loss anemia expected. Continue to monitor.    New atrial fibrillation with RVR: Back in atrial fibrillation. Heparin drip. Eliquis when okay with thoracic surgery    Hypertension: Controlled    Meningioma seen on MRI brain.    COPD    Discussed with patient and nursing staff     Discharge: To be determined    Alberto Hernández MD  [facility]  07/01/22

## 2022-07-01 NOTE — PROGRESS NOTES
Continued Stay Note  [facility]     Patient Name: Darling Morgan  MRN: [id number]  Today's Date: 7/1/2022    Admit Date: 6/24/2022     Discharge Plan     Row Name 07/01/22 1248       Plan    Plan Home with family    Patient/Family in Agreement with Plan yes    Plan Comments Pt's plan remains home with  at d/c. CCP to follow for needs pending clinical course. Gilda Beasley LCSW               Discharge Codes    No documentation.               Expected Discharge Date and Time     Expected Discharge Date Expected Discharge Time    Jul 5, 2022             Milvia Beasley LCSW

## 2022-07-01 NOTE — PLAN OF CARE
Goal Outcome Evaluation:           Progress: no change  Outcome Evaluation: Soft BP this morning.4L HFNC. Heparin gtt titrated. Up with standby assist to bathroom. Had suppository this morning because pt stated she had been constipated for 4 days.  Has had 2 BMs this morning. Pt has also went back into afib as of 0430 this morning. Will continue to monitor.

## 2022-07-01 NOTE — PROGRESS NOTES
"  POST-OPERATIVE NOTE     Chief Complaint: Lung nodule  S/P: Bronchoscopy, right video-assisted thoracoscopy with da Stephenie robot with right lower lobe wedge resection x2 with intercostal nerve blocks and mediastinal lymphadenectomy.  POD # 2    Subjective:  Symptoms:  Stable.  She reports cough and chest pain (tenderness around chest tube.).  No shortness of breath.    Diet:  Adequate intake.    Activity level: Impaired due to weakness.    Pain:  She complains of pain that is mild.  Pain is well controlled.        Objective:  General Appearance:  Comfortable, in no acute distress and ill-appearing.    Vital signs: (most recent): Blood pressure 101/59, pulse 108, temperature 99.4 °F (37.4 °C), temperature source Oral, resp. rate 16, height 170.2 cm (67.01\"), weight 55.9 kg (123 lb 4.8 oz), SpO2 91 %.  Vital signs are normal.  No fever.    Output: Producing urine.    Lungs:  Normal effort.  There are decreased breath sounds.    Heart: Normal rate.  Irregular rhythm.  No murmur. (Atrial fibrillation rate controlled.)  Chest: Symmetric chest wall expansion. Chest wall tenderness (around chest) present.    Abdomen: Abdomen is soft and flat.  Bowel sounds are normal.   There is no abdominal tenderness.     Neurological: Patient is alert and oriented to person, place and time.    Skin:  Warm and dry.            Chest tube:   Site: Right, Clean, Dry, Intact and Securement device intact  Suction: -20 cm  Air Leak: Negative  24 Hour Total: 370ml     Results Review:     I reviewed the patient's new clinical results.  I reviewed the patient's new imaging results and agree with the interpretation.  I personally viewed and interpreted the patient's EKG/Telemetry data  Discussed with Patient, nurse and Dr. Conrad     Assessment & Plan      Patient continues to do well, she is postop day 2.  Final surgical pathology remains pending.  She continues to require supplemental oxygen at 2 L/min via nasal cannula.  This morning's chest " x-ray was reviewed and demonstrates bibasilar atelectasis, persistent small right pleural separation, and subcutaneous emphysema in the right chest wall.  We will try placing her chest tube to waterseal today.  Repeat chest x-ray in a.m.  Still with fairly significant output from chest tube.  One-time dose of Lasix IV push.  Continue with heparin drip until chest tube is removed.    SUKHJINDER Shay  [facility]  07/01/22  13:44 EDT    Patient was seen and assessed while wearing personal protective equipment including facemask, protective eyewear and gloves.  Hand hygiene performed prior to entering the room and upon exiting with doffing of gloves.

## 2022-07-01 NOTE — PROGRESS NOTES
HOSPITAL PROGRESS NOTE    Date of Service: 22  LOS:  LOS: 5 days   Patient Name: Darling Morgan  Age/Sex: 71 y.o. female  : 1950  MRN: [id number]  Primary Cardiologist: Dr. Ketan Fields     Subjective:     Chief Complaint/Follow up:   Atrial Fibrillation    Interval History:   Sitting in chair.  Reports shortness of breath.  She is asymptomatic with atrial fibrillation.  She has had chronic dizziness for 7 years.  She was experiencing leg pain outpatient and this has resolved.    Objective:     Objective:  Temp:  [98.1 °F (36.7 °C)-99.1 °F (37.3 °C)] 98.8 °F (37.1 °C)  Heart Rate:  [] 96  Resp:  [16] 16  BP: ()/(55-76) 92/58  Body mass index is 19.31 kg/m².    Intake/Output Summary (Last 24 hours) at 2022 1224  Last data filed at 2022 0200  Gross per 24 hour   Intake 480 ml   Output 370 ml   Net 110 ml         22  1454 22  0926 22  0500   Weight: 54.9 kg (121 lb) 54.9 kg (121 lb 0.5 oz) 55.9 kg (123 lb 4.8 oz)     Weight change:     Physical Exam:   General Appearance: Alert, cooperative, in no acute distress. AAOx4.   HEENT: Normocephalic.  Neck: Supple. No JVD. No carotid bruit. No thyromegaly  Lungs: Diminished lung sounds.  On oxygen.  Chest tube.  Heart: Irregularly, irregular.  Normal S1 and S2, no murmurs, gallops or rubs.  Abdomen: Soft, nontender, nondistended.   Extremities: Warm, no cyanosis, or clubbing. No edema.     Lab Review:   Results from last 7 days   Lab Units 22  0620 22  0552 22  0541   SODIUM mmol/L 136 141 142   POTASSIUM mmol/L 4.4 3.8 3.9   CHLORIDE mmol/L 104 105 108*   CO2 mmol/L 21.7* 26.6 24.8   BUN mg/dL 15 15 10   CREATININE mg/dL 0.73 0.73 0.85   GLUCOSE mg/dL 116* 83 110*   CALCIUM mg/dL 8.4* 9.2 8.9   AST (SGOT) U/L  --   --  18   ALT (SGPT) U/L  --   --  17     Results from last 7 days   Lab Units 22  0541   TROPONIN T ng/mL <0.010     Results from last 7 days   Lab Units 22  0523  06/30/22  0620   WBC 10*3/mm3 7.74 9.77   HEMOGLOBIN g/dL 9.7* 10.9*   HEMATOCRIT % 27.6* 33.8*   PLATELETS 10*3/mm3 198 218     Results from last 7 days   Lab Units 07/01/22  0523 06/30/22  1926 06/30/22  1247 06/29/22  0552 06/26/22  0018 06/25/22  1721   INR   --   --   --  0.98  --  1.04   APTT seconds 144.1* 50.3*   < > 66.8*   < > 26.8    < > = values in this interval not displayed.                 Results from last 7 days   Lab Units 06/25/22  0541   TSH uIU/mL 3.830       Results for orders placed during the hospital encounter of 06/24/22    Adult Transthoracic Echo Complete W/ Cont if Necessary Per Protocol    Interpretation Summary  · The left ventricular cavity is mildly dilated.  · Left ventricular ejection fraction appears to be 56 - 60%. Left ventricular systolic function is normal.  · Left ventricular diastolic function is consistent with (grade II w/high LAP) pseudonormalization.  · Normal right ventricular cavity size and systolic function noted.  · The left atrial cavity is mild to moderately dilated.  · Cannot rule out bicuspid aortic valve. The aortic valve is mildly calcified  · Moderate mitral valve regurgitation is present.  · Mild tricuspid valve regurgitation is present.  · Calculated right ventricular systolic pressure from tricuspid regurgitation is 41 mmHg.  · There is no evidence of pericardial effusion    I reviewed the patient's new clinical results.  I personally viewed and interpreted the patient's EKG/Telemetry data/Labs/Test Results.     Current Medications:   Scheduled Meds:acetaminophen, 1,000 mg, Oral, TID  amLODIPine, 5 mg, Oral, Q24H  aspirin, 81 mg, Oral, Daily  atorvastatin, 40 mg, Oral, Nightly  docusate sodium, 100 mg, Oral, Daily  gabapentin, 200 mg, Oral, Q8H  metoprolol tartrate, 25 mg, Oral, Q12H  nicotine, 1 patch, Transdermal, Q24H  pantoprazole, 40 mg, Oral, Q AM  polyethylene glycol, 17 g, Oral, Daily  sodium chloride, 10 mL, Intravenous, Q12H      Continuous  Infusions:heparin, 18 Units/kg/hr, Last Rate: 19 Units/kg/hr (07/01/22 0717)  lactated ringers, 9 mL/hr, Last Rate: Stopped (06/29/22 1830)  sodium chloride, 100 mL/hr, Last Rate: 100 mL/hr (07/01/22 7675)        Allergies:  No Known Allergies    Assessment:       Lung nodule    Cerebral meningioma (HCC)    Paroxysmal atrial fibrillation with rapid ventricular response (HCC)    Emphysema lung (HCC)    GERD (gastroesophageal reflux disease)    History of COVID-19    Hypertension    Stroke-like symptoms    Follow-up exam    Acute posthemorrhagic anemia        Plan:   Assessment & Plan       1.  Paroxysmal Atrial Fibrillation: Heart rate on the upper limits of normal and I cannot titrate metoprolol due to low blood pressure. HHMEx1Ctlb score of 4 - recommend apixiban when ok with CT surgery.   2.  PVCs noted on telemetry.  Asymptomatic.  3.  Grade 2 diastolic dysfunction noted on echocardiogram.  4.  Moderate mitral valve regurgitation and mild tricuspid regurgitation.  5.  Hypertension-blood pressure low today.  6.  Right lower lobe lung nodule-s/p robot-assisted wedge resection x2, mediastinal lymphadenectomy and intercostal nerve blocks by Dr. Quinones 6/29. Pathology pending   7.  Anemia.  8.  Cardiology will continue to follow along.    SUKHJINDER Molina  [facility]   07/01/22  12:24 EDT

## 2022-07-02 ENCOUNTER — APPOINTMENT (OUTPATIENT)
Dept: GENERAL RADIOLOGY | Facility: HOSPITAL | Age: 72
End: 2022-07-02

## 2022-07-02 PROBLEM — E87.6 HYPOPOTASSEMIA: Status: ACTIVE | Noted: 2022-07-02

## 2022-07-02 LAB
ANION GAP SERPL CALCULATED.3IONS-SCNC: 10 MMOL/L (ref 5–15)
APTT PPP: 88.2 SECONDS (ref 22.7–35.4)
BASOPHILS # BLD AUTO: 0.02 10*3/MM3 (ref 0–0.2)
BASOPHILS NFR BLD AUTO: 0.3 % (ref 0–1.5)
BUN SERPL-MCNC: 9 MG/DL (ref 8–23)
BUN/CREAT SERPL: 15.5 (ref 7–25)
CALCIUM SPEC-SCNC: 8.2 MG/DL (ref 8.6–10.5)
CHLORIDE SERPL-SCNC: 106 MMOL/L (ref 98–107)
CO2 SERPL-SCNC: 24 MMOL/L (ref 22–29)
CREAT SERPL-MCNC: 0.58 MG/DL (ref 0.57–1)
DEPRECATED RDW RBC AUTO: 43.9 FL (ref 37–54)
EGFRCR SERPLBLD CKD-EPI 2021: 97.5 ML/MIN/1.73
EOSINOPHIL # BLD AUTO: 0.15 10*3/MM3 (ref 0–0.4)
EOSINOPHIL NFR BLD AUTO: 2.2 % (ref 0.3–6.2)
ERYTHROCYTE [DISTWIDTH] IN BLOOD BY AUTOMATED COUNT: 13 % (ref 12.3–15.4)
GLUCOSE SERPL-MCNC: 89 MG/DL (ref 65–99)
HCT VFR BLD AUTO: 30 % (ref 34–46.6)
HGB BLD-MCNC: 10 G/DL (ref 12–15.9)
IMM GRANULOCYTES # BLD AUTO: 0.01 10*3/MM3 (ref 0–0.05)
IMM GRANULOCYTES NFR BLD AUTO: 0.1 % (ref 0–0.5)
LYMPHOCYTES # BLD AUTO: 2.14 10*3/MM3 (ref 0.7–3.1)
LYMPHOCYTES NFR BLD AUTO: 31 % (ref 19.6–45.3)
MAGNESIUM SERPL-MCNC: 1.8 MG/DL (ref 1.6–2.4)
MCH RBC QN AUTO: 30.9 PG (ref 26.6–33)
MCHC RBC AUTO-ENTMCNC: 33.3 G/DL (ref 31.5–35.7)
MCV RBC AUTO: 92.6 FL (ref 79–97)
MONOCYTES # BLD AUTO: 0.8 10*3/MM3 (ref 0.1–0.9)
MONOCYTES NFR BLD AUTO: 11.6 % (ref 5–12)
NEUTROPHILS NFR BLD AUTO: 3.79 10*3/MM3 (ref 1.7–7)
NEUTROPHILS NFR BLD AUTO: 54.8 % (ref 42.7–76)
NRBC BLD AUTO-RTO: 0 /100 WBC (ref 0–0.2)
PLATELET # BLD AUTO: 208 10*3/MM3 (ref 140–450)
PMV BLD AUTO: 9.6 FL (ref 6–12)
POTASSIUM SERPL-SCNC: 3.4 MMOL/L (ref 3.5–5.2)
RBC # BLD AUTO: 3.24 10*6/MM3 (ref 3.77–5.28)
SODIUM SERPL-SCNC: 140 MMOL/L (ref 136–145)
WBC NRBC COR # BLD: 6.91 10*3/MM3 (ref 3.4–10.8)

## 2022-07-02 PROCEDURE — 25010000002 MAGNESIUM SULFATE 2 GM/50ML SOLUTION: Performed by: INTERNAL MEDICINE

## 2022-07-02 PROCEDURE — 83735 ASSAY OF MAGNESIUM: CPT | Performed by: HOSPITALIST

## 2022-07-02 PROCEDURE — 85730 THROMBOPLASTIN TIME PARTIAL: CPT | Performed by: HOSPITALIST

## 2022-07-02 PROCEDURE — 80048 BASIC METABOLIC PNL TOTAL CA: CPT | Performed by: HOSPITALIST

## 2022-07-02 PROCEDURE — 25010000002 FUROSEMIDE PER 20 MG: Performed by: THORACIC SURGERY (CARDIOTHORACIC VASCULAR SURGERY)

## 2022-07-02 PROCEDURE — 99232 SBSQ HOSP IP/OBS MODERATE 35: CPT | Performed by: INTERNAL MEDICINE

## 2022-07-02 PROCEDURE — 25010000002 HEPARIN (PORCINE) 25000-0.45 UT/250ML-% SOLUTION: Performed by: NURSE PRACTITIONER

## 2022-07-02 PROCEDURE — 85025 COMPLETE CBC W/AUTO DIFF WBC: CPT | Performed by: THORACIC SURGERY (CARDIOTHORACIC VASCULAR SURGERY)

## 2022-07-02 PROCEDURE — 71045 X-RAY EXAM CHEST 1 VIEW: CPT

## 2022-07-02 PROCEDURE — 99232 SBSQ HOSP IP/OBS MODERATE 35: CPT | Performed by: THORACIC SURGERY (CARDIOTHORACIC VASCULAR SURGERY)

## 2022-07-02 RX ORDER — MAGNESIUM SULFATE HEPTAHYDRATE 40 MG/ML
2 INJECTION, SOLUTION INTRAVENOUS ONCE
Status: COMPLETED | OUTPATIENT
Start: 2022-07-02 | End: 2022-07-02

## 2022-07-02 RX ORDER — POTASSIUM CHLORIDE 1.5 G/1.77G
40 POWDER, FOR SOLUTION ORAL AS NEEDED
Status: DISCONTINUED | OUTPATIENT
Start: 2022-07-02 | End: 2022-07-04 | Stop reason: HOSPADM

## 2022-07-02 RX ORDER — FUROSEMIDE 10 MG/ML
20 INJECTION INTRAMUSCULAR; INTRAVENOUS ONCE
Status: COMPLETED | OUTPATIENT
Start: 2022-07-02 | End: 2022-07-02

## 2022-07-02 RX ORDER — POTASSIUM CHLORIDE 7.45 MG/ML
10 INJECTION INTRAVENOUS
Status: DISCONTINUED | OUTPATIENT
Start: 2022-07-02 | End: 2022-07-04 | Stop reason: HOSPADM

## 2022-07-02 RX ORDER — METOPROLOL TARTRATE 50 MG/1
50 TABLET, FILM COATED ORAL EVERY 12 HOURS SCHEDULED
Status: DISCONTINUED | OUTPATIENT
Start: 2022-07-02 | End: 2022-07-04 | Stop reason: HOSPADM

## 2022-07-02 RX ORDER — POTASSIUM CHLORIDE 750 MG/1
40 TABLET, FILM COATED, EXTENDED RELEASE ORAL AS NEEDED
Status: DISCONTINUED | OUTPATIENT
Start: 2022-07-02 | End: 2022-07-04 | Stop reason: HOSPADM

## 2022-07-02 RX ORDER — AMLODIPINE BESYLATE 2.5 MG/1
2.5 TABLET ORAL
Status: DISCONTINUED | OUTPATIENT
Start: 2022-07-03 | End: 2022-07-04 | Stop reason: HOSPADM

## 2022-07-02 RX ORDER — POTASSIUM CHLORIDE 750 MG/1
20 TABLET, FILM COATED, EXTENDED RELEASE ORAL DAILY
Status: DISCONTINUED | OUTPATIENT
Start: 2022-07-02 | End: 2022-07-04 | Stop reason: HOSPADM

## 2022-07-02 RX ADMIN — ACETAMINOPHEN 1000 MG: 500 TABLET ORAL at 15:05

## 2022-07-02 RX ADMIN — GABAPENTIN 200 MG: 100 CAPSULE ORAL at 08:29

## 2022-07-02 RX ADMIN — GABAPENTIN 200 MG: 100 CAPSULE ORAL at 00:09

## 2022-07-02 RX ADMIN — OXYCODONE 5 MG: 5 TABLET ORAL at 18:10

## 2022-07-02 RX ADMIN — OXYCODONE 5 MG: 5 TABLET ORAL at 00:09

## 2022-07-02 RX ADMIN — POTASSIUM CHLORIDE 20 MEQ: 750 TABLET, EXTENDED RELEASE ORAL at 15:05

## 2022-07-02 RX ADMIN — ASPIRIN 81 MG: 81 TABLET, CHEWABLE ORAL at 08:27

## 2022-07-02 RX ADMIN — NICOTINE 1 PATCH: 21 PATCH, EXTENDED RELEASE TRANSDERMAL at 08:30

## 2022-07-02 RX ADMIN — ACETAMINOPHEN 1000 MG: 500 TABLET ORAL at 21:09

## 2022-07-02 RX ADMIN — OXYCODONE 5 MG: 5 TABLET ORAL at 22:15

## 2022-07-02 RX ADMIN — OXYCODONE 5 MG: 5 TABLET ORAL at 10:21

## 2022-07-02 RX ADMIN — GABAPENTIN 200 MG: 100 CAPSULE ORAL at 15:05

## 2022-07-02 RX ADMIN — OXYCODONE 5 MG: 5 TABLET ORAL at 05:48

## 2022-07-02 RX ADMIN — FUROSEMIDE 20 MG: 10 INJECTION, SOLUTION INTRAMUSCULAR; INTRAVENOUS at 12:35

## 2022-07-02 RX ADMIN — ATORVASTATIN CALCIUM 40 MG: 20 TABLET, FILM COATED ORAL at 21:09

## 2022-07-02 RX ADMIN — NYSTATIN 500000 UNITS: 100000 SUSPENSION ORAL at 21:09

## 2022-07-02 RX ADMIN — METOPROLOL TARTRATE 50 MG: 50 TABLET, FILM COATED ORAL at 21:08

## 2022-07-02 RX ADMIN — POTASSIUM CHLORIDE 40 MEQ: 750 TABLET, EXTENDED RELEASE ORAL at 12:36

## 2022-07-02 RX ADMIN — PANTOPRAZOLE SODIUM 40 MG: 40 TABLET, DELAYED RELEASE ORAL at 05:48

## 2022-07-02 RX ADMIN — METOPROLOL TARTRATE 25 MG: 25 TABLET, FILM COATED ORAL at 08:28

## 2022-07-02 RX ADMIN — OXYCODONE 5 MG: 5 TABLET ORAL at 14:38

## 2022-07-02 RX ADMIN — ACETAMINOPHEN 1000 MG: 500 TABLET ORAL at 08:29

## 2022-07-02 RX ADMIN — NYSTATIN 500000 UNITS: 100000 SUSPENSION ORAL at 17:01

## 2022-07-02 RX ADMIN — HEPARIN SODIUM 19 UNITS/KG/HR: 10000 INJECTION, SOLUTION INTRAVENOUS at 09:37

## 2022-07-02 RX ADMIN — MAGNESIUM SULFATE HEPTAHYDRATE 2 G: 2 INJECTION, SOLUTION INTRAVENOUS at 15:05

## 2022-07-02 NOTE — PLAN OF CARE
Goal Outcome Evaluation:  Plan of Care Reviewed With: patient        Progress: no change  Outcome Evaluation: Monitor pain,labs,and vitals. Pain controlled with PRN medications per orders. O2 2L NC. Patient is in and out of AFIB. Will continue to. monitor

## 2022-07-02 NOTE — PROGRESS NOTES
LOS: 6 days   Patient Care Team:  Ra Keita MD as PCP - General (Family Medicine)    Chief Complaint:     F/u atrial fibrillation    Interval History:     She has no chest pain or pressure.  She has no difficulty breathing.  She does not feel her heart racing or skipping.  Her legs feel better since being on metoprolol.    Objective   Vital Signs  Temp:  [98.1 °F (36.7 °C)-99.4 °F (37.4 °C)] 98.5 °F (36.9 °C)  Heart Rate:  [108] 108  Resp:  [16] 16  BP: ()/(53-76) 91/54    Intake/Output Summary (Last 24 hours) at 7/2/2022 1408  Last data filed at 7/2/2022 0800  Gross per 24 hour   Intake 1080 ml   Output 375 ml   Net 705 ml       Comfortable NAD  Neck supple, no JVD or thyromegaly appreciated  S1/S2 irregular, rate controlled no m/r/g  Lungs CTA B, normal effort  Abdomen S/NT/ND (+) BS, no HSM appreciated  Extremities warm, no clubbing, cyanosis, or edema  No visible or palpable skin lesions  A/Ox4, mood and affect appropriate    Results Review:      Results from last 7 days   Lab Units 07/02/22  0639 06/30/22  0620 06/29/22  0552   SODIUM mmol/L 140 136 141   POTASSIUM mmol/L 3.4* 4.4 3.8   CHLORIDE mmol/L 106 104 105   CO2 mmol/L 24.0 21.7* 26.6   BUN mg/dL 9 15 15   CREATININE mg/dL 0.58 0.73 0.73   GLUCOSE mg/dL 89 116* 83   CALCIUM mg/dL 8.2* 8.4* 9.2         Results from last 7 days   Lab Units 07/02/22  0639 07/01/22  0523 06/30/22  0620   WBC 10*3/mm3 6.91 7.74 9.77   HEMOGLOBIN g/dL 10.0* 9.7* 10.9*   HEMATOCRIT % 30.0* 27.6* 33.8*   PLATELETS 10*3/mm3 208 198 218     Results from last 7 days   Lab Units 07/02/22  0839 07/01/22  1317 07/01/22  0523 06/30/22  1247 06/29/22  0552 06/26/22  0018 06/25/22  1721   INR   --   --   --   --  0.98  --  1.04   APTT seconds 88.2* 76.0* 144.1*   < > 66.8*   < > 26.8    < > = values in this interval not displayed.         Results from last 7 days   Lab Units 07/02/22  1149   MAGNESIUM mg/dL 1.8           I reviewed the patient's new clinical results.  I  personally viewed and interpreted the patient's EKG/Telemetry data        Medication Review:   acetaminophen, 1,000 mg, Oral, TID  amLODIPine, 5 mg, Oral, Q24H  aspirin, 81 mg, Oral, Daily  atorvastatin, 40 mg, Oral, Nightly  docusate sodium, 100 mg, Oral, Daily  gabapentin, 200 mg, Oral, Q8H  metoprolol tartrate, 25 mg, Oral, Q12H  nicotine, 1 patch, Transdermal, Q24H  pantoprazole, 40 mg, Oral, Q AM  polyethylene glycol, 17 g, Oral, Daily  sodium chloride, 10 mL, Intravenous, Q12H        heparin, 18 Units/kg/hr, Last Rate: 19 Units/kg/hr (07/02/22 0937)  lactated ringers, 9 mL/hr, Last Rate: Stopped (06/29/22 1830)        Assessment & Plan       Lung nodule    Cerebral meningioma (HCC)    Paroxysmal atrial fibrillation with rapid ventricular response (HCC)    Emphysema lung (HCC)    GERD (gastroesophageal reflux disease)    History of COVID-19    Hypertension    Stroke-like symptoms    Follow-up exam    Acute posthemorrhagic anemia    Hypopotassemia    1.  Paroxysmal Atrial Fibrillation: Heart rate still little high, increase metoprolol to 50 mg twice daily and decrease amlodipine to 2.5 mg daily.  [id number] score of 4 - recommend apixiban when ok with CT surgery.   2.  PVCs noted on telemetry.  Asymptomatic.  3.  Grade 2 diastolic dysfunction noted on echocardiogram.  4.  Moderate mitral valve regurgitation and mild tricuspid regurgitation.  5.  Hypertension-blood pressure low today.  6.  Right lower lobe lung nodule-s/p robot-assisted wedge resection x2, mediastinal lymphadenectomy and intercostal nerve blocks by Dr. Quinones 6/29. Pathology pending   7.  Anemia.    Waiting for chest tube d/c to transition from heparin to apixaban.      Call when CT is out- we will see back then.      Kristen Lewis MD  07/02/22  14:08 EDT

## 2022-07-02 NOTE — PLAN OF CARE
Problem: Fall Injury Risk  Goal: Absence of Fall and Fall-Related Injury  Outcome: Ongoing, Progressing   Goal Outcome Evaluation:  Plan of Care Reviewed With: patient        Progress: no change  Outcome Evaluation: vss. CT to water seal. c/o pain managed with prn medication. up with assist. will monitor.

## 2022-07-02 NOTE — PROGRESS NOTES
"  POST-OPERATIVE NOTE     Chief Complaint: Lung nodule  S/P: Bronchoscopy, right video-assisted thoracoscopy with da Stephenie robot with right lower lobe wedge resection x2 with intercostal nerve blocks and mediastinal lymphadenectomy.  POD # 3    Subjective:  Symptoms:  Stable.  She reports cough and chest pain (tenderness around chest tube.).  No shortness of breath.    Diet:  Adequate intake.    Activity level: Impaired due to weakness.    Pain:  She complains of pain that is mild.  Pain is well controlled.        Objective:  General Appearance:  Comfortable, in no acute distress and ill-appearing.    Vital signs: (most recent): Blood pressure 91/54, pulse 108, temperature 98.5 °F (36.9 °C), temperature source Oral, resp. rate 16, height 170.2 cm (67.01\"), weight 55.9 kg (123 lb 4.8 oz), SpO2 91 %.  Vital signs are normal.  No fever.    Output: Producing urine.    Lungs:  Normal effort.  There are decreased breath sounds.    Heart: Normal rate.  Irregular rhythm.  No murmur. (Atrial fibrillation rate controlled.)  Chest: Symmetric chest wall expansion. Chest wall tenderness (around chest) present.    Abdomen: Abdomen is soft and flat.  Bowel sounds are normal.   There is no abdominal tenderness.     Neurological: Patient is alert and oriented to person, place and time.    Skin:  Warm and dry.              Chest tube:   Site: Right, Clean, Dry, Intact and Securement device intact  Suction: -20 cm  Air Leak: Negative  24 Hour Total: 375ml     Results Review:  I reviewed the patient's new clinical results.  I reviewed the patient's new imaging results and agree with the interpretation.  I personally viewed and interpreted the patient's EKG/Telemetry data    Assessment & Plan      Ms. Morgan is POD #3 s/p VATS wedge resection.  Final surgical pathology remains pending.  She continues to require supplemental oxygen at 2 L/min via nasal cannula.  This morning's chest x-ray with some increased pleural separation and " subcutaneous air.   Still with >300 cc of output.  Will give one more dose of lasix.  Will plan CXR tomorrow and continue chest tube to waterseal.   Elsa Conrad MD  [facility]  07/02/22  14:17 EDT    Patient was seen and assessed while wearing personal protective equipment including facemask, protective eyewear and gloves.  Hand hygiene performed prior to entering the room and upon exiting with doffing of gloves.

## 2022-07-02 NOTE — PROGRESS NOTES
Name: Darling Morgan ADMIT: 2022   : 1950  PCP: Ra Keita MD    MRN: [id number] LOS: 6 days   AGE/SEX: 71 y.o. female  ROOM: [facility]     Subjective   Subjective    Pain from chest tube. No shortness of breath.    Review of Systems   Constitutional: Negative for fever.   Respiratory: Negative for cough and shortness of breath.    Cardiovascular: Positive for chest pain (from chest tube).   Gastrointestinal: Negative for abdominal pain, diarrhea, nausea and vomiting.   Genitourinary: Negative for dysuria.   Neurological: Negative for headaches.      Objective   Objective   Vital Signs  Temp:  [98.1 °F (36.7 °C)-99.4 °F (37.4 °C)] 98.5 °F (36.9 °C)  Heart Rate:  [108] 108  Resp:  [16] 16  BP: ()/(53-76) 91/54  SpO2:  [91 %] 91 %  on  Flow (L/min):  [2] 2;   Device (Oxygen Therapy): nasal cannula  Body mass index is 19.31 kg/m².    Physical Exam  Constitutional:       General: She is not in acute distress.     Appearance: Normal appearance. She is cachectic. She is ill-appearing (chronic). She is not toxic-appearing.   HENT:      Head: Normocephalic and atraumatic.   Cardiovascular:      Rate and Rhythm: Normal rate. Rhythm irregular.   Pulmonary:      Effort: Pulmonary effort is normal. No respiratory distress.      Breath sounds: No wheezing, rhonchi or rales.      Comments: Right chest tube  Abdominal:      General: Bowel sounds are normal.      Palpations: Abdomen is soft.      Tenderness: There is no abdominal tenderness. There is no guarding or rebound.   Musculoskeletal:         General: No swelling.      Right lower leg: No edema.      Left lower leg: No edema.   Skin:     General: Skin is warm and dry.   Neurological:      General: No focal deficit present.      Mental Status: She is alert and oriented to person, place, and time.   Psychiatric:         Mood and Affect: Mood normal.         Behavior: Behavior normal.         Thought Content: Thought content normal.     Results  Review  I reviewed the patient's new clinical results.  Results from last 7 days   Lab Units 07/02/22  0639 07/01/22  0523 06/30/22  0620 06/29/22  0552   WBC 10*3/mm3 6.91 7.74 9.77 5.92   HEMOGLOBIN g/dL 10.0* 9.7* 10.9* 12.0   PLATELETS 10*3/mm3 208 198 218 278     Results from last 7 days   Lab Units 07/02/22  0639 06/30/22  0620 06/29/22  0552   SODIUM mmol/L 140 136 141   POTASSIUM mmol/L 3.4* 4.4 3.8   CHLORIDE mmol/L 106 104 105   CO2 mmol/L 24.0 21.7* 26.6   BUN mg/dL 9 15 15   CREATININE mg/dL 0.58 0.73 0.73   GLUCOSE mg/dL 89 116* 83     Lab Results   Component Value Date    ANIONGAP 10.0 07/02/2022     Estimated Creatinine Clearance: 79.6 mL/min (by C-G formula based on SCr of 0.58 mg/dL).        Results from last 7 days   Lab Units 07/02/22  0639 06/30/22 0620 06/29/22  0552   CALCIUM mg/dL 8.2* 8.4* 9.2         Scheduled Meds  acetaminophen, 1,000 mg, Oral, TID  amLODIPine, 5 mg, Oral, Q24H  aspirin, 81 mg, Oral, Daily  atorvastatin, 40 mg, Oral, Nightly  docusate sodium, 100 mg, Oral, Daily  gabapentin, 200 mg, Oral, Q8H  metoprolol tartrate, 25 mg, Oral, Q12H  nicotine, 1 patch, Transdermal, Q24H  pantoprazole, 40 mg, Oral, Q AM  polyethylene glycol, 17 g, Oral, Daily  sodium chloride, 10 mL, Intravenous, Q12H    Continuous Infusions  heparin, 18 Units/kg/hr, Last Rate: 19 Units/kg/hr (07/02/22 0937)  lactated ringers, 9 mL/hr, Last Rate: Stopped (06/29/22 1830)    PRN Meds  •  acetaminophen **OR** acetaminophen **OR** acetaminophen  •  bisacodyl  •  diazePAM  •  heparin (porcine)  •  hydrALAZINE  •  HYDROmorphone  •  magnesium hydroxide  •  nitroglycerin  •  ondansetron  •  ondansetron **OR** ondansetron  •  oxyCODONE  •  sodium chloride    heparin, 18 Units/kg/hr, Last Rate: 19 Units/kg/hr (07/02/22 0937)  lactated ringers, 9 mL/hr, Last Rate: Stopped (06/29/22 1830)    Diet  Diet Regular; Consistent Carbohydrate, Cardiac    I have personally reviewed:  [x]  Laboratory   []  Microbiology   [x]   Radiology   [x]  EKG/Telemetry   []  Cardiology/Vascular   []  Pathology   []  Records     Assessment/Plan     Active Hospital Problems    Diagnosis  POA   • **Lung nodule [R91.1]  Yes   • Hypopotassemia [E87.6]  Unknown   • Acute posthemorrhagic anemia [D62]  Unknown   • Follow-up exam [Z09]  Not Applicable   • Paroxysmal atrial fibrillation with rapid ventricular response (HCC) [I48.0]  Unknown   • Stroke-like symptoms [R29.90]  Yes   • Emphysema lung (HCC) [J43.9]  Unknown   • GERD (gastroesophageal reflux disease) [K21.9]  Unknown   • History of COVID-19 [Z86.16]  Unknown   • Hypertension [I10]  Unknown   • Cerebral meningioma (HCC) [D32.0]  Yes      Resolved Hospital Problems   No resolved problems to display.     71 y.o. female scheduled for wedge resection right lower lobe lung nodule was in A. fib RVR and holding and admitted.    Lung nodule: Status post thoracoscopy and wedge resection 6/29/2022. Postop care per thoracic surgery.    Postop blood loss anemia expected. Continue to monitor.    New atrial fibrillation with RVR: Currently on heparin until chest tube removed. Apixaban after.    Hypertension: Controlled. Some low normal readings continue to monitor    Hypokalemia: She received IV furosemide yesterday. Replace. Check magnesium.    Meningioma seen on MRI brain.    COPD    Discussed with patient and nursing staff     Discharge: To be determined    Alberto Hernández MD  [facility]  07/02/22

## 2022-07-03 ENCOUNTER — APPOINTMENT (OUTPATIENT)
Dept: GENERAL RADIOLOGY | Facility: HOSPITAL | Age: 72
End: 2022-07-03

## 2022-07-03 LAB
APTT PPP: 73.4 SECONDS (ref 22.7–35.4)
BASOPHILS # BLD AUTO: 0.01 10*3/MM3 (ref 0–0.2)
BASOPHILS NFR BLD AUTO: 0.1 % (ref 0–1.5)
DEPRECATED RDW RBC AUTO: 42.3 FL (ref 37–54)
EOSINOPHIL # BLD AUTO: 0.17 10*3/MM3 (ref 0–0.4)
EOSINOPHIL NFR BLD AUTO: 2.2 % (ref 0.3–6.2)
ERYTHROCYTE [DISTWIDTH] IN BLOOD BY AUTOMATED COUNT: 13.1 % (ref 12.3–15.4)
HCT VFR BLD AUTO: 29.8 % (ref 34–46.6)
HGB BLD-MCNC: 10.2 G/DL (ref 12–15.9)
IMM GRANULOCYTES # BLD AUTO: 0.04 10*3/MM3 (ref 0–0.05)
IMM GRANULOCYTES NFR BLD AUTO: 0.5 % (ref 0–0.5)
LYMPHOCYTES # BLD AUTO: 1.94 10*3/MM3 (ref 0.7–3.1)
LYMPHOCYTES NFR BLD AUTO: 24.7 % (ref 19.6–45.3)
MCH RBC QN AUTO: 30.9 PG (ref 26.6–33)
MCHC RBC AUTO-ENTMCNC: 34.2 G/DL (ref 31.5–35.7)
MCV RBC AUTO: 90.3 FL (ref 79–97)
MONOCYTES # BLD AUTO: 0.72 10*3/MM3 (ref 0.1–0.9)
MONOCYTES NFR BLD AUTO: 9.2 % (ref 5–12)
NEUTROPHILS NFR BLD AUTO: 4.97 10*3/MM3 (ref 1.7–7)
NEUTROPHILS NFR BLD AUTO: 63.3 % (ref 42.7–76)
NRBC BLD AUTO-RTO: 0 /100 WBC (ref 0–0.2)
PLATELET # BLD AUTO: 225 10*3/MM3 (ref 140–450)
PMV BLD AUTO: 9.6 FL (ref 6–12)
RBC # BLD AUTO: 3.3 10*6/MM3 (ref 3.77–5.28)
WBC NRBC COR # BLD: 7.85 10*3/MM3 (ref 3.4–10.8)

## 2022-07-03 PROCEDURE — 85025 COMPLETE CBC W/AUTO DIFF WBC: CPT | Performed by: THORACIC SURGERY (CARDIOTHORACIC VASCULAR SURGERY)

## 2022-07-03 PROCEDURE — 99232 SBSQ HOSP IP/OBS MODERATE 35: CPT | Performed by: THORACIC SURGERY (CARDIOTHORACIC VASCULAR SURGERY)

## 2022-07-03 PROCEDURE — 71045 X-RAY EXAM CHEST 1 VIEW: CPT

## 2022-07-03 PROCEDURE — 85730 THROMBOPLASTIN TIME PARTIAL: CPT | Performed by: HOSPITALIST

## 2022-07-03 PROCEDURE — 25010000002 HEPARIN (PORCINE) 25000-0.45 UT/250ML-% SOLUTION: Performed by: NURSE PRACTITIONER

## 2022-07-03 RX ORDER — OXYCODONE HYDROCHLORIDE 5 MG/1
5 TABLET ORAL EVERY 6 HOURS PRN
Qty: 30 TABLET | Refills: 0 | Status: SHIPPED | OUTPATIENT
Start: 2022-07-03 | End: 2022-07-11

## 2022-07-03 RX ORDER — ACETAMINOPHEN 500 MG
1000 TABLET ORAL 3 TIMES DAILY
Qty: 68 TABLET | Refills: 0 | Status: SHIPPED | OUTPATIENT
Start: 2022-07-03 | End: 2022-07-15

## 2022-07-03 RX ORDER — GABAPENTIN 100 MG/1
200 CAPSULE ORAL EVERY 8 HOURS SCHEDULED
Qty: 90 CAPSULE | Refills: 0 | Status: SHIPPED | OUTPATIENT
Start: 2022-07-03 | End: 2022-08-17 | Stop reason: SDUPTHER

## 2022-07-03 RX ORDER — DIAZEPAM 2 MG/1
2 TABLET ORAL EVERY 8 HOURS PRN
Qty: 15 TABLET | Refills: 0 | Status: SHIPPED | OUTPATIENT
Start: 2022-07-03 | End: 2022-07-08

## 2022-07-03 RX ADMIN — ACETAMINOPHEN 1000 MG: 500 TABLET ORAL at 17:19

## 2022-07-03 RX ADMIN — POTASSIUM CHLORIDE 20 MEQ: 750 TABLET, EXTENDED RELEASE ORAL at 08:33

## 2022-07-03 RX ADMIN — METOPROLOL TARTRATE 50 MG: 50 TABLET, FILM COATED ORAL at 20:21

## 2022-07-03 RX ADMIN — ATORVASTATIN CALCIUM 40 MG: 20 TABLET, FILM COATED ORAL at 20:21

## 2022-07-03 RX ADMIN — GABAPENTIN 200 MG: 100 CAPSULE ORAL at 08:33

## 2022-07-03 RX ADMIN — ACETAMINOPHEN 1000 MG: 500 TABLET ORAL at 20:21

## 2022-07-03 RX ADMIN — METOPROLOL TARTRATE 50 MG: 50 TABLET, FILM COATED ORAL at 08:35

## 2022-07-03 RX ADMIN — OXYCODONE 5 MG: 5 TABLET ORAL at 13:23

## 2022-07-03 RX ADMIN — GABAPENTIN 200 MG: 100 CAPSULE ORAL at 22:29

## 2022-07-03 RX ADMIN — OXYCODONE 5 MG: 5 TABLET ORAL at 06:18

## 2022-07-03 RX ADMIN — GABAPENTIN 200 MG: 100 CAPSULE ORAL at 00:21

## 2022-07-03 RX ADMIN — ACETAMINOPHEN 1000 MG: 500 TABLET ORAL at 08:35

## 2022-07-03 RX ADMIN — OXYCODONE 5 MG: 5 TABLET ORAL at 21:04

## 2022-07-03 RX ADMIN — PANTOPRAZOLE SODIUM 40 MG: 40 TABLET, DELAYED RELEASE ORAL at 06:18

## 2022-07-03 RX ADMIN — HEPARIN SODIUM 19 UNITS/KG/HR: 10000 INJECTION, SOLUTION INTRAVENOUS at 08:46

## 2022-07-03 RX ADMIN — NYSTATIN 500000 UNITS: 100000 SUSPENSION ORAL at 17:19

## 2022-07-03 RX ADMIN — NYSTATIN 500000 UNITS: 100000 SUSPENSION ORAL at 08:34

## 2022-07-03 RX ADMIN — APIXABAN 5 MG: 5 TABLET, FILM COATED ORAL at 20:21

## 2022-07-03 RX ADMIN — ASPIRIN 81 MG: 81 TABLET, CHEWABLE ORAL at 08:34

## 2022-07-03 RX ADMIN — Medication 10 ML: at 20:22

## 2022-07-03 RX ADMIN — OXYCODONE 5 MG: 5 TABLET ORAL at 17:19

## 2022-07-03 RX ADMIN — NICOTINE 1 PATCH: 21 PATCH, EXTENDED RELEASE TRANSDERMAL at 08:36

## 2022-07-03 RX ADMIN — GABAPENTIN 200 MG: 100 CAPSULE ORAL at 17:19

## 2022-07-03 RX ADMIN — NYSTATIN 500000 UNITS: 100000 SUSPENSION ORAL at 20:21

## 2022-07-03 NOTE — PLAN OF CARE
Problem: Fall Injury Risk  Goal: Absence of Fall and Fall-Related Injury  Outcome: Ongoing, Progressing   Goal Outcome Evaluation:  Plan of Care Reviewed With: patient        Progress: improving  Outcome Evaluation: VSS, remains on 2 L oxygen. CT to water seal. Pain managed with prn medication. Pulm hygiene/physical activity encouraged. Will monitor.

## 2022-07-03 NOTE — PLAN OF CARE
Goal Outcome Evaluation:  Plan of Care Reviewed With: patient        Progress: no change  Outcome Evaluation: Monitor pain,labs,and vitals. VSS. CT to waterseal. Heparin drip cont'd. Pain controlled with PRN medications per orders. Will continue to monitor.

## 2022-07-03 NOTE — PROGRESS NOTES
Name: Darling Morgan ADMIT: 2022   : 1950  PCP: Ra Keita MD    MRN: [id number] LOS: 7 days   AGE/SEX: 71 y.o. female  ROOM: [facility]     Subjective   Subjective    She feels better today. Still with some pain from chest tube.    Review of Systems   Constitutional: Negative for fever.   Respiratory: Negative for cough and shortness of breath.    Cardiovascular: Positive for chest pain (from chest tube).   Gastrointestinal: Negative for abdominal pain, diarrhea, nausea and vomiting.   Genitourinary: Negative for dysuria.   Neurological: Negative for headaches.      Objective   Objective   Vital Signs  Temp:  [97.5 °F (36.4 °C)-98.9 °F (37.2 °C)] 98.9 °F (37.2 °C)  Heart Rate:  [] 93  Resp:  [16] 16  BP: ()/(54-94) 99/54  SpO2:  [91 %] 91 %  on  Flow (L/min):  [2] 2;   Device (Oxygen Therapy): nasal cannula  Body mass index is 19.31 kg/m².    Physical Exam  Constitutional:       General: She is not in acute distress.     Appearance: Normal appearance. She is cachectic. She is ill-appearing (chronic). She is not toxic-appearing.   HENT:      Head: Normocephalic and atraumatic.   Cardiovascular:      Rate and Rhythm: Normal rate. Rhythm irregular.   Pulmonary:      Effort: Pulmonary effort is normal. No respiratory distress.      Breath sounds: No wheezing, rhonchi or rales.      Comments: Right chest tube  Abdominal:      General: Bowel sounds are normal.      Palpations: Abdomen is soft.      Tenderness: There is no abdominal tenderness. There is no guarding or rebound.   Musculoskeletal:         General: No swelling.      Right lower leg: No edema.      Left lower leg: No edema.   Skin:     General: Skin is warm and dry.   Neurological:      General: No focal deficit present.      Mental Status: She is alert and oriented to person, place, and time.   Psychiatric:         Mood and Affect: Mood normal.         Behavior: Behavior normal.         Thought Content: Thought content normal.      Results Review  I reviewed the patient's new clinical results.  Results from last 7 days   Lab Units 07/03/22  0748 07/02/22  0639 07/01/22  0523 06/30/22  0620   WBC 10*3/mm3 7.85 6.91 7.74 9.77   HEMOGLOBIN g/dL 10.2* 10.0* 9.7* 10.9*   PLATELETS 10*3/mm3 225 208 198 218     Results from last 7 days   Lab Units 07/02/22  0639 06/30/22  0620 06/29/22  0552   SODIUM mmol/L 140 136 141   POTASSIUM mmol/L 3.4* 4.4 3.8   CHLORIDE mmol/L 106 104 105   CO2 mmol/L 24.0 21.7* 26.6   BUN mg/dL 9 15 15   CREATININE mg/dL 0.58 0.73 0.73   GLUCOSE mg/dL 89 116* 83     Lab Results   Component Value Date    ANIONGAP 10.0 07/02/2022     Estimated Creatinine Clearance: 79.6 mL/min (by C-G formula based on SCr of 0.58 mg/dL).        Results from last 7 days   Lab Units 07/02/22  1149 07/02/22  0639 06/30/22  0620 06/29/22  0552   CALCIUM mg/dL  --  8.2* 8.4* 9.2   MAGNESIUM mg/dL 1.8  --   --   --          Scheduled Meds  acetaminophen, 1,000 mg, Oral, TID  amLODIPine, 2.5 mg, Oral, Q24H  aspirin, 81 mg, Oral, Daily  atorvastatin, 40 mg, Oral, Nightly  docusate sodium, 100 mg, Oral, Daily  gabapentin, 200 mg, Oral, Q8H  metoprolol tartrate, 50 mg, Oral, Q12H  nicotine, 1 patch, Transdermal, Q24H  nystatin, 5 mL, Oral, 4x Daily  pantoprazole, 40 mg, Oral, Q AM  polyethylene glycol, 17 g, Oral, Daily  potassium chloride, 20 mEq, Oral, Daily  sodium chloride, 10 mL, Intravenous, Q12H    Continuous Infusions  heparin, 18 Units/kg/hr, Last Rate: 19 Units/kg/hr (07/03/22 0846)  lactated ringers, 9 mL/hr, Last Rate: Stopped (06/29/22 1830)    PRN Meds  •  acetaminophen **OR** acetaminophen **OR** acetaminophen  •  bisacodyl  •  diazePAM  •  heparin (porcine)  •  hydrALAZINE  •  HYDROmorphone  •  magnesium hydroxide  •  nitroglycerin  •  ondansetron  •  ondansetron **OR** ondansetron  •  oxyCODONE  •  potassium chloride **OR** potassium chloride **OR** potassium chloride  •  sodium chloride    heparin, 18 Units/kg/hr, Last Rate: 19  Units/kg/hr (07/03/22 0846)  lactated ringers, 9 mL/hr, Last Rate: Stopped (06/29/22 1830)    Diet  Diet Regular; Consistent Carbohydrate, Cardiac    I have personally reviewed:  [x]  Laboratory   []  Microbiology   [x]  Radiology   []  EKG/Telemetry   []  Cardiology/Vascular   []  Pathology   []  Records     Assessment/Plan     Active Hospital Problems    Diagnosis  POA   • **Lung nodule [R91.1]  Yes   • Hypopotassemia [E87.6]  Unknown   • Acute posthemorrhagic anemia [D62]  Unknown   • Follow-up exam [Z09]  Not Applicable   • Paroxysmal atrial fibrillation with rapid ventricular response (HCC) [I48.0]  Unknown   • Stroke-like symptoms [R29.90]  Yes   • Emphysema lung (HCC) [J43.9]  Unknown   • GERD (gastroesophageal reflux disease) [K21.9]  Unknown   • History of COVID-19 [Z86.16]  Unknown   • Hypertension [I10]  Unknown   • Cerebral meningioma (HCC) [D32.0]  Yes      Resolved Hospital Problems   No resolved problems to display.     71 y.o. female scheduled for wedge resection right lower lobe lung nodule was in A. fib RVR and holding and admitted.    Lung nodule: Status post thoracoscopy and wedge resection 6/29/2022. Postop care per thoracic surgery.    Postop blood loss anemia expected. Stable, continue to monitor.    New atrial fibrillation with RVR: Currently on heparin until chest tube removed. Apixaban after.    Hypertension: Controlled. Some low normal readings and one high reading this morning better now continue to monitor    Hypokalemia: On protocol. Magnesium 1.8.    Meningioma seen on MRI brain.    COPD    Discussed with patient and nursing staff     Discharge: To be determined    Alberto Hernández MD  [facility]  07/03/22

## 2022-07-03 NOTE — PROGRESS NOTES
"  POST-OPERATIVE NOTE     Chief Complaint: Lung nodule  S/P: Bronchoscopy, right video-assisted thoracoscopy with da Stephenie robot with right lower lobe wedge resection x2 with intercostal nerve blocks and mediastinal lymphadenectomy.  POD # 4    Subjective:  Symptoms:  Stable.  She reports cough and chest pain (tenderness around chest tube.).  No shortness of breath.    Diet:  Adequate intake.    Activity level: Impaired due to weakness.    Pain:  She complains of pain that is mild.  Pain is well controlled.        Objective:  General Appearance:  Comfortable, in no acute distress and ill-appearing.    Vital signs: (most recent): Blood pressure 100/67, pulse 103, temperature 99.1 °F (37.3 °C), temperature source Oral, resp. rate 16, height 170.2 cm (67.01\"), weight 55.9 kg (123 lb 4.8 oz), SpO2 96 %.  Vital signs are normal.  No fever.    Output: Producing urine.    Lungs:  Normal effort.  There are decreased breath sounds.    Heart: Normal rate.  Irregular rhythm.  No murmur. (Atrial fibrillation rate controlled.)  Chest: Symmetric chest wall expansion. Chest wall tenderness (around chest) present.    Abdomen: Abdomen is soft and flat.  Bowel sounds are normal.   There is no abdominal tenderness.     Neurological: Patient is alert and oriented to person, place and time.    Skin:  Warm and dry.              Chest tube:   Site: Right, Clean, Dry, Intact and Securement device intact  Suction:waterseal  Air Leak: Negative  24 Hour Total: 270 ml     Results Review:  I reviewed the patient's new clinical results.  I reviewed the patient's new imaging results and agree with the interpretation.  I personally viewed and interpreted the patient's EKG/Telemetry data    Assessment & Plan      Ms. Morgan is POD #4 s/p VATS wedge resection.  Final surgical pathology remains pending.  No airleak and decreased output from her chest tube.  Her chest x-ray does not demonstrate any change.  Her chest tube was removed at bedside without " difficulty.  We will plan to repeat chest x-ray in the morning and if this is stable, she will be able to be discharged.    Ruben Conrad MD  [facility]  07/03/22  15:31 EDT    Patient was seen and assessed while wearing personal protective equipment including facemask, protective eyewear and gloves.  Hand hygiene performed prior to entering the room and upon exiting with doffing of gloves.

## 2022-07-04 ENCOUNTER — READMISSION MANAGEMENT (OUTPATIENT)
Dept: CALL CENTER | Facility: HOSPITAL | Age: 72
End: 2022-07-04

## 2022-07-04 ENCOUNTER — APPOINTMENT (OUTPATIENT)
Dept: GENERAL RADIOLOGY | Facility: HOSPITAL | Age: 72
End: 2022-07-04

## 2022-07-04 VITALS
TEMPERATURE: 98 F | RESPIRATION RATE: 16 BRPM | DIASTOLIC BLOOD PRESSURE: 67 MMHG | HEIGHT: 67 IN | BODY MASS INDEX: 19.35 KG/M2 | HEART RATE: 103 BPM | OXYGEN SATURATION: 96 % | SYSTOLIC BLOOD PRESSURE: 121 MMHG | WEIGHT: 123.3 LBS

## 2022-07-04 LAB
ANION GAP SERPL CALCULATED.3IONS-SCNC: 11 MMOL/L (ref 5–15)
APTT PPP: 38.8 SECONDS (ref 22.7–35.4)
BASOPHILS # BLD AUTO: 0.01 10*3/MM3 (ref 0–0.2)
BASOPHILS NFR BLD AUTO: 0.2 % (ref 0–1.5)
BUN SERPL-MCNC: 11 MG/DL (ref 8–23)
BUN/CREAT SERPL: 16.4 (ref 7–25)
CALCIUM SPEC-SCNC: 8.7 MG/DL (ref 8.6–10.5)
CHLORIDE SERPL-SCNC: 101 MMOL/L (ref 98–107)
CO2 SERPL-SCNC: 28 MMOL/L (ref 22–29)
CREAT SERPL-MCNC: 0.67 MG/DL (ref 0.57–1)
DEPRECATED RDW RBC AUTO: 43.5 FL (ref 37–54)
EGFRCR SERPLBLD CKD-EPI 2021: 94.2 ML/MIN/1.73
EOSINOPHIL # BLD AUTO: 0.1 10*3/MM3 (ref 0–0.4)
EOSINOPHIL NFR BLD AUTO: 1.7 % (ref 0.3–6.2)
ERYTHROCYTE [DISTWIDTH] IN BLOOD BY AUTOMATED COUNT: 13 % (ref 12.3–15.4)
GLUCOSE SERPL-MCNC: 88 MG/DL (ref 65–99)
HCT VFR BLD AUTO: 32.3 % (ref 34–46.6)
HGB BLD-MCNC: 10.6 G/DL (ref 12–15.9)
IMM GRANULOCYTES # BLD AUTO: 0.05 10*3/MM3 (ref 0–0.05)
IMM GRANULOCYTES NFR BLD AUTO: 0.9 % (ref 0–0.5)
LYMPHOCYTES # BLD AUTO: 1.87 10*3/MM3 (ref 0.7–3.1)
LYMPHOCYTES NFR BLD AUTO: 32.6 % (ref 19.6–45.3)
MCH RBC QN AUTO: 30.2 PG (ref 26.6–33)
MCHC RBC AUTO-ENTMCNC: 32.8 G/DL (ref 31.5–35.7)
MCV RBC AUTO: 92 FL (ref 79–97)
MONOCYTES # BLD AUTO: 0.5 10*3/MM3 (ref 0.1–0.9)
MONOCYTES NFR BLD AUTO: 8.7 % (ref 5–12)
NEUTROPHILS NFR BLD AUTO: 3.21 10*3/MM3 (ref 1.7–7)
NEUTROPHILS NFR BLD AUTO: 55.9 % (ref 42.7–76)
NRBC BLD AUTO-RTO: 0 /100 WBC (ref 0–0.2)
PLATELET # BLD AUTO: 256 10*3/MM3 (ref 140–450)
PMV BLD AUTO: 9.9 FL (ref 6–12)
POTASSIUM SERPL-SCNC: 3.8 MMOL/L (ref 3.5–5.2)
RBC # BLD AUTO: 3.51 10*6/MM3 (ref 3.77–5.28)
SODIUM SERPL-SCNC: 140 MMOL/L (ref 136–145)
WBC NRBC COR # BLD: 5.74 10*3/MM3 (ref 3.4–10.8)

## 2022-07-04 PROCEDURE — 99232 SBSQ HOSP IP/OBS MODERATE 35: CPT | Performed by: INTERNAL MEDICINE

## 2022-07-04 PROCEDURE — 80048 BASIC METABOLIC PNL TOTAL CA: CPT | Performed by: HOSPITALIST

## 2022-07-04 PROCEDURE — 85025 COMPLETE CBC W/AUTO DIFF WBC: CPT | Performed by: THORACIC SURGERY (CARDIOTHORACIC VASCULAR SURGERY)

## 2022-07-04 PROCEDURE — 85730 THROMBOPLASTIN TIME PARTIAL: CPT | Performed by: HOSPITALIST

## 2022-07-04 PROCEDURE — 71045 X-RAY EXAM CHEST 1 VIEW: CPT

## 2022-07-04 PROCEDURE — 99232 SBSQ HOSP IP/OBS MODERATE 35: CPT | Performed by: THORACIC SURGERY (CARDIOTHORACIC VASCULAR SURGERY)

## 2022-07-04 RX ORDER — POTASSIUM CHLORIDE 20 MEQ/1
20 TABLET, EXTENDED RELEASE ORAL DAILY
Qty: 30 TABLET | Refills: 0 | Status: SHIPPED | OUTPATIENT
Start: 2022-07-05 | End: 2022-08-02 | Stop reason: SDUPTHER

## 2022-07-04 RX ORDER — NICOTINE 21 MG/24HR
1 PATCH, TRANSDERMAL 24 HOURS TRANSDERMAL
Qty: 10 EACH | Refills: 0 | Status: SHIPPED | OUTPATIENT
Start: 2022-07-05 | End: 2022-08-05

## 2022-07-04 RX ORDER — METOPROLOL TARTRATE 50 MG/1
50 TABLET, FILM COATED ORAL EVERY 12 HOURS SCHEDULED
Qty: 60 TABLET | Refills: 0 | Status: SHIPPED | OUTPATIENT
Start: 2022-07-04 | End: 2022-08-02 | Stop reason: SDUPTHER

## 2022-07-04 RX ORDER — AMLODIPINE BESYLATE 2.5 MG/1
2.5 TABLET ORAL
Qty: 30 TABLET | Refills: 0 | Status: SHIPPED | OUTPATIENT
Start: 2022-07-05 | End: 2022-08-02 | Stop reason: SDUPTHER

## 2022-07-04 RX ORDER — POLYETHYLENE GLYCOL 3350 17 G/17G
17 POWDER, FOR SOLUTION ORAL DAILY
Qty: 30 EACH | Refills: 0 | Status: SHIPPED | OUTPATIENT
Start: 2022-07-05 | End: 2022-08-05

## 2022-07-04 RX ORDER — ATORVASTATIN CALCIUM 40 MG/1
40 TABLET, FILM COATED ORAL NIGHTLY
Qty: 30 TABLET | Refills: 0 | Status: SHIPPED | OUTPATIENT
Start: 2022-07-04 | End: 2022-08-02 | Stop reason: SDUPTHER

## 2022-07-04 RX ADMIN — OXYCODONE 5 MG: 5 TABLET ORAL at 14:07

## 2022-07-04 RX ADMIN — GABAPENTIN 200 MG: 100 CAPSULE ORAL at 12:16

## 2022-07-04 RX ADMIN — METOPROLOL TARTRATE 50 MG: 50 TABLET, FILM COATED ORAL at 09:50

## 2022-07-04 RX ADMIN — APIXABAN 5 MG: 5 TABLET, FILM COATED ORAL at 09:50

## 2022-07-04 RX ADMIN — POLYETHYLENE GLYCOL 3350 17 G: 17 POWDER, FOR SOLUTION ORAL at 09:50

## 2022-07-04 RX ADMIN — ASPIRIN 81 MG: 81 TABLET, CHEWABLE ORAL at 09:50

## 2022-07-04 RX ADMIN — NYSTATIN 500000 UNITS: 100000 SUSPENSION ORAL at 09:50

## 2022-07-04 RX ADMIN — NYSTATIN 500000 UNITS: 100000 SUSPENSION ORAL at 12:16

## 2022-07-04 RX ADMIN — ACETAMINOPHEN 1000 MG: 500 TABLET ORAL at 09:50

## 2022-07-04 RX ADMIN — AMLODIPINE BESYLATE 2.5 MG: 2.5 TABLET ORAL at 09:49

## 2022-07-04 RX ADMIN — NICOTINE 1 PATCH: 21 PATCH, EXTENDED RELEASE TRANSDERMAL at 09:56

## 2022-07-04 RX ADMIN — PANTOPRAZOLE SODIUM 40 MG: 40 TABLET, DELAYED RELEASE ORAL at 06:01

## 2022-07-04 RX ADMIN — DOCUSATE SODIUM 100 MG: 100 CAPSULE, LIQUID FILLED ORAL at 09:50

## 2022-07-04 RX ADMIN — POTASSIUM CHLORIDE 20 MEQ: 750 TABLET, EXTENDED RELEASE ORAL at 09:50

## 2022-07-04 RX ADMIN — OXYCODONE 5 MG: 5 TABLET ORAL at 10:06

## 2022-07-04 RX ADMIN — OXYCODONE 5 MG: 5 TABLET ORAL at 06:01

## 2022-07-04 RX ADMIN — Medication 10 ML: at 09:51

## 2022-07-04 NOTE — PROGRESS NOTES
"  POST-OPERATIVE NOTE     Chief Complaint: Lung nodule  S/P: Bronchoscopy, right video-assisted thoracoscopy with da Stephenie robot with right lower lobe wedge resection x2 with intercostal nerve blocks and mediastinal lymphadenectomy.  POD # 5    Subjective:  Symptoms:  Stable.  She reports cough and chest pain (tenderness around chest tube.).  No shortness of breath.    Diet:  Adequate intake.    Activity level: Impaired due to weakness.    Pain:  She complains of pain that is mild.  Pain is well controlled.        Objective:  General Appearance:  Comfortable, in no acute distress and ill-appearing.    Vital signs: (most recent): Blood pressure 121/67, pulse 103, temperature 98 °F (36.7 °C), temperature source Oral, resp. rate 16, height 170.2 cm (67.01\"), weight 55.9 kg (123 lb 4.8 oz), SpO2 96 %.  Vital signs are normal.  No fever.    Output: Producing urine.    Lungs:  Normal effort.  There are decreased breath sounds.    Heart: Normal rate.  Irregular rhythm.  No murmur. (Atrial fibrillation rate controlled.)  Chest: Symmetric chest wall expansion. Chest wall tenderness (around chest) present.    Abdomen: Abdomen is soft and flat.  Bowel sounds are normal.   There is no abdominal tenderness.     Neurological: Patient is alert and oriented to person, place and time.    Skin:  Warm and dry.              Results Review:  I reviewed the patient's new clinical results.  I reviewed the patient's new imaging results and agree with the interpretation.  I personally viewed and interpreted the patient's EKG/Telemetry data    Assessment & Plan      Ms. Morgan is POD #5 s/p VATS wedge resection.  Final surgical pathology remains pending.  Chest tube removed yesterday without difficulty at bedside.  Chest x-ray stable.  Okay for discharge per thoracic surgery at any point.  She will need to follow-up in our office in 2 weeks with a chest x-ray.  Elsa Conrad MD  [facility]  07/04/22  11:33 EDT    Patient " was seen and assessed while wearing personal protective equipment including facemask, protective eyewear and gloves.  Hand hygiene performed prior to entering the room and upon exiting with doffing of gloves.

## 2022-07-04 NOTE — PLAN OF CARE
Goal Outcome Evaluation:  Plan of Care Reviewed With: patient        Progress: improving  Outcome Evaluation: VSS. on RA. HR in NSR. Old drainage on chest tube site. PO pain pills given x 2. Up ad devan

## 2022-07-04 NOTE — DISCHARGE SUMMARY
[facility]    Date of Discharge:  7/4/2022    PCP: Ra Keita MD    Discharge Diagnosis:   Active Hospital Problems    Diagnosis  POA   • **Lung nodule [R91.1]  Yes   • Hypopotassemia [E87.6]  Unknown   • Acute posthemorrhagic anemia [D62]  Unknown   • Follow-up exam [Z09]  Not Applicable   • Paroxysmal atrial fibrillation with rapid ventricular response (HCC) [I48.0]  Unknown   • Stroke-like symptoms [R29.90]  Yes   • Emphysema lung (HCC) [J43.9]  Unknown   • GERD (gastroesophageal reflux disease) [K21.9]  Unknown   • History of COVID-19 [Z86.16]  Unknown   • Hypertension [I10]  Unknown   • Cerebral meningioma (HCC) [D32.0]  Yes      Resolved Hospital Problems   No resolved problems to display.     Procedure(s):  BRONCHOSCOPY, RIGHT VIDEO ASSISTED THORACOSCOPY WITH DAVINCI ROBOT WITH RIGHT LOWER LOBE WEDGE RESECTION x2 WITH INTERCOSTAL NERVE BLOCKS AND MEDIASTINAL LYMPHADENECTOMY     Consults     Date and Time Order Name Status Description    6/24/2022 12:41 PM Inpatient Neurology Consult Stroke Completed     6/24/2022 12:02 PM Inpatient Consult to Cardiology Completed         Hospital Course  71 y.o. female who was electively scheduled for resection of a lung nodule found to be in atrial fibrillation with RVR in holding and admitted. She was seen by cardiology started on heparin drip. Rate control is improved. She stabilized and underwent thoracoscopy and wedge resection 6/29/2022. She was managed postoperatively by thoracic surgery. Chest tube was removed today and she will follow-up with thoracic surgery in 2 weeks with a chest x-ray. Cardiology initially recommended Eliquis for anticoagulation however that is not covered by her insurance (per epic) and so she is going to be switched to Xarelto (cardiology okayed and she will be given a card). She will follow-up with cardiology in 4 weeks.    I discussed the patient's findings and my recommendations  with patient and nursing staff and Dr. Lewis    Condition on Discharge: Improved.     Temp:  [98 °F (36.7 °C)-98.6 °F (37 °C)] 98 °F (36.7 °C)  Heart Rate:  [103] 103  Resp:  [16] 16  BP: (108-124)/(58-76) 121/67  Body mass index is 19.31 kg/m².    Physical Exam  Constitutional:       General: She is not in acute distress.     Appearance: Normal appearance. She is not toxic-appearing.   HENT:      Head: Normocephalic and atraumatic.   Cardiovascular:      Rate and Rhythm: Normal rate. Rhythm irregular.   Pulmonary:      Effort: Pulmonary effort is normal. No respiratory distress.      Breath sounds: Normal breath sounds. No wheezing, rhonchi or rales.   Abdominal:      General: Bowel sounds are normal.      Palpations: Abdomen is soft.      Tenderness: There is no abdominal tenderness. There is no guarding or rebound.   Musculoskeletal:         General: No swelling.   Skin:     General: Skin is warm and dry.   Neurological:      General: No focal deficit present.      Mental Status: She is alert and oriented to person, place, and time.   Psychiatric:         Mood and Affect: Mood normal.         Behavior: Behavior normal.         Thought Content: Thought content normal.       Disposition: Home or Self Care       Discharge Medications      New Medications      Instructions Start Date   Acetaminophen Extra Strength 500 MG tablet  Generic drug: acetaminophen   1,000 mg, Oral, 3 Times Daily      amLODIPine 2.5 MG tablet  Commonly known as: NORVASC   2.5 mg, Oral, Every 24 Hours Scheduled   Start Date: July 5, 2022     atorvastatin 40 MG tablet  Commonly known as: LIPITOR   40 mg, Oral, Nightly      diazePAM 2 MG tablet  Commonly known as: VALIUM   2 mg, Oral, Every 8 Hours PRN      gabapentin 100 MG capsule  Commonly known as: NEURONTIN   200 mg, Oral, Every 8 Hours Scheduled      metoprolol tartrate 50 MG tablet  Commonly known as: LOPRESSOR   50 mg, Oral, Every 12 Hours Scheduled      nicotine 21 MG/24HR  patch  Commonly known as: NICODERM CQ   1 patch, Transdermal, Every 24 Hours Scheduled   Start Date: July 5, 2022     nystatin 100,000 unit/mL suspension  Commonly known as: MYCOSTATIN   500,000 Units, Oral, 4 Times Daily      oxyCODONE 5 MG immediate release tablet  Commonly known as: ROXICODONE   5 mg, Oral, Every 6 Hours PRN      polyethylene glycol 17 g packet  Commonly known as: MIRALAX   17 g, Oral, Daily   Start Date: July 5, 2022     potassium chloride 20 MEQ CR tablet  Commonly known as: K-DUR,KLOR-CON   20 mEq, Oral, Daily   Start Date: July 5, 2022     rivaroxaban 20 MG tablet  Commonly known as: Xarelto   20 mg, Oral, Daily With Dinner         Continue These Medications      Instructions Start Date   amphetamine-dextroamphetamine 20 MG tablet  Commonly known as: ADDERALL   20 mg, Oral, Daily, HOLD PRIOR TO SURG      omeprazole 20 MG capsule  Commonly known as: priLOSEC   40 mg, Oral, Daily, TAKES TWO CAPSULE BY MOUTH DAILY          Stop These Medications    CHLORHEXIDINE GLUCONATE CLOTH EX     EXCEDRIN ASPIRIN FREE PO     zolpidem CR 12.5 MG CR tablet  Commonly known as: AMBIEN CR           Diet Instructions     Diet: Regular, Cardiac, Consistent Carbohydrate      Discharge Diet:  Regular  Cardiac  Consistent Carbohydrate            Activity Instructions     Activity as Tolerated           Additional Instructions for the Follow-ups that You Need to Schedule     Call MD for problems / concerns.   As directed         Follow-up Information     Ketan Fields MD Follow up in 1 month(s).    Specialty: Cardiology  Why: with MAGNOLIA bower  Contact information:  [address] [facility] [address]  [place] [address]  [phone]             Bharathi Quinones III, MD Follow up in 2 week(s).    Specialty: Thoracic Surgery  Why: with CXR  Contact information:  [address] [facility] [address]  [place] [address]  [phone]             Ra Keita MD Follow up in 1 week(s).    Specialty: Family Medicine  Contact  information:  [address] [facility] [address]  [phone]                        Pending Labs     Order Current Status    Tissue Pathology Exam In process         Alberto Hernández MD  [facility]  07/04/22    Discharge time spent greater than 30 minutes.

## 2022-07-04 NOTE — PROGRESS NOTES
LOS: 8 days   Patient Care Team:  Ra Keita MD as PCP - General (Family Medicine)    Chief Complaint:     F/u a fib    Interval History:     She has no chest pain, dyspnea, heart racing, dizziness or nausea.     Objective   Vital Signs  Temp:  [98.1 °F (36.7 °C)-99.1 °F (37.3 °C)] 98.1 °F (36.7 °C)  Heart Rate:  [103] 103  Resp:  [16] 16  BP: ()/(58-76) 124/76  No intake or output data in the 24 hours ending 07/04/22 1023    Comfortable NAD  Neck supple, no JVD or thyromegaly appreciated  S1/S2 irregular, rate controlled, no m/r/g  Lungs CTA B, normal effort  Abdomen S/NT/ND (+) BS, no HSM appreciated  Extremities warm, no clubbing, cyanosis, or edema  No visible or palpable skin lesions  A/Ox4, mood and affect appropriate    Results Review:      Results from last 7 days   Lab Units 07/04/22  0849 07/02/22  0639 06/30/22  0620   SODIUM mmol/L 140 140 136   POTASSIUM mmol/L 3.8 3.4* 4.4   CHLORIDE mmol/L 101 106 104   CO2 mmol/L 28.0 24.0 21.7*   BUN mg/dL 11 9 15   CREATININE mg/dL 0.67 0.58 0.73   GLUCOSE mg/dL 88 89 116*   CALCIUM mg/dL 8.7 8.2* 8.4*         Results from last 7 days   Lab Units 07/04/22  0554 07/03/22  0748 07/02/22  0639   WBC 10*3/mm3 5.74 7.85 6.91   HEMOGLOBIN g/dL 10.6* 10.2* 10.0*   HEMATOCRIT % 32.3* 29.8* 30.0*   PLATELETS 10*3/mm3 256 225 208     Results from last 7 days   Lab Units 07/04/22  0554 07/03/22  0748 07/02/22  0839 06/30/22  1247 06/29/22  0552   INR   --   --   --   --  0.98   APTT seconds 38.8* 73.4* 88.2*   < > 66.8*    < > = values in this interval not displayed.         Results from last 7 days   Lab Units 07/02/22  1149   MAGNESIUM mg/dL 1.8           I reviewed the patient's new clinical results.  I personally viewed and interpreted the patient's EKG/Telemetry data        Medication Review:   acetaminophen, 1,000 mg, Oral, TID  amLODIPine, 2.5 mg, Oral, Q24H  apixaban, 5 mg, Oral, Q12H  aspirin, 81 mg, Oral, Daily  atorvastatin, 40 mg, Oral,  Nightly  docusate sodium, 100 mg, Oral, Daily  gabapentin, 200 mg, Oral, Q8H  metoprolol tartrate, 50 mg, Oral, Q12H  nicotine, 1 patch, Transdermal, Q24H  nystatin, 5 mL, Oral, 4x Daily  pantoprazole, 40 mg, Oral, Q AM  polyethylene glycol, 17 g, Oral, Daily  potassium chloride, 20 mEq, Oral, Daily  sodium chloride, 10 mL, Intravenous, Q12H        lactated ringers, 9 mL/hr, Last Rate: Stopped (06/29/22 1830)        Assessment & Plan       Lung nodule    Cerebral meningioma (HCC)    Paroxysmal atrial fibrillation with rapid ventricular response (HCC)    Emphysema lung (HCC)    GERD (gastroesophageal reflux disease)    History of COVID-19    Hypertension    Stroke-like symptoms    Follow-up exam    Acute posthemorrhagic anemia    Hypopotassemia    1.  Atrial Fibrillation - new, persistent: Heart rate better.  [id number] score of 4 - now on apixiban  2.  PVCs noted on telemetry.  Asymptomatic.  3.  Grade 2 diastolic dysfunction noted on echocardiogram.  4.  Moderate mitral valve regurgitation and mild tricuspid regurgitation.  5.  Hypertension-blood pressure low today.  6.  Right lower lobe lung nodule-s/p robot-assisted wedge resection x2, mediastinal lymphadenectomy and intercostal nerve blocks by Dr. Quinones 6/29. Pathology pending   7.  Anemia.    Does not need asa. See Donnie in 4 weeks.  Continue Eliquis 5 mg twice daily, heart rate is well controlled.  Okay to go home today.      Kristen Lewis MD  07/04/22  10:23 EDT

## 2022-07-05 LAB
BH BB BLOOD EXPIRATION DATE: NORMAL
BH BB BLOOD EXPIRATION DATE: NORMAL
BH BB BLOOD TYPE BARCODE: 5100
BH BB BLOOD TYPE BARCODE: 5100
BH BB DISPENSE STATUS: NORMAL
BH BB DISPENSE STATUS: NORMAL
BH BB PRODUCT CODE: NORMAL
BH BB PRODUCT CODE: NORMAL
BH BB UNIT NUMBER: NORMAL
BH BB UNIT NUMBER: NORMAL
CROSSMATCH INTERPRETATION: NORMAL
CROSSMATCH INTERPRETATION: NORMAL
LAB AP CASE REPORT: NORMAL
LAB AP DIAGNOSIS COMMENT: NORMAL
Lab: NORMAL
PATH REPORT.FINAL DX SPEC: NORMAL
PATH REPORT.GROSS SPEC: NORMAL
UNIT  ABO: NORMAL
UNIT  ABO: NORMAL
UNIT  RH: NORMAL
UNIT  RH: NORMAL

## 2022-07-05 NOTE — OUTREACH NOTE
Prep Survey    Flowsheet Row Responses   [facility] of [facility] in [place] facility patient discharged from? [place]   Is LACE score < 7 ? No   Emergency Room discharge w/ pulse ox? No   Eligibility Readm Mgmt   Discharge diagnosis **Lung nodule    Does the patient have one of the following disease processes/diagnoses(primary or secondary)? Cardiothoracic surgery   Does the patient have Home health ordered? No   Is there a DME ordered? No   Medication alerts for this patient Xarelto    Prep survey completed? Yes          EDWIN DIETRICH - Registered Nurse

## 2022-07-05 NOTE — CASE MANAGEMENT/SOCIAL WORK
Case Management Discharge Note      Final Note: discharged home    Provided Post Acute Provider List?: N/A  N/A Provider List Comment: No needs identified at this time.  Provided Post Acute Provider Quality & Resource List?: N/A    Selected Continued Care - Discharged on 7/4/2022 Admission date: 6/24/2022 - Discharge disposition: Home or Self Care    Destination    No services have been selected for the patient.              Durable Medical Equipment    No services have been selected for the patient.              Dialysis/Infusion    No services have been selected for the patient.              Home Medical Care    No services have been selected for the patient.              Therapy    No services have been selected for the patient.              Community Resources    No services have been selected for the patient.              Community & DME    No services have been selected for the patient.                  Transportation Services  Private: Car    Final Discharge Disposition Code: 01 - home or self-care

## 2022-07-06 ENCOUNTER — READMISSION MANAGEMENT (OUTPATIENT)
Dept: CALL CENTER | Facility: HOSPITAL | Age: 72
End: 2022-07-06

## 2022-07-06 NOTE — OUTREACH NOTE
CT Surgery Week 1 Survey    Flowsheet Row Responses   [facility], [place], operated by [facility] patient discharged from? [place]   Does the patient have one of the following disease processes/diagnoses(primary or secondary)? Cardiothoracic surgery   Week 1 attempt successful? Yes   Call start time 1135   Call end time 1138   Discharge diagnosis **Lung nodule    Medication alerts for this patient [place]    Does the patient have all medications related to this admission filled (includes all antibiotics, pain medications, cardiac medications, etc.) Yes   Is the patient taking all medications as directed (includes completed medication regime)? Yes   Does the patient have a primary care provider?  Yes   Does the patient have an appointment scheduled with their C/T surgeon? No   Comments regarding PCP will see Dr. Keita on 7/12   Nursing Interventions Advised patient to make appointment   Has the patient kept scheduled appointments due by today? N/A   Has home health visited the patient within 72 hours of discharge? N/A   Psychosocial issues? No   Did the patient receive a copy of their discharge instructions? Yes   Nursing interventions Reviewed instructions with patient   What is the patient's perception of their health status since discharge? Improving   Nursing interventions Nurse provided patient education   Is the patient /caregiver able to teach back basic post-op care? Continue use of incentive spirometry at least 1 week post discharge, Practice 'cough and deep breath', Drive as instructed by MD in discharge instructions, Take showers only when approved by MD-sponge bathe until then, No tub bath, swimming, or hot tub until instructed by MD, Keep incision areas clean, dry and protected, Do not remove steri-strips, Lifting as instructed by MD in discharge instructions   Is the patient/caregiver able to teach back signs and symptoms of incisional infection? Fever, Pus or odor from incision, Incisional warmth, Increased redness, swelling  or pain at the incisonal site, Increased drainage or bleeding   Is the patient/caregiver able to teach back the hierarchy of who to call/visit for symptoms/problems? PCP, Specialist, Home health nurse, Urgent Care, ED, 911 Yes   Week 1 call completed? Yes   Wrap up additional comments Doing well, reviewed discharge instructions, she is calling to make a f/u appt with surgeon today.            KLEBER TRIMBLE - Registered Nurse

## 2022-07-11 DIAGNOSIS — R91.1 LUNG NODULE: Primary | ICD-10-CM

## 2022-07-13 ENCOUNTER — HOSPITAL ENCOUNTER (OUTPATIENT)
Dept: GENERAL RADIOLOGY | Facility: HOSPITAL | Age: 72
Discharge: HOME | End: 2022-07-13
Admitting: NURSE PRACTITIONER

## 2022-07-13 ENCOUNTER — OFFICE VISIT (OUTPATIENT)
Dept: SURGERY | Facility: CLINIC | Age: 72
End: 2022-07-13

## 2022-07-13 VITALS
OXYGEN SATURATION: 96 % | WEIGHT: 123 LBS | DIASTOLIC BLOOD PRESSURE: 76 MMHG | HEART RATE: 89 BPM | SYSTOLIC BLOOD PRESSURE: 112 MMHG | HEIGHT: 67 IN | BODY MASS INDEX: 19.3 KG/M2

## 2022-07-13 DIAGNOSIS — Z09 FOLLOW-UP EXAMINATION FOLLOWING SURGERY: ICD-10-CM

## 2022-07-13 DIAGNOSIS — R91.1 LUNG NODULE: Primary | ICD-10-CM

## 2022-07-13 DIAGNOSIS — R91.1 LUNG NODULE: ICD-10-CM

## 2022-07-13 PROCEDURE — 71046 X-RAY EXAM CHEST 2 VIEWS: CPT

## 2022-07-13 PROCEDURE — 99024 POSTOP FOLLOW-UP VISIT: CPT | Performed by: THORACIC SURGERY (CARDIOTHORACIC VASCULAR SURGERY)

## 2022-07-13 NOTE — PROGRESS NOTES
"Chief Complaint  First postoperative visit    Subjective        Darling Morgan presents to [facility] THORACIC SURGERY  History of Present Illness     Ms. Morgan was seen in the office today for her first follow-up visit following a robot-assisted wedge resection x2 the right lower lobe for a lung nodule.  Pathology showed no evidence of malignancy.  There were granulomas and organisms consistent with histoplasma.  Patient did have some problems with atrial fibrillation postoperatively but this seems to have resolved.  She has some chest wall pain but this is resolving as well.  She reports no shortness of breath or wheezing.  She has no cough or hemoptysis.  There has been no fever chills or night sweats    Objective   Vital Signs:  /76 (BP Location: Left arm, Patient Position: Sitting, Cuff Size: Adult)   Pulse 89   Ht 170.2 cm (67.01\")   Wt 55.8 kg (123 lb)   SpO2 96%   BMI 19.26 kg/m²   Estimated body mass index is 19.26 kg/m² as calculated from the following:    Height as of this encounter: 170.2 cm (67.01\").    Weight as of this encounter: 55.8 kg (123 lb).    BMI is within normal parameters. No other follow-up for BMI required.      Physical Exam     Neck: No masses and no adenopathy    Chest: Incisions are clean dry and healing well.  No signs of infection.  No subcutaneous masses or nodules.  Full range of motion in both shoulders.    Pulmonary: Lungs are clear to auscultation with equal breath sounds bilaterally    Cardiac: Regular rate and rhythm.  No murmurs or gallops.  No dependent edema.    Result Review :  The following data was reviewed by: Bharathi Quinones III, MD on 07/13/2022:    Chest x-ray performed today was independently reviewed and compared to previous x-rays.  Postoperative changes on the right.  No significant pneumothorax.  No pleural effusion.  No infiltrates.  Left lung is clear.         Assessment and Plan   Diagnoses and all orders for this visit:    1. Lung " nodule (Primary)  -     XR Chest 2 View; Future    2. Follow-up examination following surgery  -     XR Chest 2 View; Future      Patient is making a good recovery from her surgery.  I have encouraged her to resume driving and increasing her activities back to normal.  We discussed her medications and she will continue the gabapentin and extra strength Tylenol.  She has a follow-up appointment with cardiology on August 5.  She will return to see me in 4 weeks with a chest x-ray.  I will keep you informed of her progress.  Next     I spent 17 minutes caring for Darling on this date of service. This time includes time spent by me in the following activities:preparing for the visit, reviewing tests, performing a medically appropriate examination and/or evaluation , counseling and educating the patient/family/caregiver, ordering medications, tests, or procedures, referring and communicating with other health care professionals , documenting information in the medical record, independently interpreting results and communicating that information with the patient/family/caregiver and care coordination  Follow Up   Return in about 4 weeks (around 8/10/2022) for Recheck.  Patient was given instructions and counseling regarding her condition or for health maintenance advice. Please see specific information pulled into the AVS if appropriate.

## 2022-07-22 DIAGNOSIS — R91.1 LUNG NODULE: Primary | ICD-10-CM

## 2022-07-23 DIAGNOSIS — Z09 FOLLOW-UP EXAMINATION FOLLOWING SURGERY: Primary | ICD-10-CM

## 2022-07-23 RX ORDER — TRAMADOL HYDROCHLORIDE 50 MG/1
50 TABLET ORAL EVERY 6 HOURS PRN
Qty: 30 TABLET | Refills: 0 | Status: SHIPPED | OUTPATIENT
Start: 2022-07-23 | End: 2023-02-10 | Stop reason: ALTCHOICE

## 2022-07-26 DIAGNOSIS — R91.1 LUNG NODULE: Primary | ICD-10-CM

## 2022-07-26 RX ORDER — OXYCODONE HYDROCHLORIDE 5 MG/1
5 TABLET ORAL EVERY 4 HOURS PRN
Qty: 18 TABLET | Refills: 0 | Status: SHIPPED | OUTPATIENT
Start: 2022-07-26 | End: 2023-02-10 | Stop reason: ALTCHOICE

## 2022-08-02 RX ORDER — ATORVASTATIN CALCIUM 40 MG/1
40 TABLET, FILM COATED ORAL NIGHTLY
Qty: 30 TABLET | Refills: 0 | Status: SHIPPED | OUTPATIENT
Start: 2022-08-02 | End: 2022-08-29

## 2022-08-02 RX ORDER — POTASSIUM CHLORIDE 20 MEQ/1
20 TABLET, EXTENDED RELEASE ORAL DAILY
Qty: 30 TABLET | Refills: 0 | Status: SHIPPED | OUTPATIENT
Start: 2022-08-02 | End: 2022-08-30

## 2022-08-02 RX ORDER — AMLODIPINE BESYLATE 2.5 MG/1
2.5 TABLET ORAL
Qty: 30 TABLET | Refills: 0 | Status: SHIPPED | OUTPATIENT
Start: 2022-08-02 | End: 2022-08-29

## 2022-08-02 RX ORDER — METOPROLOL TARTRATE 50 MG/1
50 TABLET, FILM COATED ORAL EVERY 12 HOURS SCHEDULED
Qty: 60 TABLET | Refills: 0 | Status: SHIPPED | OUTPATIENT
Start: 2022-08-02 | End: 2022-08-05 | Stop reason: SDUPTHER

## 2022-08-02 NOTE — TELEPHONE ENCOUNTER
Pt called and LVM stating she needs refills on all of her cardiac meds as she is almost out. She is scheduled to see you this Friday. She has tried to reach out to her PCP and they will not fill them. Ok to fill? //HG

## 2022-08-05 ENCOUNTER — OFFICE VISIT (OUTPATIENT)
Dept: CARDIOLOGY | Facility: CLINIC | Age: 72
End: 2022-08-05

## 2022-08-05 VITALS
HEIGHT: 67 IN | HEART RATE: 51 BPM | OXYGEN SATURATION: 98 % | DIASTOLIC BLOOD PRESSURE: 80 MMHG | WEIGHT: 121.4 LBS | SYSTOLIC BLOOD PRESSURE: 132 MMHG | BODY MASS INDEX: 19.06 KG/M2

## 2022-08-05 DIAGNOSIS — I48.0 PAROXYSMAL ATRIAL FIBRILLATION WITH RAPID VENTRICULAR RESPONSE: Primary | ICD-10-CM

## 2022-08-05 DIAGNOSIS — R91.1 LUNG NODULE: ICD-10-CM

## 2022-08-05 PROCEDURE — 93000 ELECTROCARDIOGRAM COMPLETE: CPT | Performed by: NURSE PRACTITIONER

## 2022-08-05 PROCEDURE — 99214 OFFICE O/P EST MOD 30 MIN: CPT | Performed by: NURSE PRACTITIONER

## 2022-08-05 RX ORDER — METOPROLOL TARTRATE 50 MG/1
50 TABLET, FILM COATED ORAL EVERY 12 HOURS SCHEDULED
Qty: 180 TABLET | Refills: 3 | Status: SHIPPED | OUTPATIENT
Start: 2022-08-05

## 2022-08-05 NOTE — PROGRESS NOTES
CARDIOLOGY        Patient Name: Darling Morgan  :1950  Age: 71 y.o.  Primary Cardiologist: Ketan Fields MD  Encounter Provider:  SUKHJINDER Nina    Date of Service: 22            CHIEF COMPLAINT / REASON FOR OFFICE VISIT     Atrial fibrillation with RVR ([facility] 1 month f/u)      HISTORY OF PRESENT ILLNESS       HPI  Darling Morgan is a 71 y.o. female who presents today for 1 month reevaluation.     Pt has a  history significant for PAF.    Review of medical records reveals that patient hospitalized with discharge date 2022.  Patient was scheduled for a lung nodule resection and was incidentally found to be in atrial fibrillation with RVR.  She was evaluated by cardiology and started on a heparin drip.  Rate was controlled.  She ultimately underwent thoracoscopy with wedge resection on 2022.  She was managed postoperatively by thoracic surgery.  Cardiology recommended initiation of Eliquis however insurance company would not cover this and preferred Xarelto.    Patient reports that she has been doing fairly well since discharge.  She reports that she is asymptomatic with episodes of atrial fibrillation.  She is compliant with metoprolol and Xarelto and denies any adverse effects.  Specifically she denies blood in the stool or urine with Xarelto.  She continues to complain of pain at her wedge resection and side of her chest tubes.  She is following thoracic surgery.  Reports that her breathing is improved as well as her chronic cough.  Patient does note that gabapentin that she is taking for nerve pain at chest tube site is improving pain that she deals with chronically to her lower extremities.    The following portions of the patient's history were reviewed and updated as appropriate: allergies, current medications, past family history, past medical history, past social history, past surgical history and problem list.      VITAL SIGNS     Visit Vitals  /80 (BP Location: Left arm,  "Patient Position: Sitting, Cuff Size: Adult)   Pulse 51   Ht 170.2 cm (67\")   Wt 55.1 kg (121 lb 6.4 oz)   SpO2 98%   BMI 19.01 kg/m²         Wt Readings from Last 3 Encounters:   08/05/22 55.1 kg (121 lb 6.4 oz)   07/13/22 55.8 kg (123 lb)   06/29/22 55.9 kg (123 lb 4.8 oz)     Body mass index is 19.01 kg/m².      REVIEW OF SYSTEMS   Review of Systems   Constitutional: Negative for chills, fever, weight gain and weight loss.   Cardiovascular: Negative for leg swelling.   Respiratory: Negative for cough, snoring and wheezing.    Hematologic/Lymphatic: Negative for bleeding problem. Does not bruise/bleed easily.   Skin: Negative for color change.   Musculoskeletal: Negative for falls, joint pain and myalgias.   Gastrointestinal: Negative for melena.   Genitourinary: Negative for hematuria.   Neurological: Negative for excessive daytime sleepiness.   Psychiatric/Behavioral: Negative for depression. The patient is not nervous/anxious.            PHYSICAL EXAMINATION     Vitals and nursing note reviewed.   Constitutional:       Appearance: Normal appearance. Well-developed.   Eyes:      Conjunctiva/sclera: Conjunctivae normal.   Neck:      Vascular: No carotid bruit.   Pulmonary:      Breath sounds: Normal breath sounds.   Cardiovascular:      Normal rate. Regular rhythm. Normal S1 with normal intensity. Normal S2 with normal intensity.      Murmurs: There is no murmur.      No gallop. No click. No rub.   Edema:     Peripheral edema absent.   Musculoskeletal: Normal range of motion. Skin:     General: Skin is warm and dry.   Neurological:      Mental Status: Alert and oriented to person, place, and time.      GCS: GCS eye subscore is 4. GCS verbal subscore is 5. GCS motor subscore is 6.   Psychiatric:         Speech: Speech normal.         Behavior: Behavior normal.         Thought Content: Thought content normal.         Judgment: Judgment normal.           REVIEWED DATA       ECG 12 Lead    Date/Time: 8/5/2022 12:56 " PM  Performed by: Geena Prasad APRN  Authorized by: Geena Prasad APRN   Comparison: compared with previous ECG from 7/1/2022  Rhythm: sinus rhythm  Rate: bradycardic  BPM: 51  ST Segments: ST segments normal  T Waves: T waves normal  QRS axis: normal  Other: no other findings    Clinical impression: normal ECG            Cardiac Procedures:  1. Cardiac catheterization 5/26/2017.  Left main is normal.  Proximal LAD is normal.  Mid to distal LAD with 20% diffuse stenosis.  Diagonal branches are normal.  Circumflex with 30-40% proximal stenosis.  RCA is large with 10-20% stenosis.  2. Echocardiogram 6/25/2022.  LVEF 56-60%.  Grade 2 diastolic dysfunction.  Normal RV cavity size and systolic function.  Left atrial cavity is mild to moderately dilated.  Cannot rule out bicuspid aortic valve.  Aortic valve is mildly calcified.  Moderate mitral valve regurgitation.  Mild tricuspid valve regurgitation.  Calculated RVSP 41 mmHg.      BUN   Date Value Ref Range Status   07/04/2022 11 8 - 23 mg/dL Final     Creatinine   Date Value Ref Range Status   07/04/2022 0.67 0.57 - 1.00 mg/dL Final     Potassium   Date Value Ref Range Status   07/04/2022 3.8 3.5 - 5.2 mmol/L Final     ALT (SGPT)   Date Value Ref Range Status   06/25/2022 17 1 - 33 U/L Final     AST (SGOT)   Date Value Ref Range Status   06/25/2022 18 1 - 32 U/L Final           ASSESSMENT & PLAN     Diagnoses and all orders for this visit:    1. Paroxysmal atrial fibrillation with rapid ventricular response (HCC) (Primary)  · NSR on ECG  · Denies angina or dyspnea  · Continue metoprolol tartrate 50 mg twice daily  · Anticoagulated with Xarelto, denies bleeding complications  -     ECG 12 Lead    2. Lung nodule  · S/p resection    Other orders  -     metoprolol tartrate (LOPRESSOR) 50 MG tablet; Take 1 tablet by mouth Every 12 (Twelve) Hours.  Dispense: 180 tablet; Refill: 3  -     rivaroxaban (Xarelto) 20 MG tablet; Take 1 tablet by mouth Daily With Dinner.  Indications: Atrial Fibrillation  Dispense: 90 tablet; Refill: 3          Return in about 6 months (around 2/5/2023) for Dr. Fields- Routine.     Future Appointments       Provider Department Center    8/17/2022 10:00 AM Bharathi Quinones III, MD [facility] THORACIC SURGERY [facility]    2/10/2023 1:40 PM Ketan Fields MD [facility] CARDIOLOGY [facility]                MEDICATIONS         Discharge Medications          Accurate as of August 5, 2022  1:58 PM. If you have any questions, ask your nurse or doctor.            Continue These Medications      Instructions Start Date   amLODIPine 2.5 MG tablet  Commonly known as: NORVASC   2.5 mg, Oral, Every 24 Hours Scheduled      atorvastatin 40 MG tablet  Commonly known as: LIPITOR   40 mg, Oral, Nightly      gabapentin 100 MG capsule  Commonly known as: NEURONTIN   200 mg, Oral, Every 8 Hours Scheduled      metoprolol tartrate 50 MG tablet  Commonly known as: LOPRESSOR   50 mg, Oral, Every 12 Hours Scheduled      omeprazole 20 MG capsule  Commonly known as: priLOSEC   40 mg, Oral, Daily, TAKES TWO CAPSULE BY MOUTH DAILY       oxyCODONE 5 MG immediate release tablet  Commonly known as: Roxicodone   5 mg, Oral, Every 4 Hours PRN      potassium chloride 20 MEQ CR tablet  Commonly known as: K-DUR,KLOR-CON   20 mEq, Oral, Daily      rivaroxaban 20 MG tablet  Commonly known as: Xarelto   20 mg, Oral, Daily With Dinner      traMADol 50 MG tablet  Commonly known as: Ultram   50 mg, Oral, Every 6 Hours PRN         Stop These Medications    amphetamine-dextroamphetamine 20 MG tablet  Commonly known as: ADDERALL  Stopped by: SUKHJINDER Nina     nicotine 21 MG/24HR patch  Commonly known as: NICODERM CQ  Stopped by: SUKHJINDER Nina     polyethylene glycol 17 g packet  Commonly known as: MIRALAX  Stopped by: SUKHJINDER Nina                **Dragon Disclaimer:   Much of this encounter note is an electronic transcription/translation of  spoken language to printed text. The electronic translation of spoken language may permit erroneous, or at times, nonsensical words or phrases to be inadvertently transcribed. Although I have reviewed the note for such errors, some may still exist.

## 2022-08-17 ENCOUNTER — OFFICE VISIT (OUTPATIENT)
Dept: SURGERY | Facility: CLINIC | Age: 72
End: 2022-08-17

## 2022-08-17 ENCOUNTER — HOSPITAL ENCOUNTER (OUTPATIENT)
Dept: GENERAL RADIOLOGY | Facility: HOSPITAL | Age: 72
Discharge: HOME | End: 2022-08-17
Admitting: THORACIC SURGERY (CARDIOTHORACIC VASCULAR SURGERY)

## 2022-08-17 VITALS
HEIGHT: 67 IN | OXYGEN SATURATION: 96 % | WEIGHT: 121 LBS | SYSTOLIC BLOOD PRESSURE: 126 MMHG | BODY MASS INDEX: 18.99 KG/M2 | DIASTOLIC BLOOD PRESSURE: 78 MMHG | HEART RATE: 56 BPM

## 2022-08-17 DIAGNOSIS — R91.1 LUNG NODULE: ICD-10-CM

## 2022-08-17 DIAGNOSIS — Z09 FOLLOW-UP EXAMINATION FOLLOWING SURGERY: ICD-10-CM

## 2022-08-17 DIAGNOSIS — R91.1 LUNG NODULE: Primary | ICD-10-CM

## 2022-08-17 PROCEDURE — 71046 X-RAY EXAM CHEST 2 VIEWS: CPT

## 2022-08-17 PROCEDURE — 99024 POSTOP FOLLOW-UP VISIT: CPT | Performed by: THORACIC SURGERY (CARDIOTHORACIC VASCULAR SURGERY)

## 2022-08-17 RX ORDER — GABAPENTIN 100 MG/1
200 CAPSULE ORAL EVERY 8 HOURS SCHEDULED
Qty: 90 CAPSULE | Refills: 0 | Status: SHIPPED | OUTPATIENT
Start: 2022-08-17 | End: 2023-02-10 | Stop reason: ALTCHOICE

## 2022-08-17 NOTE — PROGRESS NOTES
"Chief Complaint  Second postoperative visit    Subjective        Darling Morgan presents to [facility] THORACIC SURGERY  History of Present Illness     Ms. Morgan was seen in the office today for further follow-up of a robot-assisted wedge resection of the right lower lobe.  Pathology showed no evidence of malignancy.  Granulomas and organisms were consistent with a histoplasma.  Patient has been doing well.  Continues to have numbness and burning pain along the ribs radiating to the midline.  Reports no shortness of breath.  Has no cough or hemoptysis.  Has no pleuritic pain.  Has resumed most all of her activities.  Has asked for refill on gabapentin since it was helping her chest wall pain.    Objective   Vital Signs:  /78 (BP Location: Left arm, Patient Position: Sitting, Cuff Size: Adult)   Pulse 56   Ht 170.2 cm (67\")   Wt 54.9 kg (121 lb)   SpO2 96%   BMI 18.95 kg/m²   Estimated body mass index is 18.95 kg/m² as calculated from the following:    Height as of this encounter: 170.2 cm (67\").    Weight as of this encounter: 54.9 kg (121 lb).    BMI is within normal parameters. No other follow-up for BMI required.      Physical Exam     Neck: No masses no adenopathy.  Full range of motion.    Chest: Incisions are well-healed.  No subcutaneous masses or nodules.  Chest wall is stable.  Full range of motion in both shoulders.    Pulmonary: Lungs are clear to auscultation with equal breath sounds bilaterally    Cardiac: Regular rate and rhythm.  No murmurs or gallops.  No dependent edema.    Result Review :  The following data was reviewed by: Bharathi Quinones III, MD on 08/17/2022:    Chest x-ray performed today was independently reviewed and compared to previous x-rays.  Both lungs are fully expanded.  No pneumothorax.  No pulmonary infiltrates.  No pleural effusion.           Assessment and Plan   Diagnoses and all orders for this visit:    1. Lung nodule (Primary)  -     gabapentin " (NEURONTIN) 100 MG capsule; Take 2 capsules by mouth Every 8 (Eight) Hours.  Dispense: 90 capsule; Refill: 0  -     CT Chest Without Contrast; Future    2. Follow-up examination following surgery  -     CT Chest Without Contrast; Future    Patient is doing well.  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  we will restart gabapentin.  Patient will return to see me in 3 months with a CT of the chest without contrast.  I will keep you informed of her progress.       I spent 15 minutes caring for Darling on this date of service. This time includes time spent by me in the following activities:preparing for the visit, reviewing tests, performing a medically appropriate examination and/or evaluation , counseling and educating the patient/family/caregiver, ordering medications, tests, or procedures, referring and communicating with other health care professionals , documenting information in the medical record, independently interpreting results and communicating that information with the patient/family/caregiver and care coordination  Follow Up   Return in about 3 months (around 11/17/2022) for Recheck.  Patient was given instructions and counseling regarding her condition or for health maintenance advice. Please see specific information pulled into the AVS if appropriate.

## 2022-08-29 RX ORDER — ATORVASTATIN CALCIUM 40 MG/1
40 TABLET, FILM COATED ORAL NIGHTLY
Qty: 90 TABLET | Refills: 3 | Status: SHIPPED | OUTPATIENT
Start: 2022-08-29

## 2022-08-29 RX ORDER — AMLODIPINE BESYLATE 2.5 MG/1
2.5 TABLET ORAL
Qty: 90 TABLET | Refills: 3 | Status: SHIPPED | OUTPATIENT
Start: 2022-08-29

## 2022-08-30 RX ORDER — POTASSIUM CHLORIDE 20 MEQ/1
20 TABLET, EXTENDED RELEASE ORAL DAILY
Qty: 90 TABLET | Refills: 1 | Status: SHIPPED | OUTPATIENT
Start: 2022-08-30 | End: 2022-12-09 | Stop reason: SDUPTHER

## 2022-11-19 ENCOUNTER — APPOINTMENT (OUTPATIENT)
Dept: CT IMAGING | Facility: HOSPITAL | Age: 72
End: 2022-11-19

## 2022-12-06 ENCOUNTER — OFFICE VISIT (OUTPATIENT)
Dept: SURGERY | Facility: CLINIC | Age: 72
End: 2022-12-06

## 2022-12-06 VITALS
BODY MASS INDEX: 19.27 KG/M2 | SYSTOLIC BLOOD PRESSURE: 142 MMHG | DIASTOLIC BLOOD PRESSURE: 82 MMHG | HEART RATE: 79 BPM | OXYGEN SATURATION: 96 % | WEIGHT: 122.8 LBS | HEIGHT: 67 IN

## 2022-12-06 DIAGNOSIS — R91.1 LUNG NODULE: Primary | ICD-10-CM

## 2022-12-06 DIAGNOSIS — Z09 FOLLOW-UP EXAMINATION FOLLOWING SURGERY: ICD-10-CM

## 2022-12-06 PROCEDURE — 99213 OFFICE O/P EST LOW 20 MIN: CPT | Performed by: THORACIC SURGERY (CARDIOTHORACIC VASCULAR SURGERY)

## 2022-12-06 RX ORDER — ZOLPIDEM TARTRATE 10 MG/1
TABLET ORAL
COMMUNITY
Start: 2022-10-24

## 2022-12-07 NOTE — PROGRESS NOTES
"Chief Complaint  Postop follow-up    Subjective        Darling Morgan presents to [facility] THORACIC SURGERY  History of Present Illness     And Cathy was seen in the office today for further follow-up of a right VAT with robot-assisted wedge resection of the right lower lobe lung nodule performed June 29, 2022.  Final pathology showed this to be granulomatous inflammation.  Noncaseating granulomas were identified.  GMS staining showed few degenerating yeast morphologically consistent with histoplasma.  No malignancy was identified in the lung or the left note.    Patient is doing well.  Intercostal nerve neuralgia is almost resolved.  She denies shortness of breath.  She continues to smoke.  She reports no shortness of breath or wheezing.  She has a cough early in the morning but no hemoptysis.    Objective   Vital Signs:  /82 (BP Location: Left arm, Patient Position: Sitting, Cuff Size: Adult)   Pulse 79   Ht 170.2 cm (67\")   Wt 55.7 kg (122 lb 12.8 oz)   SpO2 96%   BMI 19.23 kg/m²   Estimated body mass index is 19.23 kg/m² as calculated from the following:    Height as of this encounter: 170.2 cm (67\").    Weight as of this encounter: 55.7 kg (122 lb 12.8 oz).    BMI is within normal parameters. No other follow-up for BMI required.      Physical Exam     Neck: No masses and no adenopathy    Chest: Incisions are well-healed.  No subcutaneous masses or nodules.  Chest wall stable.    Pulmonary: Lungs are clear to auscultation with equal breath sounds bilaterally    Cardiac: Regular rate and rhythm no murmurs or gallops no dependent edema.    Result Review :  The following data was reviewed by: Bharathi Quinones III, MD on 12/06/2022:    CT scan of the chest performed November 28, 2022 was independently reviewed and compared to previous x-rays.  Postoperative changes in the right lower lobe with the visualized staple line.  Some nodularity near the staple line.  No pulmonary consolidation. "  No pleural effusion.  No pneumothorax.  No suspicious hilar or mediastinal adenopathy.         Assessment and Plan   Diagnoses and all orders for this visit:    1. Lung nodule (Primary)  -     CT Chest Without Contrast; Future    2. Follow-up examination following surgery      Biopsies showed no evidence of malignancy.  There were granulomas and suggestion of histoplasma.  These nodules may be related to granulomatous disease.  I think it is worthwhile to observing this closely for now.  I recommended a follow-up CT of the chest in 3 months.  Patient will return to our office with a CT of the chest without contrast in 3 months for further evaluation.  We will keep you informed of her progress.       I spent 25 minutes caring for Darling on this date of service. This time includes time spent by me in the following activities:preparing for the visit, reviewing tests, performing a medically appropriate examination and/or evaluation , counseling and educating the patient/family/caregiver, ordering medications, tests, or procedures, referring and communicating with other health care professionals , documenting information in the medical record, independently interpreting results and communicating that information with the patient/family/caregiver and care coordination  Follow Up   Return in about 3 months (around 3/6/2023) for Recheck.  Patient was given instructions and counseling regarding her condition or for health maintenance advice. Please see specific information pulled into the AVS if appropriate.

## 2022-12-09 RX ORDER — POTASSIUM CHLORIDE 20 MEQ/1
20 TABLET, EXTENDED RELEASE ORAL DAILY
Qty: 90 TABLET | Refills: 3 | Status: SHIPPED | OUTPATIENT
Start: 2022-12-09

## 2023-02-10 ENCOUNTER — OFFICE VISIT (OUTPATIENT)
Dept: CARDIOLOGY | Facility: CLINIC | Age: 73
End: 2023-02-10
Payer: MEDICARE

## 2023-02-10 VITALS
SYSTOLIC BLOOD PRESSURE: 148 MMHG | DIASTOLIC BLOOD PRESSURE: 94 MMHG | HEIGHT: 67 IN | BODY MASS INDEX: 18.52 KG/M2 | WEIGHT: 118 LBS | HEART RATE: 70 BPM

## 2023-02-10 DIAGNOSIS — I48.0 PAROXYSMAL ATRIAL FIBRILLATION WITH RAPID VENTRICULAR RESPONSE: Primary | ICD-10-CM

## 2023-02-10 DIAGNOSIS — I10 PRIMARY HYPERTENSION: ICD-10-CM

## 2023-02-10 PROCEDURE — 99214 OFFICE O/P EST MOD 30 MIN: CPT | Performed by: INTERNAL MEDICINE

## 2023-02-10 NOTE — PROGRESS NOTES
"      CARDIOLOGY    Ketan Fields MD    ENCOUNTER DATE:  02/10/2023    Darling Morgan / 72 y.o. / female        CHIEF COMPLAINT / REASON FOR OFFICE VISIT     Paroxysmal atrial fibrillation  and Follow-up (6 month)  Hypertension    HISTORY OF PRESENT ILLNESS       HPI  Darling Morgan is a 72 y.o. female who presents today for reevaluation.  Patient is having recurrent episodes of atrial fibrillation.  She says it happens about 1 or 2 times a week.  She says when she has it she just feels very drained she feels weak and dizzy.      The following portions of the patient's history were reviewed and updated as appropriate: allergies, current medications, past family history, past medical history, past social history, past surgical history and problem list.      VITAL SIGNS     Visit Vitals  /94   Pulse 70   Ht 170.2 cm (67\")   Wt 53.5 kg (118 lb)   BMI 18.48 kg/m²         Wt Readings from Last 3 Encounters:   02/10/23 53.5 kg (118 lb)   12/06/22 55.7 kg (122 lb 12.8 oz)   08/17/22 54.9 kg (121 lb)     Body mass index is 18.48 kg/m².      REVIEW OF SYSTEMS   ROS        PHYSICAL EXAMINATION     Vitals reviewed.   Constitutional:       Appearance: Healthy appearance.   Pulmonary:      Effort: Pulmonary effort is normal.   Cardiovascular:      Normal rate. Regular rhythm. Normal S1. Normal S2.      Murmurs: There is no murmur.      No gallop. No click. No rub.   Pulses:     Intact distal pulses.   Edema:     Peripheral edema absent.   Neurological:      Mental Status: Alert and oriented to person, place and time.           REVIEWED DATA     Procedures    Cardiac Procedures:  1.           ASSESSMENT & PLAN      Diagnosis Plan   1. Paroxysmal atrial fibrillation with rapid ventricular response (HCC)        2. Primary hypertension              SUMMARY/DISCUSSION  1. Hypertension blood pressure little bit elevated today.  See problem #2.  2. Atrial fibrillation.  Unfortunately she continues to have recurrent episodes of " atrial fibrillation.  I am going to increase her beta-blocker both to see if we can control her atrial fibrillation as well as improve her blood pressure.  We will have her come back in about 3 months for reassessment.  In the meantime I told her if these does not improve her symptoms to contact my office I will going to refer her to EP.  Her  had an ablation in the past so she was probably aware of the procedure.  We will still try to control this with medicine for she is taking her anticoagulant        MEDICATIONS         Discharge Medications          Accurate as of February 10, 2023  2:25 PM. If you have any questions, ask your nurse or doctor.            Continue These Medications      Instructions Start Date   amLODIPine 2.5 MG tablet  Commonly known as: NORVASC   2.5 mg, Oral, Every 24 Hours Scheduled      atorvastatin 40 MG tablet  Commonly known as: LIPITOR   40 mg, Oral, Nightly      metoprolol tartrate 50 MG tablet  Commonly known as: LOPRESSOR   50 mg, Oral, Every 12 Hours Scheduled      omeprazole 20 MG capsule  Commonly known as: priLOSEC   40 mg, Oral, Daily, TAKES TWO CAPSULE BY MOUTH DAILY       potassium chloride 20 MEQ CR tablet  Commonly known as: K-DUR,KLOR-CON   20 mEq, Oral, Daily      rivaroxaban 20 MG tablet  Commonly known as: Xarelto   20 mg, Oral, Daily With Dinner      zolpidem 10 MG tablet  Commonly known as: AMBIEN   No dose, route, or frequency recorded.                 **Dragon Disclaimer:   Much of this encounter note is an electronic transcription/translation of spoken language to printed text. The electronic translation of spoken language may permit erroneous, or at times, nonsensical words or phrases to be inadvertently transcribed. Although I have reviewed the note for such errors, some may still exist.

## 2023-03-02 ENCOUNTER — TELEPHONE (OUTPATIENT)
Dept: SURGERY | Facility: CLINIC | Age: 73
End: 2023-03-02
Payer: MEDICARE

## 2023-03-08 ENCOUNTER — PREP FOR SURGERY (OUTPATIENT)
Dept: OTHER | Facility: HOSPITAL | Age: 73
End: 2023-03-08
Payer: MEDICARE

## 2023-03-08 ENCOUNTER — OFFICE VISIT (OUTPATIENT)
Dept: SURGERY | Facility: CLINIC | Age: 73
End: 2023-03-08
Payer: MEDICARE

## 2023-03-08 VITALS
HEART RATE: 60 BPM | HEIGHT: 67 IN | WEIGHT: 120 LBS | OXYGEN SATURATION: 99 % | SYSTOLIC BLOOD PRESSURE: 138 MMHG | DIASTOLIC BLOOD PRESSURE: 70 MMHG | BODY MASS INDEX: 18.83 KG/M2

## 2023-03-08 DIAGNOSIS — R91.1 LUNG NODULE: Primary | ICD-10-CM

## 2023-03-08 PROCEDURE — 99214 OFFICE O/P EST MOD 30 MIN: CPT | Performed by: STUDENT IN AN ORGANIZED HEALTH CARE EDUCATION/TRAINING PROGRAM

## 2023-03-08 NOTE — PROGRESS NOTES
"Chief Complaint  Enlarging right lower lobe pulmonary nodule  Subjective        Darling Morgan presents to [facility] THORACIC SURGERY  History of Present Illness  Ms. Morgan is a 72-year-old lady and previous patient of Dr. Quinones who presents today for evaluation of a right lower lobe pulmonary nodule.  She previously had a right VATS with a robotic assisted wedge resection of the lower lobe lung nodule performed June 29, 2022.  Final pathology showed this to be granulomatous inflammation.  In the postoperative surveillance she was noted to have an additional right lower lobe pulmonary nodule.  The last time she was seen 3 months ago we were following this with serial surveillance.  She presents today to discuss these findings and plans moving forward.  She denies any symptoms.  She continues to smoke she smokes approximately half pack per day.    Objective   Vital Signs:  /70 (BP Location: Left arm, Patient Position: Sitting, Cuff Size: Adult)   Pulse 60   Ht 170.2 cm (67.01\")   Wt 54.4 kg (120 lb)   SpO2 99%   BMI 18.79 kg/m²   Estimated body mass index is 18.79 kg/m² as calculated from the following:    Height as of this encounter: 170.2 cm (67.01\").    Weight as of this encounter: 54.4 kg (120 lb).       BMI is within normal parameters. No other follow-up for BMI required.      Physical Exam  Vitals reviewed.   Constitutional:       Appearance: Normal appearance.   HENT:      Head: Normocephalic.   Cardiovascular:      Rate and Rhythm: Normal rate and regular rhythm.      Pulses: Normal pulses.      Heart sounds: Normal heart sounds.   Pulmonary:      Effort: Pulmonary effort is normal.      Breath sounds: Normal breath sounds.   Musculoskeletal:         General: Normal range of motion.   Skin:     General: Skin is warm and dry.      Capillary Refill: Capillary refill takes less than 2 seconds.   Neurological:      General: No focal deficit present.      Mental Status: She is alert and " oriented to person, place, and time.   Psychiatric:         Mood and Affect: Mood normal.         Behavior: Behavior normal.         Thought Content: Thought content normal.         Judgment: Judgment normal.        Result Review :  Chest CT scan which she brought in via disc was reviewed and visualized by myself.  The official report is in the [facility] system, the interpretation agrees the right lower lobe lesion in question has enlarged in size since the last image           Assessment and Plan   Diagnoses and all orders for this visit:    1. Lung nodule (Primary)    Ms. Morgan is a pleasant 72-year-old lady who presents today for evaluation of a right lower lobe pulmonary nodule.  This nodule has slightly enlarged in size over the last 12 months.  Due to her high risk, and continued smoking nature and enlargement of this nodule, we will order a CT-guided biopsy of this lesion to help guide diagnosis and possibly rule out malignancy.       I spent 30 minutes caring for Darling on this date of service. This time includes time spent by me in the following activities:preparing for the visit, reviewing tests, obtaining and/or reviewing a separately obtained history, performing a medically appropriate examination and/or evaluation , counseling and educating the patient/family/caregiver, ordering medications, tests, or procedures, referring and communicating with other health care professionals , documenting information in the medical record, independently interpreting results and communicating that information with the patient/family/caregiver and care coordination  Follow Up   No follow-ups on file.  Patient was given instructions and counseling regarding her condition or for health maintenance advice. Please see specific information pulled into the AVS if appropriate.

## 2023-03-08 NOTE — H&P (VIEW-ONLY)
"Chief Complaint  Enlarging right lower lobe pulmonary nodule  Subjective        Darling Morgan presents to [facility] THORACIC SURGERY  History of Present Illness  Ms. Morgan is a 72-year-old lady and previous patient of Dr. Quinones who presents today for evaluation of a right lower lobe pulmonary nodule.  She previously had a right VATS with a robotic assisted wedge resection of the lower lobe lung nodule performed June 29, 2022.  Final pathology showed this to be granulomatous inflammation.  In the postoperative surveillance she was noted to have an additional right lower lobe pulmonary nodule.  The last time she was seen 3 months ago we were following this with serial surveillance.  She presents today to discuss these findings and plans moving forward.  She denies any symptoms.  She continues to smoke she smokes approximately half pack per day.    Objective   Vital Signs:  /70 (BP Location: Left arm, Patient Position: Sitting, Cuff Size: Adult)   Pulse 60   Ht 170.2 cm (67.01\")   Wt 54.4 kg (120 lb)   SpO2 99%   BMI 18.79 kg/m²   Estimated body mass index is 18.79 kg/m² as calculated from the following:    Height as of this encounter: 170.2 cm (67.01\").    Weight as of this encounter: 54.4 kg (120 lb).       BMI is within normal parameters. No other follow-up for BMI required.      Physical Exam  Vitals reviewed.   Constitutional:       Appearance: Normal appearance.   HENT:      Head: Normocephalic.   Cardiovascular:      Rate and Rhythm: Normal rate and regular rhythm.      Pulses: Normal pulses.      Heart sounds: Normal heart sounds.   Pulmonary:      Effort: Pulmonary effort is normal.      Breath sounds: Normal breath sounds.   Musculoskeletal:         General: Normal range of motion.   Skin:     General: Skin is warm and dry.      Capillary Refill: Capillary refill takes less than 2 seconds.   Neurological:      General: No focal deficit present.      Mental Status: She is alert and " oriented to person, place, and time.   Psychiatric:         Mood and Affect: Mood normal.         Behavior: Behavior normal.         Thought Content: Thought content normal.         Judgment: Judgment normal.        Result Review :  Chest CT scan which she brought in via disc was reviewed and visualized by myself.  The official report is in the [facility] system, the interpretation agrees the right lower lobe lesion in question has enlarged in size since the last image           Assessment and Plan   Diagnoses and all orders for this visit:    1. Lung nodule (Primary)    Ms. Morgan is a pleasant 72-year-old lady who presents today for evaluation of a right lower lobe pulmonary nodule.  This nodule has slightly enlarged in size over the last 12 months.  Due to her high risk, and continued smoking nature and enlargement of this nodule, we will order a CT-guided biopsy of this lesion to help guide diagnosis and possibly rule out malignancy.       I spent 30 minutes caring for Darling on this date of service. This time includes time spent by me in the following activities:preparing for the visit, reviewing tests, obtaining and/or reviewing a separately obtained history, performing a medically appropriate examination and/or evaluation , counseling and educating the patient/family/caregiver, ordering medications, tests, or procedures, referring and communicating with other health care professionals , documenting information in the medical record, independently interpreting results and communicating that information with the patient/family/caregiver and care coordination  Follow Up   No follow-ups on file.  Patient was given instructions and counseling regarding her condition or for health maintenance advice. Please see specific information pulled into the AVS if appropriate.

## 2023-03-09 NOTE — PROGRESS NOTES
03/22/23 0001   Pre-Procedure Phone Call   Procedure Time Verified Yes   Arrival Time 0830   Procedure Location Verified Yes   Medical History Reviewed No   NPO Status Reinforced Yes   Ride and Caregiver Arranged Yes   Patient Knows to Bring Current Medications   (No changes in medications since last reviewed)   Bring Outside Films Requested   (CT Chest 3/2/23 @ [facility]. PEF sent)

## 2023-03-14 RX ORDER — BUDESONIDE AND FORMOTEROL FUMARATE DIHYDRATE 80; 4.5 UG/1; UG/1
2 AEROSOL RESPIRATORY (INHALATION)
Qty: 10.2 G | Refills: 12 | Status: SHIPPED | OUTPATIENT
Start: 2023-03-14 | End: 2023-03-22

## 2023-03-22 ENCOUNTER — HOSPITAL ENCOUNTER (OUTPATIENT)
Dept: GENERAL RADIOLOGY | Facility: HOSPITAL | Age: 73
Discharge: HOME | End: 2023-03-22
Payer: MEDICARE

## 2023-03-22 ENCOUNTER — HOSPITAL ENCOUNTER (OUTPATIENT)
Dept: CT IMAGING | Facility: HOSPITAL | Age: 73
Discharge: HOME | End: 2023-03-22
Payer: MEDICARE

## 2023-03-22 VITALS
RESPIRATION RATE: 12 BRPM | WEIGHT: 120 LBS | TEMPERATURE: 97.8 F | HEART RATE: 54 BPM | SYSTOLIC BLOOD PRESSURE: 104 MMHG | DIASTOLIC BLOOD PRESSURE: 55 MMHG | HEIGHT: 67 IN | OXYGEN SATURATION: 95 % | BODY MASS INDEX: 18.83 KG/M2

## 2023-03-22 DIAGNOSIS — R91.1 LUNG NODULE: ICD-10-CM

## 2023-03-22 LAB
BASOPHILS # BLD AUTO: 0.03 10*3/MM3 (ref 0–0.2)
BASOPHILS NFR BLD AUTO: 0.3 % (ref 0–1.5)
DEPRECATED RDW RBC AUTO: 50.3 FL (ref 37–54)
EOSINOPHIL # BLD AUTO: 0.21 10*3/MM3 (ref 0–0.4)
EOSINOPHIL NFR BLD AUTO: 2.3 % (ref 0.3–6.2)
ERYTHROCYTE [DISTWIDTH] IN BLOOD BY AUTOMATED COUNT: 14.3 % (ref 12.3–15.4)
HCT VFR BLD AUTO: 40.1 % (ref 34–46.6)
HGB BLD-MCNC: 13 G/DL (ref 12–15.9)
IMM GRANULOCYTES # BLD AUTO: 0.03 10*3/MM3 (ref 0–0.05)
IMM GRANULOCYTES NFR BLD AUTO: 0.3 % (ref 0–0.5)
INR PPP: 1.3 (ref 0.8–1.2)
LYMPHOCYTES # BLD AUTO: 2.78 10*3/MM3 (ref 0.7–3.1)
LYMPHOCYTES NFR BLD AUTO: 30.5 % (ref 19.6–45.3)
MCH RBC QN AUTO: 30.8 PG (ref 26.6–33)
MCHC RBC AUTO-ENTMCNC: 32.4 G/DL (ref 31.5–35.7)
MCV RBC AUTO: 95 FL (ref 79–97)
MONOCYTES # BLD AUTO: 0.64 10*3/MM3 (ref 0.1–0.9)
MONOCYTES NFR BLD AUTO: 7 % (ref 5–12)
NEUTROPHILS NFR BLD AUTO: 5.43 10*3/MM3 (ref 1.7–7)
NEUTROPHILS NFR BLD AUTO: 59.6 % (ref 42.7–76)
NRBC BLD AUTO-RTO: 0 /100 WBC (ref 0–0.2)
PLATELET # BLD AUTO: 267 10*3/MM3 (ref 140–450)
PLATELET # BLD AUTO: 277 10*3/MM3 (ref 140–450)
PMV BLD AUTO: 9.6 FL (ref 6–12)
PROTHROMBIN TIME: 15.5 SECONDS (ref 12.8–15.2)
RBC # BLD AUTO: 4.22 10*6/MM3 (ref 3.77–5.28)
WBC NRBC COR # BLD: 9.12 10*3/MM3 (ref 3.4–10.8)

## 2023-03-22 PROCEDURE — 25010000002 FENTANYL CITRATE (PF) 50 MCG/ML SOLUTION: Performed by: RADIOLOGY

## 2023-03-22 PROCEDURE — 88341 IMHCHEM/IMCYTCHM EA ADD ANTB: CPT | Performed by: STUDENT IN AN ORGANIZED HEALTH CARE EDUCATION/TRAINING PROGRAM

## 2023-03-22 PROCEDURE — 88342 IMHCHEM/IMCYTCHM 1ST ANTB: CPT | Performed by: STUDENT IN AN ORGANIZED HEALTH CARE EDUCATION/TRAINING PROGRAM

## 2023-03-22 PROCEDURE — 88305 TISSUE EXAM BY PATHOLOGIST: CPT | Performed by: STUDENT IN AN ORGANIZED HEALTH CARE EDUCATION/TRAINING PROGRAM

## 2023-03-22 PROCEDURE — 71045 X-RAY EXAM CHEST 1 VIEW: CPT

## 2023-03-22 PROCEDURE — 88360 TUMOR IMMUNOHISTOCHEM/MANUAL: CPT

## 2023-03-22 PROCEDURE — 85610 PROTHROMBIN TIME: CPT

## 2023-03-22 PROCEDURE — 0 LIDOCAINE 1 % SOLUTION: Performed by: RADIOLOGY

## 2023-03-22 PROCEDURE — 25010000002 MIDAZOLAM PER 1 MG: Performed by: RADIOLOGY

## 2023-03-22 PROCEDURE — 99152 MOD SED SAME PHYS/QHP 5/>YRS: CPT

## 2023-03-22 PROCEDURE — 85025 COMPLETE CBC W/AUTO DIFF WBC: CPT | Performed by: RADIOLOGY

## 2023-03-22 RX ORDER — MIDAZOLAM HYDROCHLORIDE 1 MG/ML
INJECTION INTRAMUSCULAR; INTRAVENOUS AS NEEDED
Status: DISCONTINUED | OUTPATIENT
Start: 2023-03-22 | End: 2023-03-23 | Stop reason: HOSPADM

## 2023-03-22 RX ORDER — FENTANYL CITRATE 50 UG/ML
INJECTION, SOLUTION INTRAMUSCULAR; INTRAVENOUS AS NEEDED
Status: COMPLETED | OUTPATIENT
Start: 2023-03-22 | End: 2023-03-22

## 2023-03-22 RX ORDER — LIDOCAINE HYDROCHLORIDE 10 MG/ML
20 INJECTION, SOLUTION INFILTRATION; PERINEURAL ONCE
Status: COMPLETED | OUTPATIENT
Start: 2023-03-22 | End: 2023-03-22

## 2023-03-22 RX ORDER — SODIUM CHLORIDE 9 MG/ML
40 INJECTION, SOLUTION INTRAVENOUS AS NEEDED
Status: DISCONTINUED | OUTPATIENT
Start: 2023-03-22 | End: 2023-03-23 | Stop reason: HOSPADM

## 2023-03-22 RX ORDER — SODIUM CHLORIDE 0.9 % (FLUSH) 0.9 %
3 SYRINGE (ML) INJECTION EVERY 12 HOURS SCHEDULED
Status: DISCONTINUED | OUTPATIENT
Start: 2023-03-22 | End: 2023-03-23 | Stop reason: HOSPADM

## 2023-03-22 RX ORDER — SODIUM CHLORIDE 9 MG/ML
25 INJECTION, SOLUTION INTRAVENOUS ONCE
Status: COMPLETED | OUTPATIENT
Start: 2023-03-22 | End: 2023-03-22

## 2023-03-22 RX ORDER — SODIUM CHLORIDE 0.9 % (FLUSH) 0.9 %
10 SYRINGE (ML) INJECTION AS NEEDED
Status: DISCONTINUED | OUTPATIENT
Start: 2023-03-22 | End: 2023-03-23 | Stop reason: HOSPADM

## 2023-03-22 RX ORDER — MIDAZOLAM HYDROCHLORIDE 1 MG/ML
INJECTION INTRAMUSCULAR; INTRAVENOUS AS NEEDED
Status: COMPLETED | OUTPATIENT
Start: 2023-03-22 | End: 2023-03-22

## 2023-03-22 RX ORDER — FENTANYL CITRATE 50 UG/ML
INJECTION, SOLUTION INTRAMUSCULAR; INTRAVENOUS AS NEEDED
Status: DISCONTINUED | OUTPATIENT
Start: 2023-03-22 | End: 2023-03-23 | Stop reason: HOSPADM

## 2023-03-22 RX ADMIN — MIDAZOLAM 1 MG: 1 INJECTION INTRAMUSCULAR; INTRAVENOUS at 11:05

## 2023-03-22 RX ADMIN — FENTANYL CITRATE 25 MCG: 50 INJECTION, SOLUTION INTRAMUSCULAR; INTRAVENOUS at 11:12

## 2023-03-22 RX ADMIN — FENTANYL CITRATE 25 MCG: 50 INJECTION, SOLUTION INTRAMUSCULAR; INTRAVENOUS at 11:08

## 2023-03-22 RX ADMIN — FENTANYL CITRATE 25 MCG: 50 INJECTION, SOLUTION INTRAMUSCULAR; INTRAVENOUS at 11:05

## 2023-03-22 RX ADMIN — SODIUM CHLORIDE 25 ML/HR: 9 INJECTION, SOLUTION INTRAVENOUS at 10:54

## 2023-03-22 RX ADMIN — MIDAZOLAM 0.5 MG: 1 INJECTION INTRAMUSCULAR; INTRAVENOUS at 11:12

## 2023-03-22 RX ADMIN — LIDOCAINE HYDROCHLORIDE 20 ML: 10 INJECTION, SOLUTION INFILTRATION; PERINEURAL at 11:09

## 2023-03-22 NOTE — NURSING NOTE
"Dr. Penaloza called and stated, \"second CXR has been read and looks fine.\"  Pt verbalized understanding of discharge instructions.  IV removed.  NAD noted.   "

## 2023-03-22 NOTE — NURSING NOTE
Pt arrived to bay 2 for her lung biopsy on right.  Protective goggles and mask in place with all patient interactions today

## 2023-03-22 NOTE — DISCHARGE INSTRUCTIONS

## 2023-03-22 NOTE — POST-PROCEDURE NOTE
POST PROCEDURE NOTE    Procedure: CT GUIDED LUNG BIOPSY    Pre-Procedure Diagnosis: RLL nodule    Post-procedure Diagnosis: same    Findings: successful bx, bloodpatch    Complications: none    Blood loss: min    Specimen Removed: multiple cores    Disposition:   Discharge home

## 2023-03-22 NOTE — NURSING NOTE
"Dr. Penaloza called and stated \"first chest xray looks good and pt may eat.\"  He also asked for a status of patient. Pt is NAD at present time.   "

## 2023-03-23 ENCOUNTER — TELEPHONE (OUTPATIENT)
Dept: RADIOLOGY | Facility: HOSPITAL | Age: 73
End: 2023-03-23
Payer: MEDICARE

## 2023-03-24 ENCOUNTER — TELEPHONE (OUTPATIENT)
Dept: SURGERY | Facility: CLINIC | Age: 73
End: 2023-03-24
Payer: MEDICARE

## 2023-03-24 LAB
LAB AP CASE REPORT: NORMAL
LAB AP CLINICAL INFORMATION: NORMAL
LAB AP DIAGNOSIS COMMENT: NORMAL
LAB AP SPECIAL STAINS: NORMAL
PATH REPORT.ADDENDUM SPEC: NORMAL
PATH REPORT.FINAL DX SPEC: NORMAL
PATH REPORT.GROSS SPEC: NORMAL

## 2023-03-24 NOTE — TELEPHONE ENCOUNTER
Spoke with pt regarding upcoming telephone visit with Dr. Soriano on 3/29. Pt stated that she had been coughing up small traces of blood in her mucus two days post biopsy. Told Pt I would call back if there was concern.

## 2023-03-27 ENCOUNTER — PATIENT OUTREACH (OUTPATIENT)
Dept: OTHER | Facility: HOSPITAL | Age: 73
End: 2023-03-27
Payer: MEDICARE

## 2023-03-27 DIAGNOSIS — C34.31 MALIGNANT NEOPLASM OF LOWER LOBE OF RIGHT LUNG: Primary | ICD-10-CM

## 2023-03-29 ENCOUNTER — OFFICE VISIT (OUTPATIENT)
Dept: SURGERY | Facility: CLINIC | Age: 73
End: 2023-03-29
Payer: MEDICARE

## 2023-03-29 ENCOUNTER — PATIENT OUTREACH (OUTPATIENT)
Dept: OTHER | Facility: HOSPITAL | Age: 73
End: 2023-03-29
Payer: MEDICARE

## 2023-03-29 DIAGNOSIS — C34.91 NON-SMALL CELL CANCER OF RIGHT LUNG: Primary | ICD-10-CM

## 2023-03-29 DIAGNOSIS — C34.31 MALIGNANT NEOPLASM OF LOWER LOBE, RIGHT BRONCHUS OR LUNG: ICD-10-CM

## 2023-03-29 NOTE — PROGRESS NOTES
Called patient; introduced myself and explained navigational services.  The patient was receptive.  The patient just heard from Dr. 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  She previously had a right VATS robotic assisted wedge resection of the lower lobe on June 29, 2022 with Dr. Quinones, which revealed granulomatous inflammation.  Dr. Soriano ordered a PET scan and PFTs.  Once these two tests are completed, she will meet with Dr. Soriano to discuss if she is a surgery candidate versus definitive chemotherapy/radiation therapy. The patient has a good understanding of her pathology and treatment plan right now.  She was able to teach back her plan of care.  Mailed her PET brochure and explained pertinent information regarding preparation for and location of tests.  Scheduling will call her with date/time of tests. Patient verbalized understanding.    The patient is alert and oriented. She is independent with ADLs and does not use assistive devices. She lives in a single level home with her  and denies falls.  The patient is able to drive although has family who can assist with transportation as needed.     The patient has an ACP on file.    The patient is currently smoking 1/2 PPD. She reports being able to quit before and is working on smoking cessation. She declined meeting with Aarti for a smoking cessation appt.  She is confident that she can quit again and states her  is supportive of this.  We discussed The Quit line. I mailed her information on this.    The patient verbalized being anxious after hearing of her cancer diagnosis today.  She reports an adequate support system with her , daughter and friends.  The patient saw a behavioral specialist in the past, although states he is getting ready to retire.  She is not currently taking any  meds for depression or anxiety, although states she took Adderal in the past. She may discuss this with her PCP.  Discussed our OSW and Behavioral Health teams. Provided active listening and emotional support, validating and normalizing patient's feelings. Patient expressed gratitude for my support and denied any additional needs at this time.    The patient states she is familiar with cancer; her ex- had cancer as well as several other family members.  We discussed [employer] and [employer]; the patient declined a referral to either vendor at this time.    The patient reports a history of COPD and a fib.  She reports recently being ordered breathing treatments, although states she has not used it since last week. She is not using supplemental oxygen.    The patient stated her appetite has been good, although when she gets anxious, her appetite decreases. She reported her weight as 120 today, although stated it was as high as 122.  We discussed [facility] services.  We are holding off on submitting a referral at this time since her weight is stable.    The patient denies any transportation, financial or resource needs/issues at this time.     We discussed integrative therapies and other services at [facility].  Mailed her navigation folder with the following information: [facility], Cancer and Restorative Exercise - CARE, [facility] exercise [facility], Living with a Diagnosis of Lung Cancer, Lung Cancer [place] Support [facility], [facility], Message Therapy, Reiki Therapy, [facility], Cancer Nutrition, Smoking Cessation and Survivorship Clinic.      I will call in 1-2 weeks; she has my contact information and will call as needed.

## 2023-03-29 NOTE — PROGRESS NOTES
"You have chosen to receive care through a telephone visit. Do you consent to use a telephone visit for your medical care today? Yes    Chief Complaint  Right lower lobe biopsy-proven non-small cell lung cancer.  Subjective        Darling Morgan presents to [facility] THORACIC SURGERY  History of Present Illness  Ms. Morgan is a 72-year-old lady who is a follow-up telephone visit today to discuss her most recent CT-guided biopsy results.  To recap, she previously had a right VATS robotic assisted wedge resection of the lower lobe on June 29, 2022 with Dr. Quinones, this lesion resulted as granulomatous inflammation.  She had been followed for serial surveillance for a right lower lobe pulmonary nodule and when I saw her at the beginning of March this pulmonary nodule had enlarged over the last 3 months.  She underwent CT-guided biopsy of this lesion and the pathology came back as fragments of poorly differentiated non-small cell carcinoma, supporting poorly differentiated pulmonary adenocarcinoma.  Reviewing her images, she has not obtained a CT/PET scan and her most recent pulmonary function test were from 2020, almost 3 years ago.  She continues to smoke, and she smokes approximately half a pack per day.    Objective   Vital Signs:  There were no vitals taken for this visit.  Estimated body mass index is 18.79 kg/m² as calculated from the following:    Height as of 3/22/23: 170.2 cm (67\").    Weight as of 3/22/23: 54.4 kg (120 lb).       BMI is within normal parameters. No other follow-up for BMI required.      Physical Exam   No physical exam as this was a telephone visit  Result Review :  Pathology from her most recent CT-guided biopsy was reviewed and visualized by myself.  I reviewed her CT scan performed on March 2, 2023.             Assessment and Plan   Diagnoses and all orders for this visit:    1. Non-small cell cancer of right lung (HCC) (Primary)  -     NM PET/CT Skull Base to Mid Thigh; " Future  -     Full Pulmonary Function Test With Bronchodilator; Future    2. Malignant neoplasm of lower lobe, right bronchus or lung (HCC)  -     NM PET/CT Skull Base to Mid Thigh; Future    Ms. Morgan is a 72-year-old lady who has biopsy-proven right lower lobe non-small cell lung cancer.  She is a current smoker.  We need to obtain a full set of pulmonary function test with DLCO to evaluate to see if she is a candidate for pulmonary resection.  We also need to obtain a CT/PET scan to evaluate for lymph node status and distant disease.  Once these 2 test are obtained, we will meet in person to discuss if she is an operative candidate and to plan operative resection versus definitive chemotherapy/radiation therapy.       I spent 30 minutes caring for Darling on this date of service. This time includes time spent by me in the following activities:preparing for the visit, reviewing tests, obtaining and/or reviewing a separately obtained history, performing a medically appropriate examination and/or evaluation , counseling and educating the patient/family/caregiver, ordering medications, tests, or procedures, referring and communicating with other health care professionals , documenting information in the medical record, independently interpreting results and communicating that information with the patient/family/caregiver and care coordination  Follow Up   No follow-ups on file.  Patient was given instructions and counseling regarding her condition or for health maintenance advice. Please see specific information pulled into the AVS if appropriate.

## 2023-04-12 ENCOUNTER — PATIENT OUTREACH (OUTPATIENT)
Dept: OTHER | Facility: HOSPITAL | Age: 73
End: 2023-04-12
Payer: MEDICARE

## 2023-04-12 NOTE — PROGRESS NOTES
Called patient. Reintroduced myself. She said she had to go; her doctor just called; she will call me back    Call from patient. She is doing ok. Has PET tomorrow and meets with Dr. Soriano next Wednesday.  I will try to attend that appt.    She rec'd my navigation folder and has no questions/concerns.    She denies any resource needs at this time    I will meet her next week; she has my info and will call as needed

## 2023-04-13 ENCOUNTER — HOSPITAL ENCOUNTER (OUTPATIENT)
Dept: PET IMAGING | Facility: HOSPITAL | Age: 73
Discharge: HOME | End: 2023-04-13
Payer: MEDICARE

## 2023-04-13 DIAGNOSIS — C34.31 MALIGNANT NEOPLASM OF LOWER LOBE, RIGHT BRONCHUS OR LUNG: ICD-10-CM

## 2023-04-13 DIAGNOSIS — C34.91 NON-SMALL CELL CANCER OF RIGHT LUNG: ICD-10-CM

## 2023-04-13 LAB — GLUCOSE BLDC GLUCOMTR-MCNC: 98 MG/DL (ref 70–130)

## 2023-04-13 PROCEDURE — A9552 F18 FDG: HCPCS | Performed by: STUDENT IN AN ORGANIZED HEALTH CARE EDUCATION/TRAINING PROGRAM

## 2023-04-13 PROCEDURE — 78815 PET IMAGE W/CT SKULL-THIGH: CPT

## 2023-04-13 PROCEDURE — 82962 GLUCOSE BLOOD TEST: CPT

## 2023-04-13 PROCEDURE — 0 FLUDEOXYGLUCOSE F18 SOLUTION: Performed by: STUDENT IN AN ORGANIZED HEALTH CARE EDUCATION/TRAINING PROGRAM

## 2023-04-13 RX ADMIN — FLUDEOXYGLUCOSE F18 1 DOSE: 300 INJECTION INTRAVENOUS at 07:35

## 2023-04-19 ENCOUNTER — TELEPHONE (OUTPATIENT)
Dept: SURGERY | Facility: CLINIC | Age: 73
End: 2023-04-19
Payer: MEDICARE

## 2023-04-19 ENCOUNTER — OFFICE VISIT (OUTPATIENT)
Dept: SURGERY | Facility: CLINIC | Age: 73
End: 2023-04-19
Payer: MEDICARE

## 2023-04-19 ENCOUNTER — PATIENT OUTREACH (OUTPATIENT)
Dept: OTHER | Facility: HOSPITAL | Age: 73
End: 2023-04-19
Payer: MEDICARE

## 2023-04-19 VITALS
HEIGHT: 67 IN | WEIGHT: 122.5 LBS | BODY MASS INDEX: 19.23 KG/M2 | HEART RATE: 62 BPM | OXYGEN SATURATION: 99 % | DIASTOLIC BLOOD PRESSURE: 90 MMHG | SYSTOLIC BLOOD PRESSURE: 128 MMHG

## 2023-04-19 DIAGNOSIS — C34.91 NON-SMALL CELL CANCER OF RIGHT LUNG: Primary | ICD-10-CM

## 2023-04-19 NOTE — PROGRESS NOTES
"Chief Complaint  Biopsy-proven non-small cell lung cancer of her right lower lobe PD-L1 expression is 80%  Subjective        Darling Morgan presents to [facility] THORACIC SURGERY  History of Present Illness   Ms. Morgan is a pleasant 72-year-old lady who presents today to discuss her pathology results from her CT-guided biopsy and her CT/PET scan.  She is still a current smoker.  She did undergo a right lower lobe wedge resection with Dr. Quinones on June 29, 2022.  The lesion he resected at the time was granulomatous inflammation.  She underwent CT-guided biopsy which was now consistent with non-small cell lung cancer.  Her pulmonary function test her FEV1 was 68% predicted and her DLCO was 47% predicted.  In addition her CT/PET scan showed some hypermetabolic area in her rectum, she denies any symptoms.    Objective   Vital Signs:  /90 (BP Location: Left arm, Patient Position: Sitting, Cuff Size: Adult)   Pulse 62   Ht 170.2 cm (67.01\")   Wt 55.6 kg (122 lb 8 oz)   SpO2 99%   BMI 19.18 kg/m²   Estimated body mass index is 19.18 kg/m² as calculated from the following:    Height as of this encounter: 170.2 cm (67.01\").    Weight as of this encounter: 55.6 kg (122 lb 8 oz).       BMI is within normal parameters. No other follow-up for BMI required.      Physical Exam   Result Review :  CT/PET scan from 4/13/2023 was visualized by myself she has metabolic activity in the known right lower lobe pulmonary nodule and she has a short segment of nodular thickening within the rectum.             Assessment and Plan   Diagnoses and all orders for this visit:    1. Non-small cell cancer of right lung (Primary)  -     Ambulatory Referral to Gastroenterology    Ms. Morgan is a pleasant 71-year-old lady who is a active smoker.  She has biopsy-proven non-small cell lung cancer of her right lower lobe.  Her pulmonary function test are right on the border of surgical resectable candidate.  Due to this, I am " going to order a cardiopulmonary exercise test.  If she is borderline on her cardiopulmonary exercise test I would refer her to oncology to get definitive chemotherapy/immunotherapy and radiation treatment of this lesion.  If she is appropriate for surgical resection she would undergo a robotic assisted right lower lobectomy.   In addition, due to the fact that she had some thickening in her rectum on CT/PET scan we will send her for urgent consultation to gastroenterology to see if she can undergo a colonoscopy to evaluate for this area.  Once her cardiopulmonary exercise test has resulted we will either schedule her for surgical resection versus evaluation by medical oncology and radiation oncology.       I spent 30 minutes caring for Darling on this date of service. This time includes time spent by me in the following activities:preparing for the visit, reviewing tests, obtaining and/or reviewing a separately obtained history, performing a medically appropriate examination and/or evaluation , counseling and educating the patient/family/caregiver, ordering medications, tests, or procedures, referring and communicating with other health care professionals , documenting information in the medical record, independently interpreting results and communicating that information with the patient/family/caregiver and care coordination  Follow Up   No follow-ups on file.  Patient was given instructions and counseling regarding her condition or for health maintenance advice. Please see specific information pulled into the AVS if appropriate.

## 2023-04-19 NOTE — PROGRESS NOTES
I accompanied patient and family to Dr. Soriano's office visit.  Dr. Soriano informed the patient that her pulmonary function test are right on the border of surgical resectable candidate; she needs a CPET, cardiopulmonary exercise test to determine of she would be appropriate for surgery.  Dr. Soriano's office has contacted [facility] to schedule the procedure. Dr. Soriano also referred the patient to gastroenterology to see if she can undergo a colonoscopy to evaluate rectal thickening that was noted on her PET scan.  Once the tests are complete, Dr. Soriano will either schedule her for surgical resection or refer her for evaluation by medical oncology and radiation oncology.    The patient discussed concerns with waiting for these tests to be completed. Provided active listening and emotional support, validating and normalizing patient's feelings. Patient expressed gratitude for my support and denied any additional needs at this time.    The patient rec'd my navigation folder. She has no questions regarding the information provided. I will follow up in 1-2 weeks; the patient will call as needed

## 2023-04-20 ENCOUNTER — PRE-PROCEDURE SCREENING (OUTPATIENT)
Dept: GASTROENTEROLOGY | Facility: CLINIC | Age: 73
End: 2023-04-20
Payer: MEDICARE

## 2023-04-20 NOTE — TELEPHONE ENCOUNTER
LAST SCOPE 6YRS  ( NO RECORDS NOT SURE WHERE) --No personal history of polyps--No family history of polyps--Family history of colon cancer  ([place]  AUNT)-- XARELTO--Medications:          Acetaminophen-Caffeine (EXCEDRIN ASPIRIN FREE PO)  amLODIPine (NORVASC) 2.5 MG tablet  atorvastatin (LIPITOR) 40 MG tablet    metoprolol tartrate (LOPRESSOR) 50 MG tablet  omeprazole (priLOSEC) 20 MG capsule  potassium chloride (K-DUR,KLOR-CON) 20 MEQ CR tablet    rivaroxaban (Xarelto) 20 MG tablet  zolpidem (AMBIEN) 10 MG tablet      Pt not sure if she had  colonoscopy            QUESTIONNAIRE SCREENING  SCAN HAS BEEN SENT TO DOCTOR FOR REVIEW               QUESTIONNAIRE SCREENING  SCAN IN  MEDIA & HAS BEEN SENT TO DOCTOR FOR REVIEW

## 2023-04-24 ENCOUNTER — PREP FOR SURGERY (OUTPATIENT)
Dept: OTHER | Facility: HOSPITAL | Age: 73
End: 2023-04-24
Payer: MEDICARE

## 2023-04-24 ENCOUNTER — TELEPHONE (OUTPATIENT)
Dept: GASTROENTEROLOGY | Facility: CLINIC | Age: 73
End: 2023-04-24
Payer: MEDICARE

## 2023-04-24 DIAGNOSIS — R94.8 ABNORMAL PET SCAN OF COLON: Primary | ICD-10-CM

## 2023-04-24 NOTE — TELEPHONE ENCOUNTER
Message sent to Geena MERDIA regarding approval for pt to hold xarelto for 2 days prior to colonoscopy with Dr Miller.

## 2023-04-24 NOTE — TELEPHONE ENCOUNTER
Called pt and pt reports she was diagnosed with lung cancer.  She reports that a spot was also found on her rectum on a pet scan.   Lung surgeon Dr Soriano  is wanting her to have the c/s first to see if this is cancer.   Pt reports that this is very important and she needs to get the c/s soon since this is holding up her lung surgery.  Advised will send message to Dr Miller.

## 2023-04-24 NOTE — TELEPHONE ENCOUNTER
Ok to hold Xarelto 48 hours prior to scope.        SUKHJINDER Harkins  [facility]   [address]  [place] [address]  Ph: [phone]  Fax: [phone]

## 2023-04-24 NOTE — TELEPHONE ENCOUNTER
Caller: Darling Morgan    Relationship: Self    Best call back number: [phone]    What is the best time to reach you: ANY    Who are you requesting to speak with (clinical staff, provider,  specific staff member): DR. HUNT &     What was the call regarding: PT CALLED IN REGARDS TO SCHEDULING HER COLONOSCOPY ASAP THIS IS VERY URGENT PT STATES AND SHE WOULD ALSO LIKE FOR AYLIN HUNT TO CALL HER BACK ASAP AS SHE HAS OTHER QUESTIONS     Do you require a callback:YES

## 2023-04-24 NOTE — TELEPHONE ENCOUNTER
HERNANDO ESCALONA  for colonoscopy on  05/01/2023 arrive at 9AM    . Mailed Prep instructions to Mailing address on-file. ----[employer]

## 2023-04-27 NOTE — TELEPHONE ENCOUNTER
Caller: Darling Morgan    Relationship to patient: Self    Best call back number: [phone]    Patient is needing: PT CALLING TO REQUEST PREP INSTRUCTIONS FOR PROCEDURE ON 5/1/23. VERB WENT OVER WITH PT BUT PT ALSO REQUESTS THEY BE UPLOADED INTO C2FO. IF NEED TO CALL BACK CALL BACK ANYTIME OK TO LVM.

## 2023-05-01 ENCOUNTER — ANESTHESIA EVENT (OUTPATIENT)
Dept: GASTROENTEROLOGY | Facility: HOSPITAL | Age: 73
End: 2023-05-01
Payer: MEDICARE

## 2023-05-01 ENCOUNTER — ANESTHESIA (OUTPATIENT)
Dept: GASTROENTEROLOGY | Facility: HOSPITAL | Age: 73
End: 2023-05-01
Payer: MEDICARE

## 2023-05-01 ENCOUNTER — HOSPITAL ENCOUNTER (OUTPATIENT)
Facility: HOSPITAL | Age: 73
Setting detail: HOSPITAL OUTPATIENT SURGERY
Discharge: HOME | End: 2023-05-01
Attending: INTERNAL MEDICINE | Admitting: INTERNAL MEDICINE
Payer: MEDICARE

## 2023-05-01 VITALS
BODY MASS INDEX: 19.3 KG/M2 | DIASTOLIC BLOOD PRESSURE: 62 MMHG | RESPIRATION RATE: 16 BRPM | OXYGEN SATURATION: 97 % | WEIGHT: 123 LBS | HEIGHT: 67 IN | TEMPERATURE: 98 F | HEART RATE: 53 BPM | SYSTOLIC BLOOD PRESSURE: 119 MMHG

## 2023-05-01 DIAGNOSIS — R94.8 ABNORMAL PET SCAN OF COLON: ICD-10-CM

## 2023-05-01 PROCEDURE — 45385 COLONOSCOPY W/LESION REMOVAL: CPT | Performed by: INTERNAL MEDICINE

## 2023-05-01 PROCEDURE — 88305 TISSUE EXAM BY PATHOLOGIST: CPT | Performed by: INTERNAL MEDICINE

## 2023-05-01 PROCEDURE — 25010000002 PROPOFOL 10 MG/ML EMULSION: Performed by: REGISTERED NURSE

## 2023-05-01 PROCEDURE — 0 LIDOCAINE 1 % SOLUTION: Performed by: REGISTERED NURSE

## 2023-05-01 PROCEDURE — S0260 H&P FOR SURGERY: HCPCS | Performed by: INTERNAL MEDICINE

## 2023-05-01 RX ORDER — PROPOFOL 10 MG/ML
VIAL (ML) INTRAVENOUS AS NEEDED
Status: DISCONTINUED | OUTPATIENT
Start: 2023-05-01 | End: 2023-05-01 | Stop reason: SURG

## 2023-05-01 RX ORDER — LIDOCAINE HYDROCHLORIDE 10 MG/ML
INJECTION, SOLUTION INFILTRATION; PERINEURAL AS NEEDED
Status: DISCONTINUED | OUTPATIENT
Start: 2023-05-01 | End: 2023-05-01 | Stop reason: SURG

## 2023-05-01 RX ORDER — ONDANSETRON 2 MG/ML
4 INJECTION INTRAMUSCULAR; INTRAVENOUS ONCE AS NEEDED
Status: DISCONTINUED | OUTPATIENT
Start: 2023-05-01 | End: 2023-05-01 | Stop reason: HOSPADM

## 2023-05-01 RX ORDER — SODIUM CHLORIDE, SODIUM LACTATE, POTASSIUM CHLORIDE, CALCIUM CHLORIDE 600; 310; 30; 20 MG/100ML; MG/100ML; MG/100ML; MG/100ML
30 INJECTION, SOLUTION INTRAVENOUS CONTINUOUS PRN
Status: DISCONTINUED | OUTPATIENT
Start: 2023-05-01 | End: 2023-05-01 | Stop reason: HOSPADM

## 2023-05-01 RX ADMIN — PROPOFOL 100 MG: 10 INJECTION, EMULSION INTRAVENOUS at 10:00

## 2023-05-01 RX ADMIN — LIDOCAINE HYDROCHLORIDE 30 MG: 10 INJECTION, SOLUTION INFILTRATION; PERINEURAL at 10:00

## 2023-05-01 RX ADMIN — SODIUM CHLORIDE, POTASSIUM CHLORIDE, SODIUM LACTATE AND CALCIUM CHLORIDE 30 ML/HR: 600; 310; 30; 20 INJECTION, SOLUTION INTRAVENOUS at 09:22

## 2023-05-01 RX ADMIN — PROPOFOL 140 MCG/KG/MIN: 10 INJECTION, EMULSION INTRAVENOUS at 10:00

## 2023-05-01 RX ADMIN — PROPOFOL 50 MG: 10 INJECTION, EMULSION INTRAVENOUS at 10:27

## 2023-05-01 NOTE — H&P
[facility]  Pre Procedure History & Physical    Chief Complaint:   Abnormal PET scan of the rectum    Subjective     H PI: 72-year-old female here today for colonoscopy.  She had a recent PET scan which showed short segment nodular thickening within the rectum.  She reports her last colonoscopy was about 4 years ago with Dr. Tipton.  She has never had a polyp.  No family history of colon cancer or polyps with the exception of the niece.  She reports issues with constipation and fecal urgency.  Patient has a pulmonary malignancy and is preparing to undergo treatment.    Past Medical History:   Past Medical History:   Diagnosis Date   • AC joint arthropathy    • ADD (attention deficit disorder)    • Anxiety    • Arthritis    • Atrial fibrillation     DR RENDON   • Fuentes esophagus    • Bell's palsy    • Bigeminy    • Cancer of right lung     DR. LAZARO KIDD, NEW DIAGNOSIS   • Chronic pain     BILATERAL LEGS   • Depression    • Dysphagia     FEELS LIKE SOMETHING STUCK IN HER THROAT AT TIMES   • Dysrhythmia 06/2022    Afib   • Emphysema lung    • GERD (gastroesophageal reflux disease)    • Hemorrhoid    • History of COVID-19     6/8/2022 IN EPIC   • History of fusion of cervical spine    • History of stomach ulcers    • Hyperlipidemia    • Hypertension    • Insomnia    • Lung nodule     RIGHT LOBE   • Meningioma    • On anticoagulant therapy    • Postmenopausal status 08/29/2013   • Skin abrasion     ON LEFT FOREARM FROM NAIL 1 WEEK AGO SLIGHTLY RED AROUND IT   • Staphylococcal infection     AFTER NECK SURGERY IN 1980'S       Past Surgical History:  Past Surgical History:   Procedure Laterality Date   • ANTERIOR CERVICAL DISCECTOMY W/ FUSION     • BACK SURGERY      LUMBAR LAMINECTOMY   • CARDIAC CATHETERIZATION N/A 05/26/2017    Procedure: Coronary angiography;  Surgeon: William Raphael MD;  Location: [facility] INVASIVE LOCATION;  Service:    • CARDIAC CATHETERIZATION N/A 05/26/2017     Procedure: Left heart cath;  Surgeon: William Raphael MD;  Location[facility] CATH [facility] LOC[facility];  Service:    • CARDIAC CATHETERIZATION N/A 2017    Procedure: Left ventriculography;  Surgeon: William Raphael MD;  [facility];  Service:    • COLONOSCOPY     • D & C AND LAPAROSCOPY      WITH BLOOD TRANSFUSION.   • HYSTERECTOMY     • INCISION AND DRAINAGE ABSCESS POSTERIOR CERVICALSPINE      FROM INFECTION AFTER CERVICAL SPINE SURGERY IN    • NECK SURGERY      for herniated disk and got staph infection.   • THORACOSCOPY Right 2022    Procedure: BRONCHOSCOPY, RIGHT VIDEO ASSISTED THORACOSCOPY WITH DAVINCI ROBOT WITH RIGHT LOWER LOBE WEDGE RESECTION x2 WITH INTERCOSTAL NERVE BLOCKS AND MEDIASTINAL LYMPHADENECTOMY;  Surgeon: Bharathi Quinones III, MD;  Location: [facility] OR;  Service: Robotics - DaVinci;  Laterality: Right;   • VEIN LIGATION AND STRIPPING BILATERAL Right        Family History:  Family History   Problem Relation Age of Onset   • Ovarian cancer Mother         with mets   • Brain cancer Father    • Thyroid disease Daughter         hashimototos   • Coronary artery disease Maternal Grandfather          MI   • Other Other         Brain tumor   • Cancer Other    • Thyroid disease Other    • Malig Hyperthermia Neg Hx        Social History:   reports that she has been smoking cigarettes. She has been smoking an average of .5 packs per day. She has never used smokeless tobacco. She reports current alcohol use. She reports that she does not use drugs.    Medications:   Medications Prior to Admission   Medication Sig Dispense Refill Last Dose   • Acetaminophen-Caffeine (EXCEDRIN ASPIRIN FREE PO) Take 1 tablet by mouth As Needed.   2023   • amLODIPine (NORVASC) 2.5 MG tablet TAKE 1 TABLET BY MOUTH DAILY 90 tablet 3 2023   • atorvastatin (LIPITOR) 40 MG tablet TAKE 1 TABLET BY MOUTH EVERY NIGHT 90 tablet 3 2023   • metoprolol tartrate (LOPRESSOR) 50 MG  "tablet Take 1 tablet by mouth Every 12 (Twelve) Hours. (Patient taking differently: Take 1.5 tablets by mouth Every 12 (Twelve) Hours.) 180 tablet 3 5/1/2023   • omeprazole (priLOSEC) 20 MG capsule Take 2 capsules by mouth Daily. TAKES TWO CAPSULE BY MOUTH DAILY   4/30/2023   • potassium chloride (K-DUR,KLOR-CON) 20 MEQ CR tablet Take 1 tablet by mouth Daily. 90 tablet 3 4/30/2023   • rivaroxaban (Xarelto) 20 MG tablet Take 1 tablet by mouth Daily With Dinner. Indications: Atrial Fibrillation (Patient taking differently: Take 1 tablet by mouth Daily. PT TO HOLD 48 HOURS PRIOR TO SURGERY  Indications: Atrial Fibrillation) 90 tablet 3 4/29/2023   • zolpidem (AMBIEN) 10 MG tablet Take 1 tablet by mouth Every Night.   4/30/2023       Allergies:  Patient has no known allergies.    ROS:    Pertinent items are noted in HPI, all other systems reviewed and negative     Objective     Blood pressure 94/62, pulse 57, temperature 98 °F (36.7 °C), temperature source Oral, resp. rate 16, height 170.2 cm (67\"), weight 55.8 kg (123 lb), SpO2 97 %.    Physical Exam   Constitutional: Pt is oriented to person, place, and time and well-developed, well-nourished, and in no distress.   Mouth/Throat: Oropharynx is clear and moist.   Neck: Normal range of motion.   Cardiovascular: Normal rate, regular rhythm    Pulmonary/Chest: Effort normal    Abdominal: Soft. Nontender  Skin: Skin is warm and dry.   Psychiatric: Mood, memory, affect and judgment normal.     Assessment & Plan     Diagnosis:  Abnormal PET scan of the rectum    Anticipated Surgical Procedure:  colonoscopy    The risks, benefits, and alternatives of this procedure have been discussed with the patient or the responsible party- the patient understands and agrees to proceed.                                                              "

## 2023-05-01 NOTE — ANESTHESIA PREPROCEDURE EVALUATION
Anesthesia Evaluation     NPO Solid Status: > 8 hours             Airway   Mallampati: II  TM distance: >3 FB  Neck ROM: full  Dental    (+) upper dentures and poor dentition    Pulmonary - normal exam   (+) a smoker Current, lung cancer, COPD, shortness of breath,   Cardiovascular - normal exam  Exercise tolerance: poor (<4 METS)    (+) hypertension, dysrhythmias (converted to sinus bradycardia, heparin drip stopped at 0600) Paroxysmal Atrial Fib,       Neuro/Psych  (+) psychiatric history Depression,    GI/Hepatic/Renal/Endo    (+)  GERD,      Musculoskeletal     Abdominal    Substance History      OB/GYN          Other   arthritis,                      Anesthesia Plan    ASA 4     MAC     (Recently dx with lung cancer  )    Anesthetic plan, risks, benefits, and alternatives have been provided, discussed and informed consent has been obtained with: patient.        CODE STATUS:

## 2023-05-01 NOTE — ANESTHESIA POSTPROCEDURE EVALUATION
Patient: Darling Morgan    Procedure Summary     Date: 05/01/23 Room / Location: [facility] ENDOSCOPY 8 / [facility] ENDOSCOPY    Anesthesia Start: 0956 Anesthesia Stop: 1048    Procedure: COLONOSCOPY into cecum with hot and cold snare polypectomies Diagnosis:       Abnormal PET scan of colon      (Abnormal PET scan of colon [R94.8])    Surgeons: Geena Miller MD Provider: Walter Hernandez MD    Anesthesia Type: MAC ASA Status: 4          Anesthesia Type: MAC    Vitals  Vitals Value Taken Time   /71 05/01/23 1047   Temp     Pulse 59 05/01/23 1047   Resp 20 05/01/23 1047   SpO2 94 % 05/01/23 1047           Post Anesthesia Care and Evaluation    Patient location during evaluation: bedside  Patient participation: complete - patient participated  Level of consciousness: awake  Pain management: adequate    Airway patency: patent  Anesthetic complications: No anesthetic complications  PONV Status: controlled  Cardiovascular status: acceptable  Respiratory status: acceptable  Hydration status: acceptable    Comments: --------------------            05/01/23               1047     --------------------   BP:       101/71     Pulse:      59       Resp:       20       Temp:                SpO2:      94%      --------------------

## 2023-05-02 LAB
LAB AP CASE REPORT: NORMAL
PATH REPORT.FINAL DX SPEC: NORMAL
PATH REPORT.GROSS SPEC: NORMAL

## 2023-05-03 ENCOUNTER — PATIENT OUTREACH (OUTPATIENT)
Dept: OTHER | Facility: HOSPITAL | Age: 73
End: 2023-05-03
Payer: MEDICARE

## 2023-05-03 NOTE — PROGRESS NOTES
Called patient. She had the CPET @ 4/24 @ [facility] and colonoscopy 5/1.  She had tried to call [facility] and get her results. She said it would be 1 week.  I will try to see if I can call [facility]. The patient is just waiting to see if she is a surgical candidate. She appreciated the call.    Called [facility], sent to several depts, transferred to pul lab [phone], left ms requesting cb about pt CPET results.

## 2023-05-04 ENCOUNTER — PATIENT OUTREACH (OUTPATIENT)
Dept: OTHER | Facility: HOSPITAL | Age: 73
End: 2023-05-04
Payer: MEDICARE

## 2023-05-04 ENCOUNTER — TELEPHONE (OUTPATIENT)
Dept: GASTROENTEROLOGY | Facility: CLINIC | Age: 73
End: 2023-05-04
Payer: MEDICARE

## 2023-05-04 DIAGNOSIS — C34.91 NON-SMALL CELL CANCER OF RIGHT LUNG: Primary | ICD-10-CM

## 2023-05-04 NOTE — TELEPHONE ENCOUNTER
----- Message from Geena Miller MD sent at 5/2/2023  9:48 AM EDT -----  The polyp(s) biopsies showed adenomatous change. This is not cancerous but is considered potentially precancerous. Follow-up colonoscopy in 1 years is advised.

## 2023-05-04 NOTE — PROGRESS NOTES
Call from patient. She said she talked to [facility] today, [phone]. They stated they didn't have Dr. Soriano's fax number. She gave them Dr. Soriano's office number.  I will call [facility] as well.  Called [facility], they already faxed to Dr. Soriano's office.   Sent Message to Dr. Soriano about CPET results.   Called patient back, explained I talked to [facility] who faxed results Dr. Soriano. She appreciated the return call

## 2023-05-04 NOTE — TELEPHONE ENCOUNTER
HM and CS recall have been completed for 5/1/2024.  It is noted, patient has seen Dr. Miller's message.   "I couldn't get up with my right side."  pt c/o weakness to right side, thinks she fell; per daughter, pt was home with aide, went into bedroom and shut door behind her; aide heard a thump and pt was on right side, could not get up on own; pt c/o pain and weakness to right upper arm and side

## 2023-05-05 ENCOUNTER — TRANSCRIBE ORDERS (OUTPATIENT)
Dept: ADMINISTRATIVE | Facility: HOSPITAL | Age: 73
End: 2023-05-05
Payer: MEDICARE

## 2023-05-05 ENCOUNTER — OFFICE VISIT (OUTPATIENT)
Dept: SURGERY | Facility: CLINIC | Age: 73
End: 2023-05-05
Payer: MEDICARE

## 2023-05-05 DIAGNOSIS — I65.23 CAROTID STENOSIS, BILATERAL: Primary | ICD-10-CM

## 2023-05-05 DIAGNOSIS — C34.91 NON-SMALL CELL CANCER OF RIGHT LUNG: Primary | ICD-10-CM

## 2023-05-05 NOTE — PROGRESS NOTES
"You have chosen to receive care through a telephone visit. Do you consent to use a telephone visit for your medical care today? Yes    Chief Complaint  To discuss cardiopulmonary exercise test results.  Subjective        Darling Morgan presents to [facility] THORACIC SURGERY  History of Present Illness  Ms. Morgan is a 72-year-old lady who has biopsy-proven non-small cell lung cancer.  She had previously undergone a right lower lobe wedge resection with Dr. Quinones in June 2022 for granulomatous disease.  She had had a new right lower lobe pulmonary nodule that grew in size, this ultimately resulted in a PET scan which showed metabolic activity and a biopsy which was consistent with non-small cell lung cancer.  Her pulmonary function test were borderline and she did complain of mild shortness of breath with normal physical activity.  This resulted in undergoing a CPET and I am calling her today to discuss those results.  Objective   Vital Signs:  There were no vitals taken for this visit.  Estimated body mass index is 19.26 kg/m² as calculated from the following:    Height as of 5/1/23: 170.2 cm (67\").    Weight as of 5/1/23: 55.8 kg (123 lb).       BMI is within normal parameters. No other follow-up for BMI required.      Physical Exam   Result Review :    CPET showed a VO2 max of 9.             Assessment and Plan   Diagnoses and all orders for this visit:    1. Non-small cell cancer of right lung (Primary)    Ms. Morgan is a pleasant 72-year-old lady who has biopsy-proven non-small cell lung cancer of her right lower lobe.  Her pulmonary function test were borderline so she underwent a CPET.  During this test her VO2 max resulted as 9.  Ideally you would like a post surgical resection of VO2 max at least 15.  Due to this, I would recommend she undergo chemotherapy and radiation therapy for treatment of this non-small cell lung cancer.  We discussed this over the phone today and we will have her come " to multidisciplinary clinic on May 16, 2023 to set up appointments with radiation oncology and medical oncology.  At that appointment we can book her for possible port placement.       I spent 20 minutes caring for Darling on this date of service. This time includes time spent by me in the following activities:preparing for the visit, reviewing tests, obtaining and/or reviewing a separately obtained history, performing a medically appropriate examination and/or evaluation , counseling and educating the patient/family/caregiver, ordering medications, tests, or procedures, referring and communicating with other health care professionals , documenting information in the medical record, independently interpreting results and communicating that information with the patient/family/caregiver and care coordination  Follow Up   No follow-ups on file.  Patient was given instructions and counseling regarding her condition or for health maintenance advice. Please see specific information pulled into the AVS if appropriate.

## 2023-05-08 ENCOUNTER — PATIENT OUTREACH (OUTPATIENT)
Dept: OTHER | Facility: HOSPITAL | Age: 73
End: 2023-05-08
Payer: MEDICARE

## 2023-05-08 NOTE — PROGRESS NOTES
Called patient. She is aware of her [facility] appt 5/16 @ 8AM.  We discussed what to expect at this appt. All questions answered. I will meet her again at the 5/16 appt.

## 2023-05-15 ENCOUNTER — TELEPHONE (OUTPATIENT)
Dept: OTHER | Facility: HOSPITAL | Age: 73
End: 2023-05-15
Payer: MEDICARE

## 2023-05-16 ENCOUNTER — APPOINTMENT (OUTPATIENT)
Dept: OTHER | Facility: HOSPITAL | Age: 73
End: 2023-05-16
Payer: MEDICARE

## 2023-05-16 ENCOUNTER — PATIENT OUTREACH (OUTPATIENT)
Dept: OTHER | Facility: HOSPITAL | Age: 73
End: 2023-05-16
Payer: MEDICARE

## 2023-05-16 ENCOUNTER — OFFICE VISIT (OUTPATIENT)
Dept: OTHER | Facility: HOSPITAL | Age: 73
End: 2023-05-16
Payer: MEDICARE

## 2023-05-16 VITALS
OXYGEN SATURATION: 98 % | HEIGHT: 67 IN | HEART RATE: 66 BPM | BODY MASS INDEX: 18.99 KG/M2 | SYSTOLIC BLOOD PRESSURE: 99 MMHG | TEMPERATURE: 97.4 F | DIASTOLIC BLOOD PRESSURE: 68 MMHG | WEIGHT: 121 LBS

## 2023-05-16 DIAGNOSIS — C34.31 MALIGNANT NEOPLASM OF LOWER LOBE OF RIGHT LUNG: Primary | ICD-10-CM

## 2023-05-16 PROCEDURE — G0463 HOSPITAL OUTPT CLINIC VISIT: HCPCS

## 2023-05-16 PROCEDURE — 3074F SYST BP LT 130 MM HG: CPT | Performed by: STUDENT IN AN ORGANIZED HEALTH CARE EDUCATION/TRAINING PROGRAM

## 2023-05-16 PROCEDURE — 1126F AMNT PAIN NOTED NONE PRSNT: CPT | Performed by: STUDENT IN AN ORGANIZED HEALTH CARE EDUCATION/TRAINING PROGRAM

## 2023-05-16 PROCEDURE — 3078F DIAST BP <80 MM HG: CPT | Performed by: STUDENT IN AN ORGANIZED HEALTH CARE EDUCATION/TRAINING PROGRAM

## 2023-05-16 NOTE — PROGRESS NOTES
[facility] Radiation Oncology   Consult    Chief Complaint  RLL Adenocarcinoma      Diagnosis: Adenocarcinoma of the Right Lower Lobe    Overall Stage IA3    cT1c: 2.5cm on imaging  cN0: per PET CT, will need to monitor right hilar lymph node  cM0: per PET CT      Pacemaker: no  Prior History of Radiation: no  Contraindications to Radiation: no  Patient Requires Pregnancy Test: No, patient is female and >55 years and/or has undergone hysterectomy      HPI:    Darling Morgan is a 72 y.o. female with a history of paroxysmal atrial fibrillation, emphysema, and a right lower lobe lung nodule which has been followed with CT imaging.  This lesion grew to 2.5 cm on her most recent CT chest in March 2023.  PET/CT was ordered which demonstrated moderate avidity.  A biopsy was performed which showed poorly differentiated non-small cell carcinoma.  The patient's PFTs are marginal and she is not a candidate for surgical resection per Dr. Soriano.  The patient has been brought to multidisciplinary thoracic clinic to discuss definitive SBRT.    Of note the patient has a mildly hypermetabolic right hilar lymph node with borderline enlargement.  This lymph node is stable in size compared to his CT chest from June 2022.  We will watch this lymph node closely on surveillance CT chest imaging.        Imaging:      CT Chest 3/2/2023    IMPRESSION:   1. An irregular nodule within the right lower lobe has slightly increased in size compared to the previous exam. There has been significant increase since April 2022. A developing malignancy could have this appearance. CT-guided biopsy is recommended.       PET CT 4/13/2023    IMPRESSION:  1.  Moderate to intensely FDG avid thick-walled irregular cavitary  lesion within the right lower lobe which is increased in size since  06/10/2022 represent patient's known malignancy.  2.  Mild focal hypermetabolism over the right hilum which appears to  correspond with a borderline enlarged right hilar node is  grossly  unchanged in size and degree of FDG uptake since 06/10/2022, favored to  be reactive. However, continued close attention on follow-up is  recommended to exclude metastatic disease.  3.  Short segment of nodular thickening within the rectum raising  concern for neoplasm/malignancy. Correlation with colonoscopy is  recommended.  4.  Other findings as above      Pathology:      RLL Biopsy Pathology 5/1/2023    Final Diagnosis   1. Lung, Right Lower Lobe, Biopsy:                A. Fragments of poorly differentiated non small cell carcinoma with immunostaining supporting poorly                    differentiated pulmonary adenocarcinoma.         Labs:    Lab Results   Component Value Date    CREATININE 0.67 07/04/2022             Problem List:  Patient Active Problem List   Diagnosis   • Benign neoplasm of cerebral meninges   • Cerebral meningioma (HCC)   • Postmenopausal status   • Atrophic vaginitis   • Lung nodule   • Paroxysmal atrial fibrillation with rapid ventricular response   • Emphysema lung   • GERD (gastroesophageal reflux disease)   • History of COVID-19   • Hypertension   • Stroke-like symptoms   • Follow-up exam   • Acute posthemorrhagic anemia   • Hypopotassemia   • Abnormal PET scan of colon          Medications:  Current Outpatient Medications on File Prior to Visit   Medication Sig Dispense Refill   • Acetaminophen-Caffeine (EXCEDRIN ASPIRIN FREE PO) Take 1 tablet by mouth As Needed.     • amLODIPine (NORVASC) 2.5 MG tablet TAKE 1 TABLET BY MOUTH DAILY 90 tablet 3   • atorvastatin (LIPITOR) 40 MG tablet TAKE 1 TABLET BY MOUTH EVERY NIGHT 90 tablet 3   • metoprolol tartrate (LOPRESSOR) 50 MG tablet Take 1 tablet by mouth Every 12 (Twelve) Hours. (Patient taking differently: Take 1.5 tablets by mouth Every 12 (Twelve) Hours.) 180 tablet 3   • omeprazole (priLOSEC) 20 MG capsule Take 2 capsules by mouth Daily. TAKES TWO CAPSULE BY MOUTH DAILY     • potassium chloride (K-DUR,KLOR-CON) 20 MEQ CR  "tablet Take 1 tablet by mouth Daily. 90 tablet 3   • rivaroxaban (Xarelto) 20 MG tablet Take 1 tablet by mouth Daily With Dinner. Indications: Atrial Fibrillation (Patient taking differently: Take 1 tablet by mouth Daily. PT TO HOLD 48 HOURS PRIOR TO SURGERY  Indications: Atrial Fibrillation) 90 tablet 3   • zolpidem (AMBIEN) 10 MG tablet Take 1 tablet by mouth Every Night.       No current facility-administered medications on file prior to visit.          Allergies:  No Known Allergies      Family History:  The patient has no family history of conditions which would be contraindications to radiation therapy      Social History:  Current Smoker    Distance From Clinic: 30 minutes - 1 hour    Patient has someone who can assist with transportation: yes      Review of Systems:    Review of Systems   Constitutional: Positive for fatigue.   Respiratory: Positive for shortness of breath.    Neurological: Positive for dizziness and light-headedness.   All other systems reviewed and are negative.      Vital Signs:  BP 99/68   Pulse 66   Temp 97.4 °F (36.3 °C)   Ht 170.2 cm (67\")   Wt 54.9 kg (121 lb)   SpO2 98%   BMI 18.95 kg/m²   Estimated body mass index is 18.95 kg/m² as calculated from the following:    Height as of this encounter: 170.2 cm (67\").    Weight as of this encounter: 54.9 kg (121 lb).  Pain Score    05/16/23 0751   PainSc: 0-No pain         ECOG: Restricted in physically strenuous activity but ambulatory and able to carry out work of a light or sedentary nature, e.g., light house work, office work = 1    Physical Exam  Vitals reviewed.   Constitutional:       General: She is not in acute distress.     Appearance: Normal appearance.   HENT:      Head: Normocephalic and atraumatic.   Eyes:      Extraocular Movements: Extraocular movements intact.      Pupils: Pupils are equal, round, and reactive to light.   Pulmonary:      Effort: Pulmonary effort is normal.   Abdominal:      General: Abdomen is flat. "      Palpations: Abdomen is soft.   Musculoskeletal:      Cervical back: Normal range of motion.   Skin:     General: Skin is warm and dry.   Neurological:      General: No focal deficit present.      Mental Status: She is alert and oriented to person, place, and time.   Psychiatric:         Mood and Affect: Mood normal.         Behavior: Behavior normal.          Result Review :  The following data was reviewed by: Howie Campos MD on 05/16/2023:  Labs: Last Creatinine   Data reviewed: Radiologic studies CT Chest, PET CT            Diagnoses and all orders for this visit:    1. Malignant neoplasm of lower lobe of right lung (Primary)        Assessment:    Darling Morgan is a 72 y.o. female with newly diagnosed right lower lobe poorly differentiated non-small cell lung cancer.  The patient is not a surgical candidate due to marginal PFTs per Dr. Soriano.    I met with the patient in consultation and discussed the risk, benefits, side effects, logistics of radiation treatment planning delivery.  I answered all the patient's questions to her satisfaction.  General conversation the patient wished to pursue definitive SBRT for her right lower lobe malignancy.  Plan for CT simulation later this week.    Of note the patient has a mildly hypermetabolic right hilar lymph node with borderline enlargement.  This lymph node is stable in size compared to his CT chest from June 2022.  We will watch this lymph node closely on surveillance CT chest imaging.    The patient is a current smoker and I discussed my recommendation for smoking cessation counseling.  The patient was not interested in this at this time and is going to work on smoking cessation independently.    Plan:     -Plan for definitive SBRT for a right lower lobe biopsy-proven non-small cell lung cancer       I spent 60 minutes caring for Darling on this date of service. This time includes time spent by me in the following activities:preparing for the visit, reviewing tests,  obtaining and/or reviewing a separately obtained history, referring and communicating with other health care professionals , documenting information in the medical record, independently interpreting results and communicating that information with the patient/family/caregiver and care coordination  Follow Up   No follow-ups on file.  Patient was given instructions and counseling regarding her condition or for health maintenance advice. Please see specific information pulled into the AVS if appropriate.     Howie Campos MD

## 2023-05-16 NOTE — PROGRESS NOTES
[place] [facility] [place] met with Dr. Campos. His office will call her to schedule SBRT for her right lower lobe biopsy-proven non-small cell lung cancer.     The patient is doing well. She is please that she will not need chemotherapy.  The patient denied any supportive care/resource needs. All questions answered.     I will f/u in several weeks. She will call as needed

## 2023-05-16 NOTE — PROGRESS NOTES
REFERRING PROVIDER:    Lazaro Soriano MD PhD  [address] [place] [address]    REASON FOR CONSULTATION:  ***    HISTORY OF PRESENT ILLNESS:  Darling Morgan is a 72 y.o. female who is referred today ***        Past Medical History:   Diagnosis Date   • AC joint arthropathy    • ADD (attention deficit disorder)    • Anxiety    • Arthritis    • Atrial fibrillation     DR RENDON   • Fuentes esophagus    • Bell's palsy    • Bigeminy    • Cancer of right lung     DR. LAZARO SORIANO, NEW DIAGNOSIS   • Chronic pain     BILATERAL LEGS   • Depression    • Dysphagia     FEELS LIKE SOMETHING STUCK IN HER THROAT AT TIMES   • Dysrhythmia 06/2022    Afib   • Emphysema lung    • GERD (gastroesophageal reflux disease)    • Hemorrhoid    • History of COVID-19     6/8/2022 IN EPIC   • History of fusion of cervical spine    • History of stomach ulcers    • Hyperlipidemia    • Hypertension    • Insomnia    • Lung nodule     RIGHT LOBE   • Meningioma    • On anticoagulant therapy    • Postmenopausal status 08/29/2013   • Skin abrasion     ON LEFT FOREARM FROM NAIL 1 WEEK AGO SLIGHTLY RED AROUND IT   • Staphylococcal infection     AFTER NECK SURGERY IN 1980'S       Past Surgical History:   Procedure Laterality Date   • ANTERIOR CERVICAL DISCECTOMY W/ FUSION     • BACK SURGERY      LUMBAR LAMINECTOMY   • CARDIAC CATHETERIZATION N/A 05/26/2017    Procedure: Coronary angiography;  Surgeon: William Raphael MD;  Location:  [facility];  Service:    • CARDIAC CATHETERIZATION N/A 05/26/2017    Procedure: Left heart cath;  Surgeon: William Raphael MD;  Location:  [facility];  Service:    • CARDIAC CATHETERIZATION N/A 05/26/2017    Procedure: Left ventriculography;  Surgeon: William Raphael MD;  Location:  [facility];  Service:    • COLONOSCOPY  2008   • COLONOSCOPY N/A 5/1/2023    Procedure: COLONOSCOPY into cecum and TI with hot and cold snare polypectomies;  Surgeon: Geena Miller MD;   Location: [facility] ENDOSCOPY;  Service: Gastroenterology;  Laterality: N/A;  pre- abnormal PET scan  post- diverticulosis, polyps, hemorrhoids   • D & C AND LAPAROSCOPY      WITH BLOOD TRANSFUSION.   • HYSTERECTOMY     • INCISION AND DRAINAGE ABSCESS POSTERIOR CERVICALSPINE      FROM INFECTION AFTER CERVICAL SPINE SURGERY IN 1980'S   • NECK SURGERY  1986    for herniated disk and got staph infection.   • THORACOSCOPY Right 06/29/2022    Procedure: BRONCHOSCOPY, RIGHT VIDEO ASSISTED THORACOSCOPY WITH DAVINCI ROBOT WITH RIGHT LOWER LOBE WEDGE RESECTION x2 WITH INTERCOSTAL NERVE BLOCKS AND MEDIASTINAL LYMPHADENECTOMY;  Surgeon: Bharathi Quinones III, MD;  Location: [facility] MAIN OR;  Service: Robotics - DaVinci;  Laterality: Right;   • VEIN LIGATION AND STRIPPING BILATERAL Right        SOCIAL HISTORY:   reports that she has been smoking cigarettes. She has been smoking an average of .5 packs per day. She has never used smokeless tobacco. She reports current alcohol use. She reports that she does not use drugs.    FAMILY HISTORY:  family history includes Brain cancer in her father; Cancer in an other family member; Coronary artery disease in her maternal grandfather; Other in an other family member; Ovarian cancer in her mother; Thyroid disease in her daughter and another family member.    ALLERGIES:  No Known Allergies    MEDICATIONS:  The medication list has been reviewed with the patient by the medical assistant, and the list has been updated in the electronic medical record, which I reviewed.  Medication dosages and frequencies were confirmed to be accurate.    REVIEW OF SYSTEMS  Review of Systems    PHYSICAL EXAMINATION  There were no vitals filed for this visit.    Physical Exam    DIAGNOSTIC DATA:      IMAGING:    ***    ASSESSMENT:    This is a 72 y.o. female with:    *  •     *  •     *  •     PLAN:   1. ***    ORDERS PLACED DURING THIS ENCOUNTER  No orders of the defined types were placed in this encounter.

## 2023-05-19 ENCOUNTER — HOSPITAL ENCOUNTER (OUTPATIENT)
Dept: RADIATION ONCOLOGY | Facility: HOSPITAL | Age: 73
Setting detail: RADIATION/ONCOLOGY SERIES
End: 2023-05-19
Payer: MEDICARE

## 2023-05-19 DIAGNOSIS — R91.1 LUNG NODULE: Primary | ICD-10-CM

## 2023-05-19 PROCEDURE — 77334 RADIATION TREATMENT AID(S): CPT | Performed by: STUDENT IN AN ORGANIZED HEALTH CARE EDUCATION/TRAINING PROGRAM

## 2023-05-19 PROCEDURE — 77470 SPECIAL RADIATION TREATMENT: CPT | Performed by: STUDENT IN AN ORGANIZED HEALTH CARE EDUCATION/TRAINING PROGRAM

## 2023-05-19 PROCEDURE — 77263 THER RADIOLOGY TX PLNG CPLX: CPT | Performed by: STUDENT IN AN ORGANIZED HEALTH CARE EDUCATION/TRAINING PROGRAM

## 2023-05-19 RX ORDER — NICOTINE 21 MG/24HR
1 PATCH, TRANSDERMAL 24 HOURS TRANSDERMAL EVERY 24 HOURS
Qty: 31 PATCH | Refills: 2 | Status: SHIPPED | OUTPATIENT
Start: 2023-05-19 | End: 2023-06-19

## 2023-05-23 PROCEDURE — 77338 DESIGN MLC DEVICE FOR IMRT: CPT | Performed by: STUDENT IN AN ORGANIZED HEALTH CARE EDUCATION/TRAINING PROGRAM

## 2023-05-23 PROCEDURE — 77293 RESPIRATOR MOTION MGMT SIMUL: CPT | Performed by: STUDENT IN AN ORGANIZED HEALTH CARE EDUCATION/TRAINING PROGRAM

## 2023-05-23 PROCEDURE — 77300 RADIATION THERAPY DOSE PLAN: CPT | Performed by: STUDENT IN AN ORGANIZED HEALTH CARE EDUCATION/TRAINING PROGRAM

## 2023-05-23 PROCEDURE — 77301 RADIOTHERAPY DOSE PLAN IMRT: CPT | Performed by: STUDENT IN AN ORGANIZED HEALTH CARE EDUCATION/TRAINING PROGRAM

## 2023-05-24 ENCOUNTER — RADIATION ONCOLOGY WEEKLY ASSESSMENT (OUTPATIENT)
Dept: RADIATION ONCOLOGY | Facility: HOSPITAL | Age: 73
End: 2023-05-24
Payer: MEDICARE

## 2023-05-24 ENCOUNTER — HOSPITAL ENCOUNTER (OUTPATIENT)
Dept: RADIATION ONCOLOGY | Facility: HOSPITAL | Age: 73
Discharge: HOME | End: 2023-05-24

## 2023-05-24 VITALS
DIASTOLIC BLOOD PRESSURE: 67 MMHG | WEIGHT: 123 LBS | HEART RATE: 64 BPM | OXYGEN SATURATION: 99 % | BODY MASS INDEX: 19.26 KG/M2 | SYSTOLIC BLOOD PRESSURE: 126 MMHG

## 2023-05-24 DIAGNOSIS — C34.91 PRIMARY ADENOCARCINOMA OF RIGHT LUNG: Primary | ICD-10-CM

## 2023-05-24 LAB
RAD ONC ARIA COURSE ID: NORMAL
RAD ONC ARIA COURSE INTENT: NORMAL
RAD ONC ARIA COURSE LAST TREATMENT DATE: NORMAL
RAD ONC ARIA COURSE START DATE: NORMAL
RAD ONC ARIA COURSE TREATMENT ELAPSED DAYS: 0
RAD ONC ARIA FIRST TREATMENT DATE: NORMAL
RAD ONC ARIA PLAN FRACTIONS TREATED TO DATE: 1
RAD ONC ARIA PLAN ID: NORMAL
RAD ONC ARIA PLAN PRESCRIBED DOSE PER FRACTION: 12 GY
RAD ONC ARIA PLAN PRIMARY REFERENCE POINT: NORMAL
RAD ONC ARIA PLAN TOTAL FRACTIONS PRESCRIBED: 4
RAD ONC ARIA PLAN TOTAL PRESCRIBED DOSE: 4800 CGY
RAD ONC ARIA REFERENCE POINT DOSAGE GIVEN TO DATE: 12 GY
RAD ONC ARIA REFERENCE POINT ID: NORMAL
RAD ONC ARIA REFERENCE POINT SESSION DOSAGE GIVEN: 12 GY

## 2023-05-24 PROCEDURE — 77373 STRTCTC BDY RAD THER TX DLVR: CPT | Performed by: STUDENT IN AN ORGANIZED HEALTH CARE EDUCATION/TRAINING PROGRAM

## 2023-05-24 NOTE — PROGRESS NOTES
Radiation Oncology  On-Treatment Note      Patient: Darling Morgan    MRN: [id number]    Attending Physician: Howie Campos MD    Diagnosis:     ICD-10-CM ICD-9-CM   1. Primary adenocarcinoma of right lung  C34.91 162.9       Radiation Therapy Visit:  Continue radiation therapy, Dosimetry plan remains acceptable, Films reviewed and remains acceptable, Pain assessed, Pain management planned, Radiation dose schedule reviewed and remains acceptable, Radiation technique remains acceptable and Symptoms within expected range    Radiation Treatments     Active   Plans   RLL SBRT 48G   Most recent treatment: Dose planned: 1,200 cGy (fraction 1 on 5/24/2023)   Total: Dose planned: 4,800 cGy (4 fractions)   Elapsed Days: 0      Reference Points   RXPTVRLL   Most recent treatment: Dose given: 1,200 cGy (on 5/24/2023)   Total: Dose given: 1,200 cGy   Elapsed Days: 0                    Physical Examination:  Vitals: Blood pressure 126/67, pulse 64, weight 55.8 kg (123 lb), SpO2 99 %.  Vitals:    05/24/23 1347   BP: 126/67   Pulse: 64   SpO2: 99%   Weight: 55.8 kg (123 lb)     Pain Score    05/24/23 1347   PainSc: 0-No pain       Restricted in physically strenuous activity but ambulatory and able to carry out work of a light or sedentary nature, e.g., light house work, office work = 1    We examined the relevant areas: yes  Findings are within the expected range for this stage of treatment: yes  -------------------------------------------------------------------------------------------------------------------    ACTION ITEMS:  Patient tolerating treatment well and as expected for this stage in their treatment and Continue radiation therapy as planned    Estimated Completion Date: 5/30/2023    Follow up in 3 months with CT Chest      Howie Campos MD  Radiation Oncology

## 2023-05-25 ENCOUNTER — HOSPITAL ENCOUNTER (OUTPATIENT)
Dept: RADIATION ONCOLOGY | Facility: HOSPITAL | Age: 73
Discharge: HOME | End: 2023-05-25

## 2023-05-25 LAB
RAD ONC ARIA COURSE ID: NORMAL
RAD ONC ARIA COURSE INTENT: NORMAL
RAD ONC ARIA COURSE LAST TREATMENT DATE: NORMAL
RAD ONC ARIA COURSE START DATE: NORMAL
RAD ONC ARIA COURSE TREATMENT ELAPSED DAYS: 1
RAD ONC ARIA FIRST TREATMENT DATE: NORMAL
RAD ONC ARIA PLAN FRACTIONS TREATED TO DATE: 2
RAD ONC ARIA PLAN ID: NORMAL
RAD ONC ARIA PLAN PRESCRIBED DOSE PER FRACTION: 12 GY
RAD ONC ARIA PLAN PRIMARY REFERENCE POINT: NORMAL
RAD ONC ARIA PLAN TOTAL FRACTIONS PRESCRIBED: 4
RAD ONC ARIA PLAN TOTAL PRESCRIBED DOSE: 4800 CGY
RAD ONC ARIA REFERENCE POINT DOSAGE GIVEN TO DATE: 24 GY
RAD ONC ARIA REFERENCE POINT ID: NORMAL
RAD ONC ARIA REFERENCE POINT SESSION DOSAGE GIVEN: 12 GY

## 2023-05-25 PROCEDURE — 77373 STRTCTC BDY RAD THER TX DLVR: CPT | Performed by: STUDENT IN AN ORGANIZED HEALTH CARE EDUCATION/TRAINING PROGRAM

## 2023-05-26 PROCEDURE — 77336 RADIATION PHYSICS CONSULT: CPT | Performed by: STUDENT IN AN ORGANIZED HEALTH CARE EDUCATION/TRAINING PROGRAM

## 2023-05-26 PROCEDURE — 77373 STRTCTC BDY RAD THER TX DLVR: CPT | Performed by: STUDENT IN AN ORGANIZED HEALTH CARE EDUCATION/TRAINING PROGRAM

## 2023-05-28 LAB
RAD ONC ARIA COURSE ID: NORMAL
RAD ONC ARIA COURSE INTENT: NORMAL
RAD ONC ARIA COURSE LAST TREATMENT DATE: NORMAL
RAD ONC ARIA COURSE START DATE: NORMAL
RAD ONC ARIA COURSE TREATMENT ELAPSED DAYS: 2
RAD ONC ARIA FIRST TREATMENT DATE: NORMAL
RAD ONC ARIA PLAN FRACTIONS TREATED TO DATE: 3
RAD ONC ARIA PLAN ID: NORMAL
RAD ONC ARIA PLAN PRESCRIBED DOSE PER FRACTION: 12 GY
RAD ONC ARIA PLAN PRIMARY REFERENCE POINT: NORMAL
RAD ONC ARIA PLAN TOTAL FRACTIONS PRESCRIBED: 4
RAD ONC ARIA PLAN TOTAL PRESCRIBED DOSE: 4800 CGY
RAD ONC ARIA REFERENCE POINT DOSAGE GIVEN TO DATE: 36 GY
RAD ONC ARIA REFERENCE POINT ID: NORMAL
RAD ONC ARIA REFERENCE POINT SESSION DOSAGE GIVEN: 12 GY

## 2023-05-30 ENCOUNTER — HOSPITAL ENCOUNTER (OUTPATIENT)
Dept: RADIATION ONCOLOGY | Facility: HOSPITAL | Age: 73
Discharge: HOME | End: 2023-05-30

## 2023-05-30 ENCOUNTER — TELEPHONE (OUTPATIENT)
Dept: RADIATION ONCOLOGY | Facility: HOSPITAL | Age: 73
End: 2023-05-30

## 2023-05-30 ENCOUNTER — TREATMENT (OUTPATIENT)
Dept: RADIATION ONCOLOGY | Facility: HOSPITAL | Age: 73
End: 2023-05-30
Payer: MEDICARE

## 2023-05-30 DIAGNOSIS — C34.91 PRIMARY ADENOCARCINOMA OF RIGHT LUNG: Primary | ICD-10-CM

## 2023-05-30 LAB
RAD ONC ARIA COURSE END DATE: NORMAL
RAD ONC ARIA COURSE ID: NORMAL
RAD ONC ARIA COURSE ID: NORMAL
RAD ONC ARIA COURSE INTENT: NORMAL
RAD ONC ARIA COURSE INTENT: NORMAL
RAD ONC ARIA COURSE LAST TREATMENT DATE: NORMAL
RAD ONC ARIA COURSE LAST TREATMENT DATE: NORMAL
RAD ONC ARIA COURSE START DATE: NORMAL
RAD ONC ARIA COURSE START DATE: NORMAL
RAD ONC ARIA COURSE TREATMENT ELAPSED DAYS: 6
RAD ONC ARIA COURSE TREATMENT ELAPSED DAYS: 6
RAD ONC ARIA FIRST TREATMENT DATE: NORMAL
RAD ONC ARIA FIRST TREATMENT DATE: NORMAL
RAD ONC ARIA PLAN FRACTIONS TREATED TO DATE: 4
RAD ONC ARIA PLAN FRACTIONS TREATED TO DATE: 4
RAD ONC ARIA PLAN ID: NORMAL
RAD ONC ARIA PLAN ID: NORMAL
RAD ONC ARIA PLAN NAME: NORMAL
RAD ONC ARIA PLAN PRESCRIBED DOSE PER FRACTION: 12 GY
RAD ONC ARIA PLAN PRESCRIBED DOSE PER FRACTION: 12 GY
RAD ONC ARIA PLAN PRIMARY REFERENCE POINT: NORMAL
RAD ONC ARIA PLAN PRIMARY REFERENCE POINT: NORMAL
RAD ONC ARIA PLAN TOTAL FRACTIONS PRESCRIBED: 4
RAD ONC ARIA PLAN TOTAL FRACTIONS PRESCRIBED: 4
RAD ONC ARIA PLAN TOTAL PRESCRIBED DOSE: 4800 CGY
RAD ONC ARIA PLAN TOTAL PRESCRIBED DOSE: 4800 CGY
RAD ONC ARIA REFERENCE POINT DOSAGE GIVEN TO DATE: 48 GY
RAD ONC ARIA REFERENCE POINT DOSAGE GIVEN TO DATE: 48 GY
RAD ONC ARIA REFERENCE POINT ID: NORMAL
RAD ONC ARIA REFERENCE POINT ID: NORMAL
RAD ONC ARIA REFERENCE POINT SESSION DOSAGE GIVEN: 12 GY

## 2023-05-30 PROCEDURE — 77373 STRTCTC BDY RAD THER TX DLVR: CPT | Performed by: STUDENT IN AN ORGANIZED HEALTH CARE EDUCATION/TRAINING PROGRAM

## 2023-05-31 DIAGNOSIS — R91.1 LUNG NODULE: Primary | ICD-10-CM

## 2023-06-05 ENCOUNTER — TELEPHONE (OUTPATIENT)
Dept: OTHER | Facility: HOSPITAL | Age: 73
End: 2023-06-05
Payer: MEDICARE

## 2023-06-05 NOTE — TELEPHONE ENCOUNTER
[facility] MULTIDISCIPLINARY CLINIC  SURVIVORSHIP SERVICES CARE COORDINATION NOTE  PHONE      Call placed to patient  RE: open referral to survivorship treatment summary visit.    Spoke with  patient.  Would like to call back after she checks her schedule.      Introduced myself and reviewed purpose and goals of survivorship treatment summary visit as well as what to expect..    Patient mailed survivorship program informational brochure.    Patient encouraged to call the office at any point for additional information, resources or support.

## 2023-06-12 ENCOUNTER — PATIENT OUTREACH (OUTPATIENT)
Dept: OTHER | Facility: HOSPITAL | Age: 73
End: 2023-06-12
Payer: MEDICARE

## 2023-06-12 RX ORDER — METOPROLOL TARTRATE 50 MG/1
75 TABLET, FILM COATED ORAL EVERY 12 HOURS SCHEDULED
Qty: 135 TABLET | Refills: 1 | Status: SHIPPED | OUTPATIENT
Start: 2023-06-12 | End: 2023-06-19 | Stop reason: SDUPTHER

## 2023-06-12 NOTE — PROGRESS NOTES
Called the patient. She states she is doing ok. The patient is tired related to radiation, which is improving. She states her breathing was not affected.      She has a CT scan scheduled 8/30 although doesn't have a f/u with Dr. Campos yet.  I will send a message to Dr. Campos's nurse; someone from radiation oncology should call her for a follow up appt.     The patient denies any resource or supportive care needs at this time.    We discussed Survivorship visit. She said she received information in the mail about the visit. The patient is still considering whether she wants the visit or not.    I will call in a few months after her scan and f/u visits.  The patient will call as needed.    IN-basket msg to Dr. Andres Mehta's nurse about needing f/u appt with Dr. Campos

## 2023-06-13 NOTE — PROGRESS NOTES
Radiation Treatment Summary Note      Patient Name: Darling Morgan  : 1950    Attending Provider: Howie Campos MD      Diagnosis:     ICD-10-CM ICD-9-CM   1. Primary adenocarcinoma of right lung  C34.91 162.9       Radiation Start Date: 2023    Radiation Completion Date: 2023      Prescription:     Site: RLL  Laterality: Right  Total Dose: 4800cGy  Dose per Fraction: 1200cGy  Total Fractions: 4  Daily or BID: Daily  Modality: Photon  Technique: SBRT (2-5fx)  Bolus: No    Final Delivered Dose Deviated From Initially Prescribed Dose: No    Concurrent Chemotherapy: No    Patient Tolerated Treatment Without Unexpected Side Effects/Complications: Yes    ECOG: Restricted in physically strenuous activity but ambulatory and able to carry out work of a light or sedentary nature, e.g., light house work, office work = 1    Pain Management Plan: None Indicated/PRN OTC    Follow-Up Plan: 3 months    Imaging Ordered for Follow-Up: Yes, describe: CT Chest        Howie Campos MD

## 2023-06-19 ENCOUNTER — OFFICE VISIT (OUTPATIENT)
Dept: CARDIOLOGY | Facility: CLINIC | Age: 73
End: 2023-06-19
Payer: MEDICARE

## 2023-06-19 VITALS
HEART RATE: 54 BPM | WEIGHT: 125.6 LBS | SYSTOLIC BLOOD PRESSURE: 130 MMHG | HEIGHT: 67 IN | OXYGEN SATURATION: 100 % | BODY MASS INDEX: 19.71 KG/M2 | DIASTOLIC BLOOD PRESSURE: 82 MMHG

## 2023-06-19 DIAGNOSIS — I10 PRIMARY HYPERTENSION: ICD-10-CM

## 2023-06-19 DIAGNOSIS — I48.0 PAROXYSMAL ATRIAL FIBRILLATION: Primary | ICD-10-CM

## 2023-06-19 PROCEDURE — 3079F DIAST BP 80-89 MM HG: CPT | Performed by: NURSE PRACTITIONER

## 2023-06-19 PROCEDURE — 1160F RVW MEDS BY RX/DR IN RCRD: CPT | Performed by: NURSE PRACTITIONER

## 2023-06-19 PROCEDURE — 99213 OFFICE O/P EST LOW 20 MIN: CPT | Performed by: NURSE PRACTITIONER

## 2023-06-19 PROCEDURE — 93000 ELECTROCARDIOGRAM COMPLETE: CPT | Performed by: NURSE PRACTITIONER

## 2023-06-19 PROCEDURE — 1159F MED LIST DOCD IN RCRD: CPT | Performed by: NURSE PRACTITIONER

## 2023-06-19 PROCEDURE — 3075F SYST BP GE 130 - 139MM HG: CPT | Performed by: NURSE PRACTITIONER

## 2023-06-19 RX ORDER — AMLODIPINE BESYLATE 2.5 MG/1
2.5 TABLET ORAL
Qty: 90 TABLET | Refills: 3 | Status: SHIPPED | OUTPATIENT
Start: 2023-06-19

## 2023-06-19 RX ORDER — ATORVASTATIN CALCIUM 40 MG/1
40 TABLET, FILM COATED ORAL NIGHTLY
Qty: 90 TABLET | Refills: 3 | Status: SHIPPED | OUTPATIENT
Start: 2023-06-19

## 2023-06-19 RX ORDER — METOPROLOL TARTRATE 50 MG/1
75 TABLET, FILM COATED ORAL EVERY 12 HOURS SCHEDULED
Qty: 135 TABLET | Refills: 3 | Status: SHIPPED | OUTPATIENT
Start: 2023-06-19

## 2023-06-19 NOTE — PROGRESS NOTES
"    CARDIOLOGY        Patient Name: Darling Morgan  :1950  Age: 72 y.o.  Primary Cardiologist: Ketan Fields MD  Encounter Provider:  SUKHJINDER Nina    Date of Service: 23      CHIEF COMPLAINT / REASON FOR OFFICE VISIT     Paroxysmal atrial fibrillation with rapid ventricular respo      HISTORY OF PRESENT ILLNESS       HPI  Darling Morgan is a 72 y.o. female who presents today for 3 month reevaluation.     Pt has a  history significant for PAF.    At last assessment with Dr. Fields patient was having increased episodes of atrial fibrillation.  At that time beta-blocker dosing was increased.    Patient presents today stating that she has felt slightly better since her medications were adjusted.  She does have episodes of lightheadedness that last for several minutes at a time and patient has to sit down and rest.  She is unsure if these episodes are related to atrial fibrillation.  She does note associated heart palpitations and heart racing.  Denies chest pain, dyspnea, dyspnea with exertion, fatigue.    The following portions of the patient's history were reviewed and updated as appropriate: allergies, current medications, past family history, past medical history, past social history, past surgical history and problem list.      VITAL SIGNS     Visit Vitals  /82 (BP Location: Left arm)   Pulse 54   Ht 170.2 cm (67\")   Wt 57 kg (125 lb 9.6 oz)   SpO2 100%   BMI 19.67 kg/m²         Wt Readings from Last 3 Encounters:   23 57 kg (125 lb 9.6 oz)   23 55.8 kg (123 lb)   23 54.9 kg (121 lb)     Body mass index is 19.67 kg/m².      REVIEW OF SYSTEMS   Review of Systems   Constitutional: Negative for chills, fever, weight gain and weight loss.   Cardiovascular:  Positive for irregular heartbeat and palpitations. Negative for leg swelling.   Respiratory:  Negative for cough, snoring and wheezing.    Hematologic/Lymphatic: Negative for bleeding problem. Does not bruise/bleed " easily.   Skin:  Negative for color change.   Musculoskeletal:  Negative for falls, joint pain and myalgias.   Gastrointestinal:  Negative for melena.   Genitourinary:  Negative for hematuria.   Neurological:  Positive for light-headedness. Negative for excessive daytime sleepiness.   Psychiatric/Behavioral:  Negative for depression. The patient is not nervous/anxious.          PHYSICAL EXAMINATION     Vitals and nursing note reviewed.   Constitutional:       Appearance: Normal appearance. Well-developed.   Eyes:      Conjunctiva/sclera: Conjunctivae normal.   Neck:      Vascular: No carotid bruit.   Pulmonary:      Breath sounds: Normal breath sounds.   Cardiovascular:      Normal rate. Regular rhythm. Normal S1 with normal intensity. Normal S2 with normal intensity.       Murmurs: There is no murmur.      No gallop.  No click. No rub.   Edema:     Peripheral edema absent.   Musculoskeletal: Normal range of motion. Skin:     General: Skin is warm and dry.   Neurological:      Mental Status: Alert and oriented to person, place, and time.      GCS: GCS eye subscore is 4. GCS verbal subscore is 5. GCS motor subscore is 6.   Psychiatric:         Speech: Speech normal.         Behavior: Behavior normal.         Thought Content: Thought content normal.         Judgment: Judgment normal.         REVIEWED DATA       ECG 12 Lead    Date/Time: 6/19/2023 1:07 PM  Performed by: Geena Prasad APRN  Authorized by: Geena Prasad APRN   Comparison: compared with previous ECG from 8/5/2022  Rhythm: sinus bradycardia  Rate: bradycardic  BPM: 54  ST Segments: ST segments normal  T Waves: T waves normal  QRS axis: normal    Clinical impression: normal ECG        Cardiac Procedures:  Cardiac catheterization 5/26/2017.  Left main is normal.  Proximal LAD is normal.  Mid to distal LAD with 20% diffuse stenosis.  Diagonal branches are normal.  Circumflex with 30-40% proximal stenosis.  RCA is large with 10-20%  stenosis.  Echocardiogram 6/25/2022.  LVEF 56-60%.  Grade 2 diastolic dysfunction.  Normal RV cavity size and systolic function.  Left atrial cavity is mild to moderately dilated.  Cannot rule out bicuspid aortic valve.  Aortic valve is mildly calcified.  Moderate mitral valve regurgitation.  Mild tricuspid valve regurgitation.  Calculated RVSP 41 mmHg.      BUN   Date Value Ref Range Status   07/04/2022 11 8 - 23 mg/dL Final     Creatinine   Date Value Ref Range Status   07/04/2022 0.67 0.57 - 1.00 mg/dL Final     Potassium   Date Value Ref Range Status   07/04/2022 3.8 3.5 - 5.2 mmol/L Final     ALT (SGPT)   Date Value Ref Range Status   06/25/2022 17 1 - 33 U/L Final     AST (SGOT)   Date Value Ref Range Status   06/25/2022 18 1 - 32 U/L Final           ASSESSMENT & PLAN     Diagnoses and all orders for this visit:    1. Paroxysmal atrial fibrillation (Primary)  Heart rate currently controlled.  Patient in sinus rhythm on ECG.  Continue metoprolol tartrate 75 mg twice daily  Anticoagulated with Xarelto  Patient is having episodes of lightheadedness that occur approximately weekly.  Place ZIO monitor to see if patient is having increased A-fib burden.  -     ECG 12 Lead  -     Holter Monitor - 72 Hour Up To 15 Days; Future    2. Primary hypertension  BP controlled in clinic today.  Continue current regimen    Other orders  -     amLODIPine (NORVASC) 2.5 MG tablet; Take 1 tablet by mouth Daily.  Dispense: 90 tablet; Refill: 3  -     atorvastatin (LIPITOR) 40 MG tablet; Take 1 tablet by mouth Every Night.  Dispense: 90 tablet; Refill: 3  -     metoprolol tartrate (LOPRESSOR) 50 MG tablet; Take 1.5 tablets by mouth Every 12 (Twelve) Hours.  Dispense: 135 tablet; Refill: 3  -     rivaroxaban (Xarelto) 20 MG tablet; Take 1 tablet by mouth Daily With Dinner. Indications: Atrial Fibrillation  Dispense: 90 tablet; Refill: 3      Follow-up to be arranged after above-mentioned testing    Future Appointments            Provider Department Center     8/30/2023 11:45 AM [facility] 2 [facility]     9/6/2023 11:40 AM  [facility] BILLING ONLY [facility] RAD [facility]C [facility]     9/6/2023 11:45 AM Howie Campos MD [facility] RADIATION ONCOLOGY                     MEDICATIONS         Discharge Medications            Accurate as of June 19, 2023  4:19 PM. If you have any questions, ask your nurse or doctor.                Changes to Medications        Instructions Start Date   rivaroxaban 20 MG tablet  Commonly known as: Xarelto  What changed:   when to take this  additional instructions   20 mg, Oral, Daily With Dinner             Continue These Medications        Instructions Start Date   amLODIPine 2.5 MG tablet  Commonly known as: NORVASC   2.5 mg, Oral, Every 24 Hours Scheduled      atorvastatin 40 MG tablet  Commonly known as: LIPITOR   40 mg, Oral, Nightly      EXCEDRIN ASPIRIN FREE PO   1 tablet, Oral, As Needed      metoprolol tartrate 50 MG tablet  Commonly known as: LOPRESSOR   75 mg, Oral, Every 12 Hours Scheduled      nicotine 21 MG/24HR patch  Commonly known as: NICODERM CQ   1 patch, Transdermal, Every 24 Hours      omeprazole 20 MG capsule  Commonly known as: priLOSEC   40 mg, Oral, Daily, TAKES TWO CAPSULE BY MOUTH DAILY       potassium chloride 20 MEQ CR tablet  Commonly known as: K-DUR,KLOR-CON   20 mEq, Oral, Daily      zolpidem 10 MG tablet  Commonly known as: AMBIEN   10 mg, Oral, Nightly                   **Dragon Disclaimer:   Much of this encounter note is an electronic transcription/translation of spoken language to printed text. The electronic translation of spoken language may permit erroneous, or at times, nonsensical words or phrases to be inadvertently transcribed. Although I have reviewed the note for such errors, some may still exist.

## 2023-07-28 ENCOUNTER — TRANSCRIBE ORDERS (OUTPATIENT)
Dept: ADMINISTRATIVE | Facility: HOSPITAL | Age: 73
End: 2023-07-28
Payer: MEDICARE

## 2023-07-28 DIAGNOSIS — I65.23 CAROTID ARTERY STENOSIS WITHOUT CEREBRAL INFARCTION, BILATERAL: Primary | ICD-10-CM

## 2023-08-09 ENCOUNTER — HOSPITAL ENCOUNTER (OUTPATIENT)
Dept: CARDIOLOGY | Facility: HOSPITAL | Age: 73
Discharge: HOME | End: 2023-08-09
Admitting: FAMILY MEDICINE
Payer: MEDICARE

## 2023-08-09 DIAGNOSIS — I65.23 CAROTID ARTERY STENOSIS WITHOUT CEREBRAL INFARCTION, BILATERAL: ICD-10-CM

## 2023-08-09 LAB
BH CV XLRA MEAS LEFT DIST CCA EDV: 15.5 CM/SEC
BH CV XLRA MEAS LEFT DIST CCA PSV: 48.3 CM/SEC
BH CV XLRA MEAS LEFT DIST ICA EDV: -27.5 CM/SEC
BH CV XLRA MEAS LEFT DIST ICA PSV: -61.3 CM/SEC
BH CV XLRA MEAS LEFT ICA/CCA RATIO: -1.41
BH CV XLRA MEAS LEFT MID ICA EDV: -24.4 CM/SEC
BH CV XLRA MEAS LEFT MID ICA PSV: -68 CM/SEC
BH CV XLRA MEAS LEFT PROX CCA EDV: 22.4 CM/SEC
BH CV XLRA MEAS LEFT PROX CCA PSV: 75.8 CM/SEC
BH CV XLRA MEAS LEFT PROX ECA EDV: -18.9 CM/SEC
BH CV XLRA MEAS LEFT PROX ECA PSV: -68 CM/SEC
BH CV XLRA MEAS LEFT PROX ICA EDV: -20.5 CM/SEC
BH CV XLRA MEAS LEFT PROX ICA PSV: -46.7 CM/SEC
BH CV XLRA MEAS LEFT PROX SCLA PSV: 82.7 CM/SEC
BH CV XLRA MEAS LEFT VERTEBRAL A EDV: 6.5 CM/SEC
BH CV XLRA MEAS LEFT VERTEBRAL A PSV: 15.1 CM/SEC
BH CV XLRA MEAS RIGHT DIST CCA EDV: -18.8 CM/SEC
BH CV XLRA MEAS RIGHT DIST CCA PSV: -49.3 CM/SEC
BH CV XLRA MEAS RIGHT DIST ICA EDV: -39.9 CM/SEC
BH CV XLRA MEAS RIGHT DIST ICA PSV: -91.5 CM/SEC
BH CV XLRA MEAS RIGHT ICA/CCA RATIO: 1.86
BH CV XLRA MEAS RIGHT MID ICA EDV: -35 CM/SEC
BH CV XLRA MEAS RIGHT MID ICA PSV: -73.1 CM/SEC
BH CV XLRA MEAS RIGHT PROX CCA EDV: 16.1 CM/SEC
BH CV XLRA MEAS RIGHT PROX CCA PSV: 58.9 CM/SEC
BH CV XLRA MEAS RIGHT PROX ECA EDV: -15.3 CM/SEC
BH CV XLRA MEAS RIGHT PROX ECA PSV: -81.3 CM/SEC
BH CV XLRA MEAS RIGHT PROX ICA EDV: -20.2 CM/SEC
BH CV XLRA MEAS RIGHT PROX ICA PSV: -42.6 CM/SEC
BH CV XLRA MEAS RIGHT PROX SCLA PSV: 61.3 CM/SEC
BH CV XLRA MEAS RIGHT VERTEBRAL A EDV: -18.1 CM/SEC
BH CV XLRA MEAS RIGHT VERTEBRAL A PSV: -55 CM/SEC

## 2023-08-09 PROCEDURE — 93880 EXTRACRANIAL BILAT STUDY: CPT

## 2023-08-30 ENCOUNTER — HOSPITAL ENCOUNTER (OUTPATIENT)
Dept: CT IMAGING | Facility: HOSPITAL | Age: 73
Discharge: HOME | End: 2023-08-30
Admitting: STUDENT IN AN ORGANIZED HEALTH CARE EDUCATION/TRAINING PROGRAM
Payer: MEDICARE

## 2023-08-30 DIAGNOSIS — C34.91 PRIMARY ADENOCARCINOMA OF RIGHT LUNG: ICD-10-CM

## 2023-08-30 PROCEDURE — 71250 CT THORAX DX C-: CPT

## 2023-09-06 ENCOUNTER — PATIENT OUTREACH (OUTPATIENT)
Dept: OTHER | Facility: HOSPITAL | Age: 73
End: 2023-09-06
Payer: MEDICARE

## 2023-09-06 ENCOUNTER — HOSPITAL ENCOUNTER (OUTPATIENT)
Dept: RADIATION ONCOLOGY | Facility: HOSPITAL | Age: 73
Setting detail: RADIATION/ONCOLOGY SERIES
End: 2023-09-06
Payer: MEDICARE

## 2023-09-06 ENCOUNTER — OFFICE VISIT (OUTPATIENT)
Dept: RADIATION ONCOLOGY | Facility: HOSPITAL | Age: 73
End: 2023-09-06
Payer: MEDICARE

## 2023-09-06 VITALS
BODY MASS INDEX: 18.92 KG/M2 | DIASTOLIC BLOOD PRESSURE: 69 MMHG | HEART RATE: 83 BPM | WEIGHT: 120.8 LBS | SYSTOLIC BLOOD PRESSURE: 109 MMHG

## 2023-09-06 DIAGNOSIS — R91.1 LUNG NODULE: Primary | ICD-10-CM

## 2023-09-06 PROCEDURE — G0463 HOSPITAL OUTPT CLINIC VISIT: HCPCS

## 2023-09-06 NOTE — PROGRESS NOTES
[facility] Radiation Oncology   Follow Up    Chief Complaint  RLL Adenocarcinoma        Diagnosis: Adenocarcinoma of the Right Lower Lobe     Overall Stage IA3     cT1c: 2.5cm on imaging  cN0: per PET CT, will need to monitor right hilar lymph node  cM0: per PET CT      Radiation Completion Date: 5/30/2023        Prescription:      Site: RLL  Laterality: Right  Total Dose: 4800cGy  Dose per Fraction: 1200cGy  Total Fractions: 4  Daily or BID: Daily  Modality: Photon  Technique: SBRT (2-5fx)  Bolus: No      Interval History:    Darling Morgan presents for regularly scheduled follow up approximately 3 months after completion of radiation therapy for Adenocarcinoma of the RLL. The patient tolerated therapy well and has done well in the interim with no new or concerning lung complaints. She has some intermittent chest wall tenderness which could be related to her recent radiation therapy, but symptoms are mild.       Imaging:      CT Chest 8/30/2023    IMPRESSION:  Interval decrease in size of right lower lobe cavitary mass as  described. Adjacent developing postradiation change.      Pathology:      No new relevant pathology    Labs:    Lab Results   Component Value Date    CREATININE 0.67 07/04/2022             Problem List:  Patient Active Problem List   Diagnosis    Benign neoplasm of cerebral meninges    Cerebral meningioma    Postmenopausal status    Atrophic vaginitis    Lung nodule    Paroxysmal atrial fibrillation with rapid ventricular response    Emphysema lung    GERD (gastroesophageal reflux disease)    History of COVID-19    Hypertension    Stroke-like symptoms    Follow-up exam    Acute posthemorrhagic anemia    Hypopotassemia    Abnormal PET scan of colon          Medications:  Current Outpatient Medications on File Prior to Visit   Medication Sig Dispense Refill    Acetaminophen-Caffeine (EXCEDRIN ASPIRIN FREE PO) Take 1 tablet by mouth As Needed.      amLODIPine (NORVASC) 2.5 MG tablet Take 1 tablet by mouth  "Daily. 90 tablet 3    atorvastatin (LIPITOR) 40 MG tablet Take 1 tablet by mouth Every Night. 90 tablet 3    metoprolol tartrate (LOPRESSOR) 50 MG tablet Take 1.5 tablets by mouth Every 12 (Twelve) Hours. 135 tablet 3    omeprazole (priLOSEC) 20 MG capsule Take 2 capsules by mouth Daily. TAKES TWO CAPSULE BY MOUTH DAILY      potassium chloride (K-DUR,KLOR-CON) 20 MEQ CR tablet Take 1 tablet by mouth Daily. 90 tablet 3    rivaroxaban (Xarelto) 20 MG tablet Take 1 tablet by mouth Daily With Dinner. Indications: Atrial Fibrillation 90 tablet 3    zolpidem (AMBIEN) 10 MG tablet Take 1 tablet by mouth Every Night.       No current facility-administered medications on file prior to visit.          Allergies:  No Known Allergies        Vital Signs:  /69   Pulse 83   Wt 54.8 kg (120 lb 12.8 oz)   BMI 18.92 kg/m²   Estimated body mass index is 18.92 kg/m² as calculated from the following:    Height as of 6/19/23: 170.2 cm (67\").    Weight as of this encounter: 54.8 kg (120 lb 12.8 oz).  Pain Score    09/06/23 1141   PainSc: 0-No pain         ECOG: Restricted in physically strenuous activity but ambulatory and able to carry out work of a light or sedentary nature, e.g., light house work, office work = 1    Physical Exam  Vitals reviewed.   Constitutional:       General: She is not in acute distress.     Appearance: Normal appearance.   HENT:      Head: Normocephalic and atraumatic.   Eyes:      Extraocular Movements: Extraocular movements intact.      Pupils: Pupils are equal, round, and reactive to light.   Pulmonary:      Effort: Pulmonary effort is normal.   Abdominal:      General: Abdomen is flat.      Palpations: Abdomen is soft.   Musculoskeletal:      Cervical back: Normal range of motion.   Skin:     General: Skin is warm and dry.   Neurological:      General: No focal deficit present.      Mental Status: She is alert and oriented to person, place, and time.   Psychiatric:         Mood and Affect: Mood " normal.         Behavior: Behavior normal.        Result Review :  The following data was reviewed by: Howie Campos MD on 09/06/2023:  Labs: Last Creatinine   Data reviewed : Radiologic studies CT Chest             Diagnoses and all orders for this visit:    1. Lung nodule (Primary)  -     CT Chest With Contrast; Future        Assessment:    Darling Morgan presents for regularly scheduled follow up approximately 3 months after completion of radiation therapy for Adenocarcinoma of the RLL. The patient tolerated therapy well and has done well in the interim with no new or concerning lung complaints. She has some intermittent chest wall tenderness which could be related to her recent radiation therapy, but symptoms are mild.     I met with the patient and discussed the results of her most recent CT Chest. Overall, these images are consistent with favorable treatment response. We will plan to see her in 3 months with CT Chest.     Plan:    -Follow up in 3 months with CT Chest  -Nurse navigator to help arrange patient visit with survivorship clinic        I spent 30 minutes caring for Darling on this date of service. This time includes time spent by me in the following activities:preparing for the visit, reviewing tests, obtaining and/or reviewing a separately obtained history, documenting information in the medical record, independently interpreting results and communicating that information with the patient/family/caregiver, and care coordination  Follow Up   No follow-ups on file.  Patient was given instructions and counseling regarding her condition or for health maintenance advice. Please see specific information pulled into the AVS if appropriate.     Howie Campos MD

## 2023-09-06 NOTE — PROGRESS NOTES
Reviewed chart    I accompanied patient and  to follow up appt with Dr. Campos today. Dr. Campos discussed her most recent CT results. All questions answered. Dr. Campos is ordering a CT with contrast in 3 mths; patient will meet with Dr. Campos following that exam.    We discussed survivorship visit; patient declined at this time.     We again discussed integrative therapies and [facility]. Patient expressed gratitude for my support and denied any additional needs at this time. I will follow up after her scans/appt in 3 months; encouraged the patient to call as needed.

## 2023-09-13 RX ORDER — METOPROLOL TARTRATE 50 MG/1
75 TABLET, FILM COATED ORAL EVERY 12 HOURS SCHEDULED
Qty: 135 TABLET | Refills: 3 | Status: SHIPPED | OUTPATIENT
Start: 2023-09-13

## 2023-12-06 ENCOUNTER — HOSPITAL ENCOUNTER (OUTPATIENT)
Dept: CT IMAGING | Facility: HOSPITAL | Age: 73
Discharge: HOME | End: 2023-12-06
Admitting: STUDENT IN AN ORGANIZED HEALTH CARE EDUCATION/TRAINING PROGRAM
Payer: MEDICARE

## 2023-12-06 DIAGNOSIS — R91.1 LUNG NODULE: ICD-10-CM

## 2023-12-06 PROCEDURE — 25510000001 IOPAMIDOL 61 % SOLUTION: Performed by: STUDENT IN AN ORGANIZED HEALTH CARE EDUCATION/TRAINING PROGRAM

## 2023-12-06 PROCEDURE — 82565 ASSAY OF CREATININE: CPT

## 2023-12-06 PROCEDURE — 71260 CT THORAX DX C+: CPT

## 2023-12-06 RX ADMIN — IOPAMIDOL 75 ML: 612 INJECTION, SOLUTION INTRAVENOUS at 11:29

## 2023-12-07 LAB — CREAT BLDA-MCNC: 1.2 MG/DL (ref 0.6–1.3)

## 2023-12-11 ENCOUNTER — TELEPHONE (OUTPATIENT)
Dept: RADIATION ONCOLOGY | Facility: HOSPITAL | Age: 73
End: 2023-12-11
Payer: MEDICARE

## 2023-12-12 ENCOUNTER — TELEPHONE (OUTPATIENT)
Dept: RADIATION ONCOLOGY | Facility: HOSPITAL | Age: 73
End: 2023-12-12
Payer: MEDICARE

## 2023-12-12 NOTE — PROGRESS NOTES
[facility], [place], operated by [facility]   Follow Up    Chief Complaint  RLL Adenocarcinoma        Diagnosis: Adenocarcinoma of the Right Lower Lobe     Overall Stage IA3     cT1c: 2.5cm on imaging  cN0: per PET CT, will need to monitor right hilar lymph node  cM0: per PET CT        Radiation Completion Date: 5/30/2023        Prescription:      Site: RLL  Laterality: Right  Total Dose: 4800cGy  Dose per Fraction: 1200cGy  Total Fractions: 4  Daily or BID: Daily  Modality: Photon  Technique: SBRT (2-5fx)  Bolus: No      Interval History:    Darling Morgan presents for regularly scheduled follow-up approximately 6 months after completion of radiation therapy for an adenocarcinoma of the right lower lobe.  The patient reports that she has mostly been doing well with no concerning complaints with regards to her breathing, with the exception of recent illness where she was diagnosed with UTI.  She is improving from those symptoms but had had some increased shortness of breath prior to diagnosis.      Imaging:      CT Chest 12/6/2023    IMPRESSION:  Further decrease in size and wall thickness of right lower lobe cavitary  lesion.      Pathology:      No new relevant pathology      Labs:    Lab Results   Component Value Date    CREATININE 1.20 12/06/2023               Problem List:  Patient Active Problem List   Diagnosis    Benign neoplasm of cerebral meninges    Cerebral meningioma    Postmenopausal status    Atrophic vaginitis    Lung nodule    Paroxysmal atrial fibrillation with rapid ventricular response    Emphysema lung    GERD (gastroesophageal reflux disease)    History of COVID-19    Hypertension    Stroke-like symptoms    Follow-up exam    Acute posthemorrhagic anemia    Hypopotassemia    Abnormal PET scan of colon          Medications:  Current Outpatient Medications on File Prior to Visit   Medication Sig Dispense Refill    Acetaminophen-Caffeine (EXCEDRIN ASPIRIN FREE PO) Take 1 tablet by mouth As Needed.      amLODIPine  "(NORVASC) 2.5 MG tablet Take 1 tablet by mouth Daily. 90 tablet 3    atorvastatin (LIPITOR) 40 MG tablet Take 1 tablet by mouth Every Night. 90 tablet 3    metoprolol tartrate (LOPRESSOR) 50 MG tablet Take 1.5 tablets by mouth Every 12 (Twelve) Hours. 135 tablet 3    omeprazole (priLOSEC) 20 MG capsule Take 2 capsules by mouth Daily. TAKES TWO CAPSULE BY MOUTH DAILY      potassium chloride (K-DUR,KLOR-CON) 20 MEQ CR tablet Take 1 tablet by mouth Daily. 90 tablet 3    rivaroxaban (Xarelto) 20 MG tablet Take 1 tablet by mouth Daily With Dinner. Indications: Atrial Fibrillation 90 tablet 3    zolpidem (AMBIEN) 10 MG tablet Take 1 tablet by mouth Every Night.       No current facility-administered medications on file prior to visit.          Allergies:  No Known Allergies        Vital Signs:  /83   Pulse 68   Wt 52.8 kg (116 lb 6.4 oz)   SpO2 99%   BMI 18.23 kg/m²   Estimated body mass index is 18.23 kg/m² as calculated from the following:    Height as of 6/19/23: 170.2 cm (67\").    Weight as of this encounter: 52.8 kg (116 lb 6.4 oz).  Pain Score    12/13/23 1211   PainSc: 0-No pain         ECOG: Restricted in physically strenuous activity but ambulatory and able to carry out work of a light or sedentary nature, e.g., light house work, office work = 1    Physical Exam  Vitals reviewed.   Constitutional:       General: She is not in acute distress.     Appearance: Normal appearance.   HENT:      Head: Normocephalic and atraumatic.   Eyes:      Extraocular Movements: Extraocular movements intact.      Pupils: Pupils are equal, round, and reactive to light.   Pulmonary:      Effort: Pulmonary effort is normal.   Abdominal:      General: Abdomen is flat.      Palpations: Abdomen is soft.   Musculoskeletal:      Cervical back: Normal range of motion.   Skin:     General: Skin is warm and dry.   Neurological:      General: No focal deficit present.      Mental Status: She is alert and oriented to person, place, and " time.   Psychiatric:         Mood and Affect: Mood normal.         Behavior: Behavior normal.          Result Review :  The following data was reviewed by: Howie Campos MD on 12/13/2023:  Labs: Last Creatinine   Data reviewed : Radiologic studies CT Chest             Diagnoses and all orders for this visit:    1. Primary adenocarcinoma of right lung (Primary)        Assessment:    Darling Morgan presents for regularly scheduled follow-up approximately 6 months after completion of radiation therapy for an adenocarcinoma of the right lower lobe.  The patient reports that she has mostly been doing well with no concerning complaints with regards to her breathing, with the exception of recent illness where she was diagnosed with UTI.  She is improving from those symptoms but had had some increased shortness of breath prior to diagnosis.    I discussed the results of her most recent CT chest in detail.  Overall, this demonstrates further decrease in size of her treated right lower lobe lesion.  The patient can follow-up with me in 3 months with repeat CT chest.    Plan:    -Follow-up in 3 months with CT chest       I spent 30 minutes caring for Darling on this date of service. This time includes time spent by me in the following activities:preparing for the visit, reviewing tests, obtaining and/or reviewing a separately obtained history, documenting information in the medical record, independently interpreting results and communicating that information with the patient/family/caregiver, and care coordination  Follow Up   No follow-ups on file.  Patient was given instructions and counseling regarding her condition or for health maintenance advice. Please see specific information pulled into the AVS if appropriate.     Howie Campos MD

## 2023-12-13 ENCOUNTER — OFFICE VISIT (OUTPATIENT)
Dept: RADIATION ONCOLOGY | Facility: HOSPITAL | Age: 73
End: 2023-12-13
Payer: MEDICARE

## 2023-12-13 VITALS
BODY MASS INDEX: 18.23 KG/M2 | HEART RATE: 68 BPM | WEIGHT: 116.4 LBS | OXYGEN SATURATION: 99 % | DIASTOLIC BLOOD PRESSURE: 83 MMHG | SYSTOLIC BLOOD PRESSURE: 130 MMHG

## 2023-12-13 DIAGNOSIS — C34.91 PRIMARY ADENOCARCINOMA OF RIGHT LUNG: Primary | ICD-10-CM

## 2023-12-13 PROCEDURE — G0463 HOSPITAL OUTPT CLINIC VISIT: HCPCS | Performed by: STUDENT IN AN ORGANIZED HEALTH CARE EDUCATION/TRAINING PROGRAM

## 2023-12-13 PROCEDURE — G0463 HOSPITAL OUTPT CLINIC VISIT: HCPCS

## 2023-12-14 ENCOUNTER — PATIENT OUTREACH (OUTPATIENT)
Dept: OTHER | Facility: HOSPITAL | Age: 73
End: 2023-12-14
Payer: MEDICARE

## 2023-12-14 NOTE — PROGRESS NOTES
Reviewed chart; CT 12/6 demonstrates further decrease in size of her treated right lower lobe lesion.     Called patient. We discussed her appt with Dr. Campos yesterday. Her CT looked good.  She will have another scan in 3 months with f/u after Dr. Campos following the scan.    The patient had a recent UTI; she reports ongoing diarrhea despite completing her antibiotics last Wednesday. Encouraged her to contact her PCP about this. Patient verbalized understanding.    We again discussed integrative therapies and [facility]. Patient expressed gratitude for my support and denied any additional needs at this time. Since she is stable 6 months after treatment, I will close her case to navigation although reminded her that she can access the services in the [facility] even with her navigation case being closed. Encouraged patient to call in the future if the need arises. Patient verbalized understanding.

## 2023-12-15 DIAGNOSIS — C34.91 PRIMARY ADENOCARCINOMA OF RIGHT LUNG: Primary | ICD-10-CM

## 2024-02-21 ENCOUNTER — PREP FOR SURGERY (OUTPATIENT)
Dept: OTHER | Facility: HOSPITAL | Age: 74
End: 2024-02-21
Payer: MEDICARE

## 2024-02-21 ENCOUNTER — TELEPHONE (OUTPATIENT)
Dept: GASTROENTEROLOGY | Facility: CLINIC | Age: 74
End: 2024-02-21
Payer: MEDICARE

## 2024-02-21 DIAGNOSIS — Z12.11 ENCOUNTER FOR COLONOSCOPY DUE TO HISTORY OF ADENOMATOUS COLONIC POLYPS: Primary | ICD-10-CM

## 2024-02-21 DIAGNOSIS — Z86.010 ENCOUNTER FOR COLONOSCOPY DUE TO HISTORY OF ADENOMATOUS COLONIC POLYPS: Primary | ICD-10-CM

## 2024-02-21 NOTE — TELEPHONE ENCOUNTER
LAST C/S  5/1/23   IN EPIC      PERSONAL HX OF POLYPS     FAMILY HX OF POLYPS     FAMILY HX OF COLON CA    NO ASA OR BLOOD THINNERS        LIST OF  MEDICATIONS    METOPROLOL  AMLODIPINE   XARELTO   ATORVASTATIN  POTASSIUM  ACIDOPHILUS PROBIOTIC  OMEPRAZOLE  EXCEDRINE          OA QUESTIONNAIRE SCANNED IN MEDIA

## 2024-02-22 ENCOUNTER — TELEPHONE (OUTPATIENT)
Dept: GASTROENTEROLOGY | Facility: CLINIC | Age: 74
End: 2024-02-22
Payer: MEDICARE

## 2024-02-22 PROBLEM — Z12.11 ENCOUNTER FOR COLONOSCOPY DUE TO HISTORY OF ADENOMATOUS COLONIC POLYPS: Status: ACTIVE | Noted: 2024-02-21

## 2024-02-22 PROBLEM — Z86.0101 ENCOUNTER FOR COLONOSCOPY DUE TO HISTORY OF ADENOMATOUS COLONIC POLYPS: Status: ACTIVE | Noted: 2024-02-21

## 2024-02-22 PROBLEM — Z86.010 ENCOUNTER FOR COLONOSCOPY DUE TO HISTORY OF ADENOMATOUS COLONIC POLYPS: Status: ACTIVE | Noted: 2024-02-21

## 2024-03-05 RX ORDER — METOPROLOL TARTRATE 50 MG/1
75 TABLET, FILM COATED ORAL EVERY 12 HOURS
Qty: 135 TABLET | Refills: 0 | Status: SHIPPED | OUTPATIENT
Start: 2024-03-05

## 2024-03-15 ENCOUNTER — HOSPITAL ENCOUNTER (OUTPATIENT)
Dept: CT IMAGING | Facility: HOSPITAL | Age: 74
Discharge: HOME | End: 2024-03-15
Payer: MEDICARE

## 2024-03-15 DIAGNOSIS — C34.91 PRIMARY ADENOCARCINOMA OF RIGHT LUNG: ICD-10-CM

## 2024-03-15 LAB — CREAT BLDA-MCNC: 1.1 MG/DL (ref 0.6–1.3)

## 2024-03-15 PROCEDURE — 71260 CT THORAX DX C+: CPT

## 2024-03-15 PROCEDURE — 82565 ASSAY OF CREATININE: CPT

## 2024-03-15 PROCEDURE — 25510000001 IOPAMIDOL 61 % SOLUTION: Performed by: STUDENT IN AN ORGANIZED HEALTH CARE EDUCATION/TRAINING PROGRAM

## 2024-03-15 RX ADMIN — IOPAMIDOL 75 ML: 612 INJECTION, SOLUTION INTRAVENOUS at 10:58

## 2024-03-21 ENCOUNTER — TELEPHONE (OUTPATIENT)
Dept: RADIATION ONCOLOGY | Facility: HOSPITAL | Age: 74
End: 2024-03-21
Payer: MEDICARE

## 2024-03-22 ENCOUNTER — OFFICE VISIT (OUTPATIENT)
Dept: RADIATION ONCOLOGY | Facility: HOSPITAL | Age: 74
End: 2024-03-22
Payer: MEDICARE

## 2024-03-22 VITALS
DIASTOLIC BLOOD PRESSURE: 78 MMHG | BODY MASS INDEX: 18.36 KG/M2 | SYSTOLIC BLOOD PRESSURE: 137 MMHG | OXYGEN SATURATION: 98 % | HEART RATE: 73 BPM | WEIGHT: 117.2 LBS

## 2024-03-22 DIAGNOSIS — C34.91 PRIMARY ADENOCARCINOMA OF RIGHT LUNG: Primary | ICD-10-CM

## 2024-03-22 NOTE — PROGRESS NOTES
[facility], [place] [facility]   Follow Up    Chief Complaint  RLL Adenocarcinoma        Diagnosis: Adenocarcinoma of the Right Lower Lobe     Overall Stage IA3     cT1c: 2.5cm on imaging  cN0: per PET CT, will need to monitor right hilar lymph node  cM0: per PET CT        Radiation Completion Date: 5/30/2023        Prescription:      Site: RLL  Laterality: Right  Total Dose: 4800cGy  Dose per Fraction: 1200cGy  Total Fractions: 4  Daily or BID: Daily  Modality: Photon  Technique: SBRT (2-5fx)  Bolus: No        Interval History:     Darling Morgan presents for regularly scheduled follow-up approximately 9 months after completion of radiation therapy for an adenocarcinoma of the right lower lobe.  The patient had a bout of pneumonia in the past few months but has recovered well.  Otherwise, she has been stable with regards to her breathing.  She reports that she is back to her baseline.  No new or concerning complaints      Imaging:      CT Chest 3/15/2024    IMPRESSION:  Decrease in size of right lower lobe cavitary lesion. New adjacent  consolidation and groundglass opacity most likely reflecting  postradiation change. Additionally there is some increased wall  thickening of the cavitary lesion which is of uncertain etiology but  could possibly reflect postradiation change. Continued follow-up is  recommended      Pathology:      No new relevant pathology      Labs:    Lab Results   Component Value Date    CREATININE 1.10 03/15/2024             Problem List:  Patient Active Problem List   Diagnosis    Benign neoplasm of cerebral meninges    Cerebral meningioma    Postmenopausal status    Atrophic vaginitis    Lung nodule    Paroxysmal atrial fibrillation with rapid ventricular response    Emphysema lung    GERD (gastroesophageal reflux disease)    History of COVID-19    Hypertension    Stroke-like symptoms    Follow-up exam    Acute posthemorrhagic anemia    Hypopotassemia    Abnormal PET scan of colon    Encounter for  "colonoscopy due to history of adenomatous colonic polyps          Medications:  Current Outpatient Medications on File Prior to Visit   Medication Sig Dispense Refill    Acetaminophen-Caffeine (EXCEDRIN ASPIRIN FREE PO) Take 1 tablet by mouth As Needed.      amLODIPine (NORVASC) 2.5 MG tablet Take 1 tablet by mouth Daily. 90 tablet 3    atorvastatin (LIPITOR) 40 MG tablet Take 1 tablet by mouth Every Night. 90 tablet 3    metoprolol tartrate (LOPRESSOR) 50 MG tablet TAKE 1 AND 1/2 TABLETS BY MOUTH EVERY 12 HOURS 135 tablet 0    omeprazole (priLOSEC) 20 MG capsule Take 2 capsules by mouth Daily. TAKES TWO CAPSULE BY MOUTH DAILY      potassium chloride (K-DUR,KLOR-CON) 20 MEQ CR tablet Take 1 tablet by mouth Daily. 90 tablet 3    rivaroxaban (Xarelto) 20 MG tablet Take 1 tablet by mouth Daily With Dinner. Indications: Atrial Fibrillation 90 tablet 3    zolpidem (AMBIEN) 10 MG tablet Take 1 tablet by mouth Every Night.       No current facility-administered medications on file prior to visit.          Allergies:  No Known Allergies        Vital Signs:  /78   Pulse 73   Wt 53.2 kg (117 lb 3.2 oz)   SpO2 98%   BMI 18.36 kg/m²   Estimated body mass index is 18.36 kg/m² as calculated from the following:    Height as of 6/19/23: 170.2 cm (67\").    Weight as of this encounter: 53.2 kg (117 lb 3.2 oz).  Pain Score    03/22/24 1313   PainSc: 0-No pain         ECOG: Fully active, able to carry on all pre-disease performance without restriction = 0    Physical Exam  Vitals reviewed.   Constitutional:       General: She is not in acute distress.     Appearance: Normal appearance.   HENT:      Head: Normocephalic and atraumatic.   Eyes:      Extraocular Movements: Extraocular movements intact.      Pupils: Pupils are equal, round, and reactive to light.   Pulmonary:      Effort: Pulmonary effort is normal.   Abdominal:      General: Abdomen is flat.      Palpations: Abdomen is soft.   Musculoskeletal:      Cervical back: " Normal range of motion.   Skin:     General: Skin is warm and dry.   Neurological:      General: No focal deficit present.      Mental Status: She is alert and oriented to person, place, and time.   Psychiatric:         Mood and Affect: Mood normal.         Behavior: Behavior normal.          Result Review :  The following data was reviewed by: Howie Campos MD on 03/22/2024:  Labs: Last Creatinine   Data reviewed : Radiologic studies CT Chest             Diagnoses and all orders for this visit:    1. Primary adenocarcinoma of right lung (Primary)        Assessment:    Darling Morgan presents for regularly scheduled follow-up approximately 9 months after completion of radiation therapy for an adenocarcinoma of the right lower lobe.  The patient had a bout of pneumonia in the past few months but has recovered well.  Otherwise, she has been stable with regards to her breathing.  She reports that she is back to her baseline.  No new or concerning complaints.    I met with the patient reviewed the results of her most recent CT chest in detail.  Overall, this is consistent with postradiation change.  There is some increased consolidation peripherally adjacent to the area of previous treatment.  We will follow this with CT chest in 3 months.      Plan:    -Follow-up in 3 months with CT chest       I spent 30 minutes caring for Darling on this date of service. This time includes time spent by me in the following activities:preparing for the visit, reviewing tests, obtaining and/or reviewing a separately obtained history, documenting information in the medical record, independently interpreting results and communicating that information with the patient/family/caregiver, and care coordination  Follow Up   No follow-ups on file.  Patient was given instructions and counseling regarding her condition or for health maintenance advice. Please see specific information pulled into the AVS if appropriate.     Howie Campos MD

## 2024-03-25 DIAGNOSIS — C34.91 PRIMARY ADENOCARCINOMA OF RIGHT LUNG: Primary | ICD-10-CM

## 2024-05-20 ENCOUNTER — ANESTHESIA EVENT (OUTPATIENT)
Dept: GASTROENTEROLOGY | Facility: HOSPITAL | Age: 74
End: 2024-05-20
Payer: MEDICARE

## 2024-05-20 ENCOUNTER — ANESTHESIA (OUTPATIENT)
Dept: GASTROENTEROLOGY | Facility: HOSPITAL | Age: 74
End: 2024-05-20
Payer: MEDICARE

## 2024-05-20 ENCOUNTER — HOSPITAL ENCOUNTER (OUTPATIENT)
Facility: HOSPITAL | Age: 74
Setting detail: HOSPITAL OUTPATIENT SURGERY
Discharge: HOME | End: 2024-05-20
Attending: INTERNAL MEDICINE | Admitting: INTERNAL MEDICINE
Payer: MEDICARE

## 2024-05-20 VITALS
DIASTOLIC BLOOD PRESSURE: 83 MMHG | BODY MASS INDEX: 17.78 KG/M2 | SYSTOLIC BLOOD PRESSURE: 103 MMHG | WEIGHT: 113.3 LBS | HEART RATE: 90 BPM | HEIGHT: 67 IN | RESPIRATION RATE: 18 BRPM | OXYGEN SATURATION: 97 %

## 2024-05-20 DIAGNOSIS — Z12.11 ENCOUNTER FOR COLONOSCOPY DUE TO HISTORY OF ADENOMATOUS COLONIC POLYPS: ICD-10-CM

## 2024-05-20 DIAGNOSIS — Z86.010 ENCOUNTER FOR COLONOSCOPY DUE TO HISTORY OF ADENOMATOUS COLONIC POLYPS: ICD-10-CM

## 2024-05-20 PROCEDURE — 25010000002 PROPOFOL 1000 MG/100ML EMULSION: Performed by: NURSE ANESTHETIST, CERTIFIED REGISTERED

## 2024-05-20 PROCEDURE — 25810000003 LACTATED RINGERS PER 1000 ML: Performed by: INTERNAL MEDICINE

## 2024-05-20 PROCEDURE — S0260 H&P FOR SURGERY: HCPCS | Performed by: INTERNAL MEDICINE

## 2024-05-20 PROCEDURE — 88305 TISSUE EXAM BY PATHOLOGIST: CPT | Performed by: INTERNAL MEDICINE

## 2024-05-20 RX ORDER — SODIUM CHLORIDE 0.9 % (FLUSH) 0.9 %
10 SYRINGE (ML) INJECTION AS NEEDED
Status: DISCONTINUED | OUTPATIENT
Start: 2024-05-20 | End: 2024-05-20 | Stop reason: HOSPADM

## 2024-05-20 RX ORDER — PROPOFOL 10 MG/ML
INJECTION, EMULSION INTRAVENOUS AS NEEDED
Status: DISCONTINUED | OUTPATIENT
Start: 2024-05-20 | End: 2024-05-20 | Stop reason: SURG

## 2024-05-20 RX ORDER — LIDOCAINE HYDROCHLORIDE 20 MG/ML
INJECTION, SOLUTION INFILTRATION; PERINEURAL AS NEEDED
Status: DISCONTINUED | OUTPATIENT
Start: 2024-05-20 | End: 2024-05-20 | Stop reason: SURG

## 2024-05-20 RX ORDER — SODIUM CHLORIDE, SODIUM LACTATE, POTASSIUM CHLORIDE, CALCIUM CHLORIDE 600; 310; 30; 20 MG/100ML; MG/100ML; MG/100ML; MG/100ML
1000 INJECTION, SOLUTION INTRAVENOUS CONTINUOUS
Status: DISCONTINUED | OUTPATIENT
Start: 2024-05-20 | End: 2024-05-20 | Stop reason: HOSPADM

## 2024-05-20 RX ADMIN — PROPOFOL INJECTABLE EMULSION 150 MCG/KG/MIN: 10 INJECTION, EMULSION INTRAVENOUS at 14:27

## 2024-05-20 RX ADMIN — PROPOFOL INJECTABLE EMULSION 70 MG: 10 INJECTION, EMULSION INTRAVENOUS at 14:26

## 2024-05-20 RX ADMIN — LIDOCAINE HYDROCHLORIDE 60 MG: 20 INJECTION, SOLUTION INFILTRATION; PERINEURAL at 14:26

## 2024-05-20 RX ADMIN — SODIUM CHLORIDE, POTASSIUM CHLORIDE, SODIUM LACTATE AND CALCIUM CHLORIDE 1000 ML: 600; 310; 30; 20 INJECTION, SOLUTION INTRAVENOUS at 14:07

## 2024-05-20 NOTE — ANESTHESIA PREPROCEDURE EVALUATION
Anesthesia Evaluation     Patient summary reviewed and Nursing notes reviewed                Airway   Mallampati: II  Dental    (+) upper dentures    Pulmonary    (+) a smoker Current, lung cancer, COPD,  Cardiovascular     ECG reviewed  PT is on anticoagulation therapy  Rhythm: irregular  Rate: normal    (+) hypertension, dysrhythmias Atrial Fib, hyperlipidemia      Neuro/Psych  (+) numbness, psychiatric history Anxiety, Depression and ADD  GI/Hepatic/Renal/Endo    (+) GERD    Musculoskeletal     Abdominal    Substance History - negative use     OB/GYN negative ob/gyn ROS         Other   arthritis,   history of cancer                Anesthesia Plan    ASA 4     MAC     (Chronic AFib  Upper dentures full)  intravenous induction     Anesthetic plan, risks, benefits, and alternatives have been provided, discussed and informed consent has been obtained with: patient.    CODE STATUS:

## 2024-05-20 NOTE — ANESTHESIA POSTPROCEDURE EVALUATION
Detail Level: Generalized Patient: Darling Morgan    Procedure Summary       Date: 05/20/24 Room / Location: [facility] ENDOSCOPY 4 / [facility]    Anesthesia Start: 1421 Anesthesia Stop: 1453    Procedure: COLONOSCOPY cecum and TI with hot snare/cold biopsy/cold snare polypectomies Diagnosis:       Encounter for colonoscopy due to history of adenomatous colonic polyps      (Encounter for colonoscopy due to history of adenomatous colonic polyps [Z12.11, Z86.010])    Surgeons: Geena Miller MD Provider: Cain Malone MD    Anesthesia Type: MAC ASA Status: 4            Anesthesia Type: MAC    Vitals  Vitals Value Taken Time   /86 05/20/24 1451   Temp     Pulse 96 05/20/24 1455   Resp 20 05/20/24 1451   SpO2 95 % 05/20/24 1455   Vitals shown include unfiled device data.        Post Anesthesia Care and Evaluation    Patient location during evaluation: PACU  Patient participation: complete - patient participated  Level of consciousness: awake and alert  Pain management: adequate    Airway patency: patent  Anesthetic complications: No anesthetic complications    Cardiovascular status: acceptable  Respiratory status: acceptable  Hydration status: acceptable    Comments: --------------------            05/20/24               1451     --------------------   BP:       104/86     Pulse:      92       Resp:       20       SpO2:      94%      --------------------     Detail Level: Simple

## 2024-05-20 NOTE — DISCHARGE INSTRUCTIONS
For the next 24 hours patient needs to be with a responsible adult.    For 24 hours DO NOT drive, operate machinery, appliances, drink alcohol, make important decisions or sign legal documents.    Start with a light or bland diet if you are feeling sick to your stomach otherwise advance to regular diet as tolerated.    Follow recommendations on procedure report if provided by your doctor.    Call Dr Miller for problems .    Problems may include but not limited to: large amounts of bleeding, trouble breathing, repeated vomiting, severe unrelieved pain, fever or chills.

## 2024-05-20 NOTE — H&P
[facility]  Pre Procedure History & Physical    Chief Complaint:   H/o adenomatous polyps    Subjective     HPI:   72 yo here today for colonoscopy.  Pt reports + FH CRC (niece).  Patient denies GI symptoms currently.  Last exam 5/23-> 10 polyps removed    Past Medical History:   Past Medical History:   Diagnosis Date    AC joint arthropathy     ADD (attention deficit disorder)     Anxiety     Arthritis     Atrial fibrillation     DR RENDON    Fuentes esophagus     Bell's palsy     Bigeminy     Cancer of right lung     DR. LAZARO KIDD, NEW DIAGNOSIS    Chronic pain     BILATERAL LEGS    Colon polyps     Depression     Dysphagia     FEELS LIKE SOMETHING STUCK IN HER THROAT AT TIMES    Dysrhythmia 06/2022    Afib    Emphysema lung     GERD (gastroesophageal reflux disease)     Hemorrhoid     History of COVID-19     6/8/2022 IN EPIC    History of fusion of cervical spine     History of stomach ulcers     History of transfusion     Hx of radiation therapy 06/2023    Hyperlipidemia     Hypertension     Insomnia     Lung nodule     RIGHT LOBE    Meningioma     On anticoagulant therapy     Postmenopausal status 08/29/2013    Staphylococcal infection     AFTER NECK SURGERY IN 1980'S       Past Surgical History:  Past Surgical History:   Procedure Laterality Date    ANTERIOR CERVICAL DISCECTOMY W/ FUSION      BACK SURGERY      LUMBAR LAMINECTOMY    CARDIAC CATHETERIZATION N/A 05/26/2017    Procedure: Coronary angiography;  Surgeon: William Raphael MD;  Location:  [facility];  Service:     CARDIAC CATHETERIZATION N/A 05/26/2017    Procedure: Left heart cath;  Surgeon: William Raphael MD;  Location:  [facility];  Service:     CARDIAC CATHETERIZATION N/A 05/26/2017    Procedure: Left ventriculography;  Surgeon: William Raphael MD;  Location:  [facility];  Service:     COLONOSCOPY  2008    COLONOSCOPY N/A 5/1/2023    Procedure: COLONOSCOPY into cecum and TI with  hot and cold snare polypectomies;  Surgeon: Geena Miller MD;  Location: [facility] ENDOSCOPY;  Service: Gastroenterology;  Laterality: N/A;  pre- abnormal PET scan  post- diverticulosis, polyps, hemorrhoids    D & C AND LAPAROSCOPY      WITH BLOOD TRANSFUSION.    HYSTERECTOMY      INCISION AND DRAINAGE ABSCESS POSTERIOR CERVICALSPINE      FROM INFECTION AFTER CERVICAL SPINE SURGERY IN     NECK SURGERY      for herniated disk and got staph infection.    THORACOSCOPY Right 2022    Procedure: BRONCHOSCOPY, RIGHT VIDEO ASSISTED THORACOSCOPY WITH DAVINCI ROBOT WITH RIGHT LOWER LOBE WEDGE RESECTION x2 WITH INTERCOSTAL NERVE BLOCKS AND MEDIASTINAL LYMPHADENECTOMY;  Surgeon: Bharathi Quinones III, MD;  Location: [facility] MAIN OR;  Service: Robotics - DaVinci;  Laterality: Right;    VEIN LIGATION AND STRIPPING BILATERAL Right        Family History:  Family History   Problem Relation Age of Onset    Ovarian cancer Mother         with mets    Brain cancer Father     Thyroid disease Daughter         hashimototos    Coronary artery disease Maternal Grandfather          MI    Other Other         Brain tumor    Cancer Other     Thyroid disease Other     Malig Hyperthermia Neg Hx        Social History:   reports that she has been smoking cigarettes. She has never used smokeless tobacco. She reports current alcohol use. She reports that she does not use drugs.    Medications:   Medications Prior to Admission   Medication Sig Dispense Refill Last Dose    Acetaminophen-Caffeine (EXCEDRIN ASPIRIN FREE PO) Take 1 tablet by mouth As Needed.   Past Week    amLODIPine (NORVASC) 2.5 MG tablet Take 1 tablet by mouth Daily. 90 tablet 3 2024    atorvastatin (LIPITOR) 40 MG tablet Take 1 tablet by mouth Every Night. 90 tablet 3 2024    metoprolol tartrate (LOPRESSOR) 50 MG tablet TAKE 1 AND 1/2 TABLETS BY MOUTH EVERY 12 HOURS 135 tablet 0 2024    omeprazole (priLOSEC) 20 MG capsule Take 2 capsules by mouth  "Daily. TAKES TWO CAPSULE BY MOUTH DAILY   5/19/2024    potassium chloride (K-DUR,KLOR-CON) 20 MEQ CR tablet Take 1 tablet by mouth Daily. 90 tablet 3 Past Month    rivaroxaban (Xarelto) 20 MG tablet Take 1 tablet by mouth Daily With Dinner. Indications: Atrial Fibrillation 90 tablet 3 5/17/2024    zolpidem (AMBIEN) 10 MG tablet Take 1 tablet by mouth Every Night.   5/19/2024       Allergies:  Patient has no known allergies.    ROS:    Pertinent items are noted in HPI     Objective     Blood pressure 113/75, pulse 106, resp. rate 20, height 170.2 cm (67\"), weight 51.4 kg (113 lb 4.8 oz), SpO2 99%.    Physical Exam   Constitutional: Pt is oriented to person, place, and time and well-developed, well-nourished, and in no distress.   Mouth/Throat: Oropharynx is clear and moist.   Neck: Normal range of motion.   Cardiovascular: Normal rate, regular rhythm    Pulmonary/Chest: Effort normal    Abdominal: Soft. Nontender  Skin: Skin is warm and dry.   Psychiatric: Mood, memory, affect and judgment normal.     Assessment & Plan     Diagnosis:  H/o adenomatous polyps    Anticipated Surgical Procedure:  colonoscopy    The risks, benefits, and alternatives of this procedure have been discussed with the patient or the responsible party- the patient understands and agrees to proceed.                                                              "

## 2024-05-21 LAB
LAB AP CASE REPORT: NORMAL
PATH REPORT.FINAL DX SPEC: NORMAL
PATH REPORT.GROSS SPEC: NORMAL

## 2024-05-23 NOTE — PROGRESS NOTES
The polyp(s) showed hyperplastic change, which is non-cancerous and not pre-cancerous. Follow-up colonoscopy in 3 years is advised. (Please note that her colonoscopy report erroneously notes 3 month follow up)

## 2024-05-24 ENCOUNTER — TELEPHONE (OUTPATIENT)
Dept: GASTROENTEROLOGY | Facility: CLINIC | Age: 74
End: 2024-05-24
Payer: MEDICARE

## 2024-06-07 ENCOUNTER — HOSPITAL ENCOUNTER (OUTPATIENT)
Dept: CT IMAGING | Facility: HOSPITAL | Age: 74
Discharge: HOME | End: 2024-06-07
Payer: MEDICARE

## 2024-06-07 DIAGNOSIS — C34.91 PRIMARY ADENOCARCINOMA OF RIGHT LUNG: ICD-10-CM

## 2024-06-07 PROCEDURE — 71250 CT THORAX DX C-: CPT

## 2024-06-13 ENCOUNTER — TELEPHONE (OUTPATIENT)
Dept: RADIATION ONCOLOGY | Facility: HOSPITAL | Age: 74
End: 2024-06-13
Payer: MEDICARE

## 2024-06-14 ENCOUNTER — OFFICE VISIT (OUTPATIENT)
Dept: RADIATION ONCOLOGY | Facility: HOSPITAL | Age: 74
End: 2024-06-14
Payer: MEDICARE

## 2024-06-14 VITALS
SYSTOLIC BLOOD PRESSURE: 115 MMHG | WEIGHT: 114 LBS | HEART RATE: 69 BPM | OXYGEN SATURATION: 100 % | DIASTOLIC BLOOD PRESSURE: 69 MMHG | BODY MASS INDEX: 17.85 KG/M2

## 2024-06-14 DIAGNOSIS — C34.91 PRIMARY ADENOCARCINOMA OF RIGHT LUNG: Primary | ICD-10-CM

## 2024-06-14 PROCEDURE — G0463 HOSPITAL OUTPT CLINIC VISIT: HCPCS | Performed by: STUDENT IN AN ORGANIZED HEALTH CARE EDUCATION/TRAINING PROGRAM

## 2024-06-14 NOTE — PROGRESS NOTES
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  The patient tolerated treatment well and has done well in the interim.  She has had over the past few weeks of increased cough productive of some clear sputum.  She denies any signs or symptoms of systemic infection, no chills, fever.  She tried Mucinex but this gave her diarrhea.  She does not feel that her symptoms are worsening.      Imaging:      CT Chest 6/7/2024    IMPRESSION:  Stable size and appearance of treated cavitary lesion in the right lower  lobe with adjacent postradiation change      Pathology:      No new relevant pathology      Labs:    Lab Results   Component Value Date    CREATININE 1.10 03/15/2024             Problem List:  Patient Active Problem List   Diagnosis    Benign neoplasm of cerebral meninges    Cerebral meningioma    Postmenopausal status    Atrophic vaginitis    Lung nodule    Paroxysmal atrial fibrillation with rapid ventricular response    Emphysema lung    GERD (gastroesophageal reflux disease)    History of COVID-19    Hypertension    Stroke-like symptoms    Follow-up exam    Acute posthemorrhagic anemia    Hypopotassemia    Abnormal PET scan of colon    Encounter for colonoscopy due to history of adenomatous colonic polyps          Medications:  Current Outpatient Medications on File Prior to  "Visit   Medication Sig Dispense Refill    Acetaminophen-Caffeine (EXCEDRIN ASPIRIN FREE PO) Take 1 tablet by mouth As Needed.      amLODIPine (NORVASC) 2.5 MG tablet Take 1 tablet by mouth Daily. 90 tablet 3    atorvastatin (LIPITOR) 40 MG tablet Take 1 tablet by mouth Every Night. 90 tablet 3    metoprolol tartrate (LOPRESSOR) 50 MG tablet TAKE 1 AND 1/2 TABLETS BY MOUTH EVERY 12 HOURS 135 tablet 0    omeprazole (priLOSEC) 20 MG capsule Take 2 capsules by mouth Daily. TAKES TWO CAPSULE BY MOUTH DAILY      potassium chloride (K-DUR,KLOR-CON) 20 MEQ CR tablet Take 1 tablet by mouth Daily. 90 tablet 3    rivaroxaban (Xarelto) 20 MG tablet Take 1 tablet by mouth Daily With Dinner. Indications: Atrial Fibrillation 90 tablet 3    zolpidem (AMBIEN) 10 MG tablet Take 1 tablet by mouth Every Night.       No current facility-administered medications on file prior to visit.          Allergies:  No Known Allergies        Vital Signs:  /69   Pulse 69   Wt 51.7 kg (114 lb)   SpO2 100%   BMI 17.85 kg/m²   Estimated body mass index is 17.85 kg/m² as calculated from the following:    Height as of 5/20/24: 170.2 cm (67\").    Weight as of this encounter: 51.7 kg (114 lb).  Pain Score    06/14/24 1321   PainSc:   4   PainLoc: Rib Cage  Comment: right         ECOG: Restricted in physically strenuous activity but ambulatory and able to carry out work of a light or sedentary nature, e.g., light house work, office work = 1    Physical Exam  Vitals reviewed.   Constitutional:       General: She is not in acute distress.     Appearance: Normal appearance.   HENT:      Head: Normocephalic and atraumatic.   Eyes:      Extraocular Movements: Extraocular movements intact.      Pupils: Pupils are equal, round, and reactive to light.   Pulmonary:      Effort: Pulmonary effort is normal.   Abdominal:      General: Abdomen is flat.      Palpations: Abdomen is soft.   Musculoskeletal:      Cervical back: Normal range of motion.   Skin:     " General: Skin is warm and dry.   Neurological:      General: No focal deficit present.      Mental Status: She is alert and oriented to person, place, and time.   Psychiatric:         Mood and Affect: Mood normal.         Behavior: Behavior normal.            Result Review :  The following data was reviewed by: Howie Campos MD on 06/14/2024:  Labs: Last Creatinine   Data reviewed : Radiologic studies CT Chest             Diagnoses and all orders for this visit:    1. Primary adenocarcinoma of right lung (Primary)        Assessment:    Darling Morgan presents for regularly scheduled follow-up approximately 1 year after completion of radiation therapy for an adenocarcinoma of the right lower lobe.  The patient tolerated treatment well and has done well in the interim.  She has had over the past few weeks of increased cough productive of some clear sputum.  She denies any signs or symptoms of systemic infection, no chills, fever.  She tried Mucinex but this gave her diarrhea.  She does not feel that her symptoms are worsening.    I met with the patient and reviewed the results of her most recent CT chest in detail.  Overall, this is consistent with stability in her previously treated lesion.  On previous examinations the lesion had shrunk significantly.  There were no other new or concerning findings.  There is no evidence of pneumonia.  She can follow-up with me in 6 months with repeat CT chest.    With regards to her cough productive of clear sputum, hesitant to prescribe antibiotics at this point.  The patient has no worsening symptoms or evidence of more significant systemic infection.  She has home nebulizer available.  Recommended letting her PCP know or being evaluated at urgent care if her symptoms continue to persist or worsen.  Alternatively, she has my contact information and can reach out.  I also recommended that she do a COVID test.      Plan:    -Follow-up in 6 months with CT chest       I spent 30  minutes caring for Darling on this date of service. This time includes time spent by me in the following activities:preparing for the visit, reviewing tests, obtaining and/or reviewing a separately obtained history, documenting information in the medical record, independently interpreting results and communicating that information with the patient/family/caregiver, and care coordination  Follow Up   No follow-ups on file.  Patient was given instructions and counseling regarding her condition or for health maintenance advice. Please see specific information pulled into the AVS if appropriate.     Howie Campos MD

## 2024-06-24 RX ORDER — AMLODIPINE BESYLATE 2.5 MG/1
2.5 TABLET ORAL
Qty: 10 TABLET | Refills: 0 | Status: SHIPPED | OUTPATIENT
Start: 2024-06-24

## 2024-06-24 NOTE — TELEPHONE ENCOUNTER
06/24/24  14:35 EDT    Courtesy refill given.  Patient must schedule a follow-up to avoid interruption in refills.      SUKHJINDER Zhou  [facility]

## 2024-07-02 ENCOUNTER — TRANSCRIBE ORDERS (OUTPATIENT)
Dept: ADMINISTRATIVE | Facility: HOSPITAL | Age: 74
End: 2024-07-02
Payer: MEDICARE

## 2024-07-02 DIAGNOSIS — Z12.31 VISIT FOR SCREENING MAMMOGRAM: Primary | ICD-10-CM

## 2024-07-08 RX ORDER — AMLODIPINE BESYLATE 2.5 MG/1
2.5 TABLET ORAL
Qty: 30 TABLET | Refills: 0 | Status: SHIPPED | OUTPATIENT
Start: 2024-07-08

## 2024-07-15 RX ORDER — METOPROLOL TARTRATE 50 MG/1
75 TABLET, FILM COATED ORAL EVERY 12 HOURS
Qty: 135 TABLET | Refills: 0 | Status: SHIPPED | OUTPATIENT
Start: 2024-07-15 | End: 2024-07-15

## 2024-07-15 RX ORDER — METOPROLOL TARTRATE 50 MG/1
75 TABLET, FILM COATED ORAL EVERY 12 HOURS
Qty: 270 TABLET | Refills: 3 | Status: SHIPPED | OUTPATIENT
Start: 2024-07-15

## 2024-07-29 ENCOUNTER — OFFICE VISIT (OUTPATIENT)
Dept: CARDIOLOGY | Facility: CLINIC | Age: 74
End: 2024-07-29
Payer: MEDICARE

## 2024-07-29 VITALS
DIASTOLIC BLOOD PRESSURE: 85 MMHG | SYSTOLIC BLOOD PRESSURE: 130 MMHG | WEIGHT: 112 LBS | HEIGHT: 67 IN | HEART RATE: 58 BPM | BODY MASS INDEX: 17.58 KG/M2 | OXYGEN SATURATION: 95 %

## 2024-07-29 DIAGNOSIS — I10 PRIMARY HYPERTENSION: ICD-10-CM

## 2024-07-29 DIAGNOSIS — I48.0 PAROXYSMAL ATRIAL FIBRILLATION WITH RAPID VENTRICULAR RESPONSE: Primary | ICD-10-CM

## 2024-07-29 DIAGNOSIS — R42 DIZZINESS: ICD-10-CM

## 2024-07-29 PROCEDURE — 3079F DIAST BP 80-89 MM HG: CPT | Performed by: NURSE PRACTITIONER

## 2024-07-29 PROCEDURE — 99214 OFFICE O/P EST MOD 30 MIN: CPT | Performed by: NURSE PRACTITIONER

## 2024-07-29 PROCEDURE — 93000 ELECTROCARDIOGRAM COMPLETE: CPT | Performed by: NURSE PRACTITIONER

## 2024-07-29 PROCEDURE — 3075F SYST BP GE 130 - 139MM HG: CPT | Performed by: NURSE PRACTITIONER

## 2024-07-29 RX ORDER — ATORVASTATIN CALCIUM 40 MG/1
40 TABLET, FILM COATED ORAL NIGHTLY
Qty: 90 TABLET | Refills: 3 | Status: SHIPPED | OUTPATIENT
Start: 2024-07-29

## 2024-07-29 RX ORDER — ATORVASTATIN CALCIUM 40 MG/1
40 TABLET, FILM COATED ORAL NIGHTLY
Qty: 90 TABLET | Refills: 3 | OUTPATIENT
Start: 2024-07-29

## 2024-07-29 RX ORDER — METOPROLOL TARTRATE 50 MG/1
75 TABLET, FILM COATED ORAL EVERY 12 HOURS
Qty: 270 TABLET | Refills: 3 | Status: SHIPPED | OUTPATIENT
Start: 2024-07-29

## 2024-07-29 NOTE — PROGRESS NOTES
"    CARDIOLOGY        Patient Name: Darling Morgan  :1950  Age: 73 y.o.  Primary Cardiologist: Ketan Fields MD  Encounter Provider:  SUKHJINDER Nina    Date of Service: 24      CHIEF COMPLAINT / REASON FOR OFFICE VISIT     Follow-Up and Atrial Fibrillation      HISTORY OF PRESENT ILLNESS       HPI  Darling Morgan is a 73 y.o. female who presents today for overdue annual assessment    Pt has a  history significant for PAF.    At last assessment with Dr. Fields, in , patient was having increased episodes of atrial fibrillation.  At that time beta-blocker dosing was increased.    Patient reports that she was encouraged by her PCP to follow-up with us.  Patient has had episodes of dizziness for the past 13 years.  She notes that she feels that her heart is racing and that she will blackout when these episodes occur.  She also notes sensations of loss of balance as well as nausea.  She has not been evaluated by a neurologist for these problems.  She does have history of meningioma and follows neurosurgery.  Also history of cervical spinal problems.  She reports that she has done some reading and is concerned that she has POTS.  She does not feel that she has vertigo.  She reports that when she has these episodes they are during position changes.  She is asymptomatic when sitting or laying flat.  Reports that sometimes episodes will be intermittent for days.  She currently denies chest pain.    The following portions of the patient's history were reviewed and updated as appropriate: allergies, current medications, past family history, past medical history, past social history, past surgical history and problem list.      VITAL SIGNS     Visit Vitals  /85 (BP Location: Left arm, Patient Position: Sitting)   Pulse 58   Ht 170.2 cm (67\")   Wt 50.8 kg (112 lb)   SpO2 95%   BMI 17.54 kg/m²         Wt Readings from Last 3 Encounters:   24 50.8 kg (112 lb)   24 51.7 kg (114 lb) "   05/20/24 51.4 kg (113 lb 4.8 oz)     Body mass index is 17.54 kg/m².      REVIEW OF SYSTEMS   Review of Systems   Constitutional: Negative for chills, fever, weight gain and weight loss.   Cardiovascular:  Negative for irregular heartbeat and leg swelling.   Respiratory:  Negative for cough, snoring and wheezing.    Hematologic/Lymphatic: Negative for bleeding problem. Does not bruise/bleed easily.   Skin:  Negative for color change.   Musculoskeletal:  Negative for falls, joint pain and myalgias.   Gastrointestinal:  Negative for melena.   Genitourinary:  Negative for hematuria.   Neurological:  Positive for dizziness. Negative for excessive daytime sleepiness.   Psychiatric/Behavioral:  Negative for depression. The patient is not nervous/anxious.            PHYSICAL EXAMINATION     Vitals and nursing note reviewed.   Constitutional:       Appearance: Normal appearance. Well-developed.   Eyes:      Conjunctiva/sclera: Conjunctivae normal.   Neck:      Vascular: No carotid bruit.   Pulmonary:      Breath sounds: Normal breath sounds.   Cardiovascular:      Normal rate. Regular rhythm. Normal S1 with normal intensity. Normal S2 with normal intensity.       Murmurs: There is no murmur.      No gallop.  No click. No rub.   Edema:     Peripheral edema absent.   Musculoskeletal: Normal range of motion. Skin:     General: Skin is warm and dry.   Neurological:      Mental Status: Alert and oriented to person, place, and time.      GCS: GCS eye subscore is 4. GCS verbal subscore is 5. GCS motor subscore is 6.   Psychiatric:         Speech: Speech normal.         Behavior: Behavior normal.         Thought Content: Thought content normal.         Judgment: Judgment normal.           REVIEWED DATA       ECG 12 Lead    Date/Time: 7/29/2024 3:26 PM  Performed by: Geena Prasad APRN    Authorized by: Geena Prasad APRN  Comparison: compared with previous ECG from 6/19/2023  Rhythm: sinus bradycardia  Rate:  bradycardic  BPM: 58  Conduction: conduction normal  QRS axis: normal  Other findings: non-specific ST-T wave changes    Clinical impression: non-specific ECG          Cardiac Procedures:  Cardiac catheterization 5/26/2017.  Left main is normal.  Proximal LAD is normal.  Mid to distal LAD with 20% diffuse stenosis.  Diagonal branches are normal.  Circumflex with 30-40% proximal stenosis.  RCA is large with 10-20% stenosis.  Echocardiogram 6/25/2022.  LVEF 56-60%.  Grade 2 diastolic dysfunction.  Normal RV cavity size and systolic function.  Left atrial cavity is mild to moderately dilated.  Cannot rule out bicuspid aortic valve.  Aortic valve is mildly calcified.  Moderate mitral valve regurgitation.  Mild tricuspid valve regurgitation.  Calculated RVSP 41 mmHg.    Lipid Panel          6/19/2024    10:23   Lipid Panel   Total Cholesterol 158.0       HDL Cholesterol 57       LDL Cholesterol  87.6          Details          This result is from an external source.             BUN   Date Value Ref Range Status   07/04/2022 11 8 - 23 mg/dL Final     Creatinine   Date Value Ref Range Status   03/15/2024 1.10 0.60 - 1.30 mg/dL Final     Comment:     Serial Number: [id number]:  [id number]     Potassium   Date Value Ref Range Status   07/04/2022 3.8 3.5 - 5.2 mmol/L Final     ALT (SGPT)   Date Value Ref Range Status   06/25/2022 17 1 - 33 U/L Final     AST (SGOT)   Date Value Ref Range Status   06/25/2022 18 1 - 32 U/L Final           ASSESSMENT & PLAN     Diagnoses and all orders for this visit:    1. Paroxysmal atrial fibrillation with rapid ventricular response (Primary)  Overview:  CHADS-VASc Risk Assessment               3 Total Score    1 Hypertension    1 Age 65-74    1 Sex: Female    Criteria that do not apply:    CHF    Age >/= 75    DM    PRIOR STROKE/TIA/THROMBO    Vascular Disease              Assessment & Plan:  Patient reports intermittent episodes of heart racing sensation  Continue metoprolol tartrate 75 mg  twice daily  Anticoagulated with rivaroxaban 20 mg/day      2. Primary hypertension  Assessment & Plan:  Blood pressure controlled in clinic at 130/85  Continue metoprolol tartrate 75 mg twice daily      3. Dizziness  Assessment & Plan:  Patient has been experiencing episodes of dizziness for the past 13 years.  She reports that she has episodes when she is upright and they are improved with sitting and laying flat  She reports heart racing sensation when the episodes occur.  She has never monitored her blood pressure.  Patient is concerned that she could have POTS however she has no documented evidence of hypotension or tachycardia.  Encourage patient to take her blood pressure and heart rate when she is experiencing symptoms and to report those readings to my office  Patient symptoms also sound like benign positional vertigo, she has never been evaluated by neurology.  Consider neurology evaluation      Other orders  -     atorvastatin (LIPITOR) 40 MG tablet; Take 1 tablet by mouth Every Night.  Dispense: 90 tablet; Refill: 3  -     metoprolol tartrate (LOPRESSOR) 50 MG tablet; Take 1.5 tablets by mouth Every 12 (Twelve) Hours.  Dispense: 270 tablet; Refill: 3  -     rivaroxaban (Xarelto) 20 MG tablet; Take 1 tablet by mouth Daily With Dinner. Indications: Atrial Fibrillation  Dispense: 90 tablet; Refill: 3  -     ECG 12 Lead        Return in about 1 year (around 7/29/2025) for Dr. Fields- Routine.    Future Appointments         Provider Department Center    12/13/2024 10:15 AM NADIA CT 2 [facility] CT NADIA    12/27/2024 1:00 PM Howie Campos MD [facility] RADIATION ONCOLOGY     7/30/2025 1:40 PM Ketan Fields MD [facility] MEDICAL [facility] CARDIOLOGY NADIA                MEDICATIONS         Discharge Medications            Accurate as of July 29, 2024  3:28 PM. If you have any questions, ask your nurse or doctor.                Continue These Medications        Instructions Start  Date   atorvastatin 40 MG tablet  Commonly known as: LIPITOR   40 mg, Oral, Nightly      EXCEDRIN ASPIRIN FREE PO   1 tablet, Oral, As Needed      metoprolol tartrate 50 MG tablet  Commonly known as: LOPRESSOR   75 mg, Oral, Every 12 Hours      omeprazole 20 MG capsule  Commonly known as: priLOSEC   40 mg, Oral, Daily, TAKES TWO CAPSULE BY MOUTH DAILY       potassium chloride 20 MEQ CR tablet  Commonly known as: KLOR-CON M20   20 mEq, Oral, Daily      rivaroxaban 20 MG tablet  Commonly known as: Xarelto   20 mg, Oral, Daily With Dinner      zolpidem 10 MG tablet  Commonly known as: AMBIEN   10 mg, Oral, Nightly             Stop These Medications      amLODIPine 2.5 MG tablet  Commonly known as: NORVASC  Stopped by: SUKHJINDER Zhou                  **Dragon Disclaimer:   Much of this encounter note is an electronic transcription/translation of spoken language to printed text. The electronic translation of spoken language may permit erroneous, or at times, nonsensical words or phrases to be inadvertently transcribed. Although I have reviewed the note for such errors, some may still exist.

## 2024-07-29 NOTE — ASSESSMENT & PLAN NOTE
Patient has been experiencing episodes of dizziness for the past 13 years.  She reports that she has episodes when she is upright and they are improved with sitting and laying flat  She reports heart racing sensation when the episodes occur.  She has never monitored her blood pressure.  Patient is concerned that she could have POTS however she has no documented evidence of hypotension or tachycardia.  Encourage patient to take her blood pressure and heart rate when she is experiencing symptoms and to report those readings to my office  Patient symptoms also sound like benign positional vertigo, she has never been evaluated by neurology.  Consider neurology evaluation

## 2024-07-29 NOTE — ASSESSMENT & PLAN NOTE
Patient reports intermittent episodes of heart racing sensation  Continue metoprolol tartrate 75 mg twice daily  Anticoagulated with rivaroxaban 20 mg/day

## 2024-08-30 RX ORDER — RIVAROXABAN 20 MG/1
TABLET, FILM COATED ORAL
Qty: 90 TABLET | Refills: 3 | Status: SHIPPED | OUTPATIENT
Start: 2024-08-30

## 2024-12-13 ENCOUNTER — HOSPITAL ENCOUNTER (OUTPATIENT)
Dept: CT IMAGING | Facility: HOSPITAL | Age: 74
Discharge: HOME | End: 2024-12-13
Payer: MEDICARE

## 2024-12-13 DIAGNOSIS — C34.91 PRIMARY ADENOCARCINOMA OF RIGHT LUNG: ICD-10-CM

## 2024-12-13 PROCEDURE — 71250 CT THORAX DX C-: CPT

## 2024-12-20 DIAGNOSIS — C34.91 PRIMARY ADENOCARCINOMA OF RIGHT LUNG: Primary | ICD-10-CM

## 2024-12-20 RX ORDER — DOXYCYCLINE 100 MG/1
100 CAPSULE ORAL 2 TIMES DAILY
Qty: 14 CAPSULE | Refills: 0 | Status: SHIPPED | OUTPATIENT
Start: 2024-12-20

## 2024-12-31 ENCOUNTER — TELEPHONE (OUTPATIENT)
Dept: RADIATION ONCOLOGY | Facility: HOSPITAL | Age: 74
End: 2024-12-31
Payer: MEDICARE

## 2024-12-31 NOTE — PROGRESS NOTES
EXAMINATION TYPE: IR angio abdominal w runoff

 

DATE OF EXAM: 3/23/2021

 

COMPARISON: NONE

 

HISTORY: Fluoroscopy time.

 

Fluoroscopy was provided to the referring clinician. [facility] Radiation Oncology   Follow Up    Chief Complaint  RLL Adenocarcinoma        Diagnosis: Adenocarcinoma of the Right Lower Lobe     Overall Stage IA3     cT1c: 2.5cm on imaging  cN0: per PET CT, will need to monitor right hilar lymph node  cM0: per PET CT        Radiation Completion Date: 5/30/2023        Prescription:      Site: RLL  Laterality: Right  Total Dose: 4800cGy  Dose per Fraction: 1200cGy  Total Fractions: 4  Daily or BID: Daily  Modality: Photon  Technique: SBRT (2-5fx)  Bolus: No      Interval History:    Darling Morgan presents for regularly scheduled follow-up approximately 19 months after completion of radiation therapy for an adenocarcinoma of the right lower lobe.  The patient tolerated treatment well and has done fairly well in the interim.  She does struggle with chronic cough but no new or concerning pulmonary complaints today.      Imaging:      CT Chest 12/13/2024    IMPRESSION:  1. Linear soft tissue thickening at the site of the previously  demonstrated cavitary nodule in the right lower lobe has increased in  thickness when compared to the previous CT. This could be due to disease  progression or increased posttreatment change and needs follow-up on  subsequent exam.  2. Right lateral 7th rib fracture is new when compared to the prior exam  06/07/2024.  3. 1.3 cm subcarinal lymph node without change.      Pathology:      No new relevant pathology      Labs:    Lab Results   Component Value Date    CREATININE 1.10 03/15/2024             Problem List:  Patient Active Problem List   Diagnosis    Benign neoplasm of cerebral meninges    Cerebral meningioma    Postmenopausal status    Atrophic vaginitis    Lung nodule    Paroxysmal atrial fibrillation with rapid ventricular response    Emphysema lung    GERD (gastroesophageal reflux disease)    History of COVID-19    Hypertension    Stroke-like symptoms    Follow-up exam    Acute posthemorrhagic anemia    Hypopotassemia    Abnormal PET scan  "of colon    Encounter for colonoscopy due to history of adenomatous colonic polyps    Dizziness          Medications:  Current Outpatient Medications on File Prior to Visit   Medication Sig Dispense Refill    Acetaminophen-Caffeine (EXCEDRIN ASPIRIN FREE PO) Take 1 tablet by mouth As Needed.      atorvastatin (LIPITOR) 40 MG tablet Take 1 tablet by mouth Every Night. 90 tablet 3    doxycycline (VIBRAMYCIN) 100 MG capsule Take 1 capsule by mouth 2 (Two) Times a Day. 14 capsule 0    metoprolol tartrate (LOPRESSOR) 50 MG tablet Take 1.5 tablets by mouth Every 12 (Twelve) Hours. 270 tablet 3    omeprazole (priLOSEC) 20 MG capsule Take 2 capsules by mouth Daily. TAKES TWO CAPSULE BY MOUTH DAILY      potassium chloride (K-DUR,KLOR-CON) 20 MEQ CR tablet Take 1 tablet by mouth Daily. 90 tablet 3    Xarelto 20 MG tablet TAKE 1 TABLET BY MOUTH DAILY WITH DINNER FOR ATRIAL FIBRILLATION 90 tablet 3    zolpidem (AMBIEN) 10 MG tablet Take 1 tablet by mouth Every Night.       No current facility-administered medications on file prior to visit.          Allergies:  No Known Allergies        Vital Signs:  /77   Pulse 72   Wt 50.3 kg (111 lb)   SpO2 96%   BMI 17.39 kg/m²   Estimated body mass index is 17.39 kg/m² as calculated from the following:    Height as of 7/29/24: 170.2 cm (67\").    Weight as of this encounter: 50.3 kg (111 lb).  Pain Score    01/02/25 1158   PainSc: 0-No pain         ECOG: Restricted in physically strenuous activity but ambulatory and able to carry out work of a light or sedentary nature, e.g., light house work, office work = 1    Physical Exam  Vitals reviewed.   Constitutional:       General: She is not in acute distress.     Appearance: Normal appearance.   HENT:      Head: Normocephalic and atraumatic.   Eyes:      Extraocular Movements: Extraocular movements intact.      Pupils: Pupils are equal, round, and reactive to light.   Pulmonary:      Effort: Pulmonary effort is normal.   Abdominal:    "   General: Abdomen is flat.      Palpations: Abdomen is soft.   Musculoskeletal:      Cervical back: Normal range of motion.   Skin:     General: Skin is warm and dry.   Neurological:      General: No focal deficit present.      Mental Status: She is alert and oriented to person, place, and time.   Psychiatric:         Mood and Affect: Mood normal.         Behavior: Behavior normal.      \F2    Result Review :  The following data was reviewed by: Howie Campos MD on 01/02/2025:  Labs: Last Creatinine   Data reviewed : Radiologic studies CT Chest             Diagnoses and all orders for this visit:    1. Primary adenocarcinoma of right lung (Primary)        Assessment:    Darling Morgan presents for regularly scheduled follow-up approximately 19 months after completion of radiation therapy for an adenocarcinoma of the right lower lobe.  The patient tolerated treatment well and has done fairly well in the interim.  She does struggle with chronic cough but no new or concerning pulmonary complaints today.    I met with the patient and discussed her interval imaging in detail.  Overall, there has been some increased linear thickening along her previously treated right lung.  This could represent postradiation fibrosis or potentially progression of previously treated disease.  I will order interval CT imaging for next month to evaluate.  If there is additional concern we would move onto PET/CT.      Plan:    -Plan for short interval chest CT to evaluate increased thickening in the previously treated right lung.       I spent 30 minutes caring for Darling on this date of service. This time includes time spent by me in the following activities:preparing for the visit, reviewing tests, obtaining and/or reviewing a separately obtained history, documenting information in the medical record, independently interpreting results and communicating that information with the patient/family/caregiver, and care coordination  Follow Up   No  follow-ups on file.  Patient was given instructions and counseling regarding her condition or for health maintenance advice. Please see specific information pulled into the AVS if appropriate.     Howie Campos MD

## 2025-01-02 ENCOUNTER — OFFICE VISIT (OUTPATIENT)
Dept: RADIATION ONCOLOGY | Facility: HOSPITAL | Age: 75
End: 2025-01-02
Payer: MEDICARE

## 2025-01-02 VITALS
WEIGHT: 111 LBS | BODY MASS INDEX: 17.39 KG/M2 | OXYGEN SATURATION: 96 % | HEART RATE: 72 BPM | SYSTOLIC BLOOD PRESSURE: 132 MMHG | DIASTOLIC BLOOD PRESSURE: 77 MMHG

## 2025-01-02 DIAGNOSIS — C34.91 PRIMARY ADENOCARCINOMA OF RIGHT LUNG: Primary | ICD-10-CM

## 2025-01-03 ENCOUNTER — TELEPHONE (OUTPATIENT)
Dept: RADIATION ONCOLOGY | Facility: HOSPITAL | Age: 75
End: 2025-01-03
Payer: MEDICARE

## 2025-01-03 NOTE — TELEPHONE ENCOUNTER
Called PT to schedule her CT and follow up with . PT verbally understood the dates and times and informed her they would be available in her MyChart as well.

## 2025-02-13 ENCOUNTER — HOSPITAL ENCOUNTER (OUTPATIENT)
Dept: CT IMAGING | Facility: HOSPITAL | Age: 75
Discharge: HOME | End: 2025-02-13
Admitting: STUDENT IN AN ORGANIZED HEALTH CARE EDUCATION/TRAINING PROGRAM
Payer: MEDICARE

## 2025-02-13 DIAGNOSIS — C34.91 PRIMARY ADENOCARCINOMA OF RIGHT LUNG: ICD-10-CM

## 2025-02-13 PROCEDURE — 71250 CT THORAX DX C-: CPT

## 2025-02-21 ENCOUNTER — DOCUMENTATION (OUTPATIENT)
Dept: RADIATION ONCOLOGY | Facility: HOSPITAL | Age: 75
End: 2025-02-21
Payer: MEDICARE

## 2025-02-21 NOTE — PROGRESS NOTES
Patient called asking why she did not recived a call about her CT results. Had to cancel follow-up due to being sick

## 2025-03-05 DIAGNOSIS — C34.91 PRIMARY ADENOCARCINOMA OF RIGHT LUNG: Primary | ICD-10-CM

## 2025-07-07 ENCOUNTER — HOSPITAL ENCOUNTER (OUTPATIENT)
Dept: CT IMAGING | Facility: HOSPITAL | Age: 75
Discharge: HOME | End: 2025-07-07
Admitting: STUDENT IN AN ORGANIZED HEALTH CARE EDUCATION/TRAINING PROGRAM
Payer: MEDICARE

## 2025-07-07 DIAGNOSIS — C34.91 PRIMARY ADENOCARCINOMA OF RIGHT LUNG: ICD-10-CM

## 2025-07-07 PROCEDURE — 71250 CT THORAX DX C-: CPT

## 2025-07-11 ENCOUNTER — TELEPHONE (OUTPATIENT)
Dept: RADIATION ONCOLOGY | Facility: HOSPITAL | Age: 75
End: 2025-07-11
Payer: MEDICARE

## 2025-07-14 ENCOUNTER — OFFICE VISIT (OUTPATIENT)
Dept: RADIATION ONCOLOGY | Facility: HOSPITAL | Age: 75
End: 2025-07-14
Payer: MEDICARE

## 2025-07-14 VITALS
HEART RATE: 73 BPM | SYSTOLIC BLOOD PRESSURE: 149 MMHG | RESPIRATION RATE: 16 BRPM | WEIGHT: 101.8 LBS | DIASTOLIC BLOOD PRESSURE: 88 MMHG | BODY MASS INDEX: 15.94 KG/M2 | OXYGEN SATURATION: 98 %

## 2025-07-14 DIAGNOSIS — C34.91 PRIMARY ADENOCARCINOMA OF RIGHT LUNG: Primary | ICD-10-CM

## 2025-07-14 PROCEDURE — G0463 HOSPITAL OUTPT CLINIC VISIT: HCPCS | Performed by: STUDENT IN AN ORGANIZED HEALTH CARE EDUCATION/TRAINING PROGRAM

## 2025-07-14 RX ORDER — TRAZODONE HYDROCHLORIDE 50 MG/1
50 TABLET ORAL NIGHTLY
COMMUNITY
Start: 2025-01-27 | End: 2026-01-27

## 2025-07-14 NOTE — PROGRESS NOTES
[facility] Radiation Oncology   Follow Up    Chief Complaint  RLL Adenocarcinoma        Diagnosis: Adenocarcinoma of the Right Lower Lobe     Overall Stage IA3     cT1c: 2.5cm on imaging  cN0: per PET CT, will need to monitor right hilar lymph node  cM0: per PET CT        Radiation Completion Date: 5/30/2023        Prescription:      Site: RLL  Laterality: Right  Total Dose: 4800cGy  Dose per Fraction: 1200cGy  Total Fractions: 4  Daily or BID: Daily  Modality: Photon  Technique: SBRT (2-5fx)  Bolus: No      Interval History:    Darling Morgan presents for regularly scheduled follow-up approximately 19 months after completion of radiation therapy for an adenocarcinoma of the right lower lobe.  The patient tolerated treatment well and has done fairly well in the interim.  She does struggle with chronic cough, which she thinks is a bit worse than previous.  She has had some unintentional weight loss of approximately 10% of her body weight in the past several months.  She reports that she is eating but struggling to maintain weight.  She does not see a pulmonologist.      Imaging:      CT Chest 7/7/2025    IMPRESSION:     1.  New 0.4 cm right lower lobe nodule, nonspecific. Recommend follow-up  CT in 3 months.   2.  Additional small nodules and a focal opacity at the right lung apex  are similar to prior. Recommend attention on follow-up.   3.  Status post right lower lobe wedge resection. Masslike consolidation  in the ventral right lower lobe is similar to prior. Recommend attention  on follow-up.      Pathology:      No new relevant pathology       Labs:    Lab Results   Component Value Date    CREATININE 1.10 03/15/2024             Problem List:  Patient Active Problem List   Diagnosis    Benign neoplasm of cerebral meninges    Cerebral meningioma    Postmenopausal status    Atrophic vaginitis    Lung nodule    Paroxysmal atrial fibrillation with rapid ventricular response    Emphysema lung    GERD (gastroesophageal  "reflux disease)    History of COVID-19    Hypertension    Stroke-like symptoms    Follow-up exam    Acute posthemorrhagic anemia    Hypopotassemia    Abnormal PET scan of colon    Encounter for colonoscopy due to history of adenomatous colonic polyps    Dizziness          Medications:  Current Outpatient Medications on File Prior to Visit   Medication Sig Dispense Refill    traZODone (DESYREL) 50 MG tablet Take 1 tablet by mouth Every Night.      Acetaminophen-Caffeine (EXCEDRIN ASPIRIN FREE PO) Take 1 tablet by mouth As Needed.      atorvastatin (LIPITOR) 40 MG tablet Take 1 tablet by mouth Every Night. 90 tablet 3    doxycycline (VIBRAMYCIN) 100 MG capsule Take 1 capsule by mouth 2 (Two) Times a Day. 14 capsule 0    metoprolol tartrate (LOPRESSOR) 50 MG tablet Take 1.5 tablets by mouth Every 12 (Twelve) Hours. 270 tablet 3    omeprazole (priLOSEC) 20 MG capsule Take 2 capsules by mouth Daily. TAKES TWO CAPSULE BY MOUTH DAILY      potassium chloride (K-DUR,KLOR-CON) 20 MEQ CR tablet Take 1 tablet by mouth Daily. 90 tablet 3    Xarelto 20 MG tablet TAKE 1 TABLET BY MOUTH DAILY WITH DINNER FOR ATRIAL FIBRILLATION 90 tablet 3    zolpidem (AMBIEN) 10 MG tablet Take 1 tablet by mouth Every Night. (Patient not taking: Reported on 7/14/2025)       No current facility-administered medications on file prior to visit.          Allergies:  No Known Allergies        Vital Signs:  /88   Pulse 73   Resp 16   Wt 46.2 kg (101 lb 12.8 oz)   SpO2 98%   BMI 15.94 kg/m²   Estimated body mass index is 15.94 kg/m² as calculated from the following:    Height as of 7/29/24: 170.2 cm (67\").    Weight as of this encounter: 46.2 kg (101 lb 12.8 oz).  Pain Score    07/14/25 1303   PainSc: 0-No pain         ECOG: Restricted in physically strenuous activity but ambulatory and able to carry out work of a light or sedentary nature, e.g., light house work, office work = 1    Physical Exam  Vitals reviewed.   Constitutional:       " General: She is not in acute distress.     Appearance: Normal appearance.   HENT:      Head: Normocephalic and atraumatic.   Eyes:      Extraocular Movements: Extraocular movements intact.      Pupils: Pupils are equal, round, and reactive to light.   Pulmonary:      Effort: Pulmonary effort is normal.   Abdominal:      General: Abdomen is flat.      Palpations: Abdomen is soft.   Musculoskeletal:      Cervical back: Normal range of motion.   Skin:     General: Skin is warm and dry.   Neurological:      General: No focal deficit present.      Mental Status: She is alert and oriented to person, place, and time.   Psychiatric:         Mood and Affect: Mood normal.         Behavior: Behavior normal.          Result Review :  The following data was reviewed by: Howie Campos MD on 07/14/2025:  Labs: Last Creatinine   CT Chest            Diagnoses and all orders for this visit:    1. Primary adenocarcinoma of right lung (Primary)  -     Ambulatory Referral to OP ONC Nutrition Services  -     Ambulatory Referral to Pulmonology  -     CT Chest Without Contrast; Future        Assessment:    Darling Morgan presents for regularly scheduled follow-up approximately 19 months after completion of radiation therapy for an adenocarcinoma of the right lower lobe.  The patient tolerated treatment well and has done fairly well in the interim.  She does struggle with chronic cough, which she thinks is a bit worse than previous.  She has had some unintentional weight loss of approximately 10% of her body weight in the past several months.  She reports that she is eating but struggling to maintain weight.  She does not see a pulmonologist.    I met with the patient and reviewed the results of her most recent imaging in detail.  Overall, this is consistent with stability of her previously treated disease.  She has a small right sided pulmonary nodule which is new and will require surveillance.  Will repeat her CT chest in 6 months.  I am  concerned about her ongoing struggles with  maintaining her weight.  I will start with referral to nutrition.  With regards to her ongoing worsening shortness of breath/cough symptoms, I will refer her to pulmonology as she has not previously established.      Plan:    - Follow-up with me in 6 months with repeat CT chest  - Referral to dietitian  - Referral to Dr. Fung with pulmonology       I spent 30 minutes caring for Darling on this date of service. This time includes time spent by me in the following activities:preparing for the visit, reviewing tests, obtaining and/or reviewing a separately obtained history, documenting information in the medical record, independently interpreting results and communicating that information with the patient/family/caregiver, and care coordination  Follow Up   No follow-ups on file.  Patient was given instructions and counseling regarding her condition or for health maintenance advice. Please see specific information pulled into the AVS if appropriate.     Howie Campos MD

## 2025-07-17 ENCOUNTER — TELEMEDICINE - AUDIO (OUTPATIENT)
Dept: OTHER | Facility: HOSPITAL | Age: 75
End: 2025-07-17
Payer: MEDICARE

## 2025-07-17 NOTE — PROGRESS NOTES
"OUTPATIENT ONCOLOGY NUTRITION ASSESSMENT    Patient Name: Darling Morgan  YOB: 1950  MRN: [id number]  Assessment Date: 7/17/2025    COMMENTS:  Referral from radiation oncology due to ongoing weight loss.    Spoke to the patient on the phone today.  She completed radiation 5/30/23 and follows with Dr Campos.   Reports that her usual weight was 128-130 lb. Since completing radiation her weight has gradually decreased.  She does not feel she is eating any differently. Reports having \"dizzy spells\" especially in the morning if she eats breakfast. She relates this to feeling short of breath when she tries to eat or feels to full and this makes it harder to breathe.   She has been eating sweets, eats one meal a day usually, and snacks a little. Not cooking as much but her  does some cooking.   She is planning on purchasing Boost at our retail pharmacy at the discounted price.    Suggested that she use Boost as a supplement between meals and not as a meal replacement. Also recommended that she eat small frequent meals with calorically dense high protein foods. Gave some ideas but also mailing her a list and ideas on how to optimize calories and protein in her snacks and meals for weight gain.   Will be available for any questions.            Reason for Assessment Physician referral, New assessment, Education      Diagnosis/Problem   Adenocarcinoma R lower lobe   Treatment Plan Comment:  completed radiation in 2023         Encounter Information        Nutrition/Diet History:  Eats one meal a day, decreased appetite, shortness of breath   Oral Nutrition Supplements: Planning on getting some boost plus   Factors/Symptoms Affecting Intake: Anorexia, Early satiety, Weight loss   Comments:      Medical/Surgical History Past Medical History:   Diagnosis Date    AC joint arthropathy     ADD (attention deficit disorder)     Anxiety     Arthritis     Atrial fibrillation     DR RENDON    Fuentes esophagus     " Bell's palsy     Bigeminy     Cancer of right lung     DR. LAZARO KIDD, NEW DIAGNOSIS    Chronic pain     BILATERAL LEGS    Colon polyps     Depression     Dysphagia     FEELS LIKE SOMETHING STUCK IN HER THROAT AT TIMES    Dysrhythmia 06/2022    Afib    Emphysema lung     GERD (gastroesophageal reflux disease)     Hemorrhoid     History of COVID-19     6/8/2022 IN [facility]    History of fusion of cervical spine     History of stomach ulcers     History of transfusion     Hx of radiation therapy 06/2023    Hyperlipidemia     Hypertension     Insomnia     Lung nodule     RIGHT LOBE    Meningioma     On anticoagulant therapy     Postmenopausal status 08/29/2013    Staphylococcal infection     AFTER NECK SURGERY IN 1980'S       Past Surgical History:   Procedure Laterality Date    ANTERIOR CERVICAL DISCECTOMY W/ FUSION      BACK SURGERY      LUMBAR LAMINECTOMY    CARDIAC CATHETERIZATION N/A 05/26/2017    Procedure: Coronary angiography;  Surgeon: William Raphael MD;  [facility];  Service:     CARDIAC CATHETERIZATION N/A 05/26/2017    Procedure: Left heart cath;  Surgeon: William Raphael MD;  Location:  [facility] CATH INVASIVE LOCATION;  Service:     CARDIAC CATHETERIZATION N/A 05/26/2017    Procedure: Left ventriculography;  Surgeon: William Raphael MD;  [facility] C[facility];  Service:     COLONOSCOPY  2008    COLONOSCOPY N/A 5/1/2023    Procedure: COLONOSCOPY into cecum and TI with hot and cold snare polypectomies;  Surgeon: Geena Miller MD;  Location: [facility] ENDOSCOPY;  Service: Gastroenterology;  Laterality: N/A;  pre- abnormal PET scan  post- diverticulosis, polyps, hemorrhoids    COLONOSCOPY N/A 5/20/2024    Procedure: COLONOSCOPY cecum and TI with hot snare/cold biopsy/cold snare polypectomies;  Surgeon: Geena Miller MD;  Location: [facility] ENDOSCOPY;  Service: Gastroenterology;  Laterality: N/A;  Pre - h/o colon polyps.  post - diverticulosis, polyps, hemorrhoids    D & C AND  "LAPAROSCOPY      WITH BLOOD TRANSFUSION.    HYSTERECTOMY      INCISION AND DRAINAGE ABSCESS POSTERIOR CERVICALSPINE      FROM INFECTION AFTER CERVICAL SPINE SURGERY IN 1980'S    NECK SURGERY  1986    for herniated disk and got staph infection.    THORACOSCOPY Right 06/29/2022    Procedure: BRONCHOSCOPY, RIGHT VIDEO ASSISTED THORACOSCOPY WITH DAVINCI ROBOT WITH RIGHT LOWER LOBE WEDGE RESECTION x2 WITH INTERCOSTAL NERVE BLOCKS AND MEDIASTINAL LYMPHADENECTOMY;  Surgeon: Bharathi Quinones III, MD;  Location: [facility];  Service: Robotics - DaVinci;  Laterality: Right;    VEIN LIGATION AND STRIPPING BILATERAL Right                Anthropometrics        Current Height Ht Readings from Last 1 Encounters:   07/29/24 170.2 cm (67\")      Current Weight Wt Readings from Last 1 Encounters:   07/14/25 46.2 kg (101 lb 12.8 oz)      BMI  15.8   Ideal Body Weight (IBW) 135 lb   Usual Body Weight (UBW) 128-130 lb   Weight Change/Trend Loss   Weight History Wt Readings from Last 30 Encounters:   07/14/25 1303 46.2 kg (101 lb 12.8 oz)   01/02/25 1158 50.3 kg (111 lb)   07/29/24 1341 50.8 kg (112 lb)   06/14/24 1321 51.7 kg (114 lb)   05/20/24 1350 51.4 kg (113 lb 4.8 oz)   03/22/24 1313 53.2 kg (117 lb 3.2 oz)   12/13/23 1211 52.8 kg (116 lb 6.4 oz)   09/06/23 1141 54.8 kg (120 lb 12.8 oz)   06/19/23 1305 57 kg (125 lb 9.6 oz)   05/24/23 1347 55.8 kg (123 lb)   05/16/23 0751 54.9 kg (121 lb)   05/01/23 0907 55.8 kg (123 lb)   04/28/23 1017 55.8 kg (123 lb)   04/19/23 1310 55.6 kg (122 lb 8 oz)   03/22/23 0905 54.4 kg (120 lb)   03/08/23 1317 54.4 kg (120 lb)   02/10/23 1402 53.5 kg (118 lb)   12/06/22 1415 55.7 kg (122 lb 12.8 oz)   08/17/22 1000 54.9 kg (121 lb)   08/05/22 1302 55.1 kg (121 lb 6.4 oz)   07/13/22 0851 55.8 kg (123 lb)   06/29/22 0500 55.9 kg (123 lb 4.8 oz)   06/25/22 0926 54.9 kg (121 lb 0.5 oz)   06/24/22 1454 54.9 kg (121 lb)   06/24/22 1132 54.9 kg (121 lb 0.5 oz)   06/24/22 1948 54.9 kg (121 lb)   06/20/22 " "1645 55.1 kg (121 lb 6.4 oz)   06/15/22 1041 54.4 kg (120 lb)   05/19/22 1041 54.9 kg (121 lb)   04/22/20 0937 61.1 kg (134 lb 9.6 oz)   05/26/17 0949 65.3 kg (144 lb)   05/10/17 1313 66.8 kg (147 lb 3.2 oz)   04/28/17 1451 65.8 kg (145 lb)   03/16/16 1611 61.2 kg (135 lb)          Medications           Current medications: Acetaminophen-Caffeine, atorvastatin, doxycycline, metoprolol tartrate, omeprazole, potassium chloride, rivaroxaban, traZODone, and zolpidem                 Labs       Pertinent Labs          Invalid input(s): \"LABALBU\", \"PROT\"          COVID19   Date Value Ref Range Status   06/27/2022 Not Detected Not Detected - Ref. Range Final     No results found for: \"HGBA1C\"           Estimated/Assessed Needs        Energy Requirements    Height for Calculation      Weight for Calculation 46 kg   Method for Estimation  35 kcal/kg, 40 kcal/kg   EST Needs (kcal/day) 6983-9096 kcals/day for weight gain       Protein Requirements    Weight for Calculation 46 kg   EST Protein Needs (g/kg) 1.5 gm/kg   EST Daily Needs (g/day) 70 g/day       Fluid Requirements     Method for Estimation 1 mL/kcal    Estimated Needs (mL/day) 7048-9194 mL/day         NUTRITION INTERVENTION / PLAN OF CARE      Intervention Goal(s) Provide information, Increase intake, and Appropriate weight gain         RD Intervention/Action Recommended/ordered         Recommendations:       PO Diet Increase calories and protein, small frequent meals      Supplements Boost plus 2 per day, in between meals      Snacks       Other          Monitor/Evaluation PO intake, Supplement intake, Pertinent labs, Weight, Symptoms, Follow up as needed   Education Education provided           Electronically signed by:  Nicolette Pink RD, LD  07/17/25 11:56 EDT      "

## 2025-07-30 ENCOUNTER — OFFICE VISIT (OUTPATIENT)
Dept: CARDIOLOGY | Age: 75
End: 2025-07-30
Payer: MEDICARE

## 2025-07-30 VITALS
DIASTOLIC BLOOD PRESSURE: 80 MMHG | WEIGHT: 102 LBS | HEIGHT: 67 IN | HEART RATE: 58 BPM | SYSTOLIC BLOOD PRESSURE: 138 MMHG | BODY MASS INDEX: 16.01 KG/M2

## 2025-07-30 DIAGNOSIS — I10 PRIMARY HYPERTENSION: ICD-10-CM

## 2025-07-30 DIAGNOSIS — I34.0 MODERATE MITRAL REGURGITATION: ICD-10-CM

## 2025-07-30 DIAGNOSIS — I48.0 PAROXYSMAL ATRIAL FIBRILLATION WITH RAPID VENTRICULAR RESPONSE: Primary | ICD-10-CM

## 2025-07-30 PROBLEM — I25.10 CORONARY ARTERY CALCIFICATION: Status: ACTIVE | Noted: 2025-07-30

## 2025-07-30 PROCEDURE — 3079F DIAST BP 80-89 MM HG: CPT | Performed by: INTERNAL MEDICINE

## 2025-07-30 PROCEDURE — 93000 ELECTROCARDIOGRAM COMPLETE: CPT | Performed by: INTERNAL MEDICINE

## 2025-07-30 PROCEDURE — 3075F SYST BP GE 130 - 139MM HG: CPT | Performed by: INTERNAL MEDICINE

## 2025-07-30 PROCEDURE — 99214 OFFICE O/P EST MOD 30 MIN: CPT | Performed by: INTERNAL MEDICINE

## 2025-07-30 NOTE — PROGRESS NOTES
"      CARDIOLOGY    Ketan Fields MD    ENCOUNTER DATE:  07/30/2025    Darling Morgan / 74 y.o. / female        CHIEF COMPLAINT / REASON FOR OFFICE VISIT     Paroxysmal atrial fibrillation with rapid ventricular respo (07/29/2024 Follow up)  Coronary artery calcification  Hypertension  Moderate mitral regurgitation    HISTORY OF PRESENT ILLNESS       HPI  Darling Morgan is a 74 y.o. female who presents today for reevaluation.  Patient had lung cancer unfortunately she continues to smoke.  She says she is really trying to stop or least cut it down significantly.  Patient is COPD she does have rhonchi she just saw Dr. Fung the other day put her on a new inhaler.  She is lost quite a bit of weight she is 102 pounds.      The following portions of the patient's history were reviewed and updated as appropriate: allergies, current medications, past family history, past medical history, past social history, past surgical history and problem list.      VITAL SIGNS     Visit Vitals  /80 (BP Location: Left arm)   Pulse 58   Ht 170.2 cm (67\")   Wt 46.3 kg (102 lb)   BMI 15.98 kg/m²         Wt Readings from Last 3 Encounters:   07/30/25 46.3 kg (102 lb)   07/14/25 46.2 kg (101 lb 12.8 oz)   01/02/25 50.3 kg (111 lb)     Body mass index is 15.98 kg/m².      REVIEW OF SYSTEMS   ROS        PHYSICAL EXAMINATION     Vitals reviewed.   Constitutional:       Appearance: Cachectic and frail.   Pulmonary:      Breath sounds: Diffuse Rhonchi present.   Cardiovascular:      Normal rate. Regular rhythm. Normal S1. Normal S2.       Murmurs: There is no murmur.      No gallop.  No click. No rub.   Pulses:     Intact distal pulses.   Edema:     Peripheral edema absent.   Neurological:      Mental Status: Alert.           REVIEWED DATA       ECG 12 Lead    Date/Time: 7/30/2025 2:10 PM  Performed by: Ketan Fields MD    Authorized by: Ketan Fields MD  Comparison: compared with previous ECG from 7/29/2024  Similar to " previous ECG  Rhythm: sinus rhythm  Other findings: left ventricular hypertrophy with strain    Clinical impression: abnormal EKG          Cardiac Procedures:      Lipid Panel          1/20/2025    10:43   Lipid Panel   Total Cholesterol 174       HDL Cholesterol 70       LDL Cholesterol  89.6          Details          This result is from an external source.                 ASSESSMENT & PLAN      Diagnosis Plan   1. Paroxysmal atrial fibrillation with rapid ventricular response        2. Primary hypertension        3. Moderate mitral regurgitation              SUMMARY/DISCUSSION  Hypertension blood pressure is fine  Paroxysmal A-fib.  Patient remains in normal sinus rhythm today no issues with her Xarelto.  Moderate mitral regurgitation.  Last echo was in 2022 we will repeat her echocardiogram to reassess.  Lung cancer/COPD followed by Dr. Fung.  Unfortunately patient continues to smoke try to reinforce stopping she knows she is doing the best she can.  Follow-up 1 year if her echo is unremarkable.        MEDICATIONS         Discharge Medications            Accurate as of July 30, 2025  2:12 PM. If you have any questions, ask your nurse or doctor.                Continue These Medications        Instructions Start Date   atorvastatin 40 MG tablet  Commonly known as: LIPITOR   40 mg, Oral, Nightly      doxycycline 100 MG capsule  Commonly known as: VIBRAMYCIN   100 mg, Oral, 2 Times Daily      EXCEDRIN ASPIRIN FREE PO   1 tablet, As Needed      metoprolol tartrate 50 MG tablet  Commonly known as: LOPRESSOR   75 mg, Oral, Every 12 Hours      omeprazole 20 MG capsule  Commonly known as: priLOSEC   40 mg, Daily      potassium chloride 20 MEQ CR tablet  Commonly known as: KLOR-CON M20   20 mEq, Oral, Daily      traZODone 50 MG tablet  Commonly known as: DESYREL   50 mg, Nightly      Xarelto 20 MG tablet  Generic drug: rivaroxaban   TAKE 1 TABLET BY MOUTH DAILY WITH DINNER FOR ATRIAL FIBRILLATION      zolpidem 10 MG  tablet  Commonly known as: AMBIEN   10 mg, Nightly                   **Dragon Disclaimer:   Much of this encounter note is an electronic transcription/translation of spoken language to printed text. The electronic translation of spoken language may permit erroneous, or at times, nonsensical words or phrases to be inadvertently transcribed. Although I have reviewed the note for such errors, some may still exist.

## 2025-08-13 ENCOUNTER — HOSPITAL ENCOUNTER (OUTPATIENT)
Dept: CARDIOLOGY | Facility: HOSPITAL | Age: 75
Discharge: HOME | End: 2025-08-13
Admitting: INTERNAL MEDICINE
Payer: MEDICARE

## 2025-08-13 VITALS
HEIGHT: 67 IN | SYSTOLIC BLOOD PRESSURE: 150 MMHG | DIASTOLIC BLOOD PRESSURE: 90 MMHG | WEIGHT: 102 LBS | BODY MASS INDEX: 16.01 KG/M2 | HEART RATE: 78 BPM

## 2025-08-13 DIAGNOSIS — I34.0 MODERATE MITRAL REGURGITATION: ICD-10-CM

## 2025-08-13 DIAGNOSIS — I48.0 PAROXYSMAL ATRIAL FIBRILLATION WITH RAPID VENTRICULAR RESPONSE: ICD-10-CM

## 2025-08-13 DIAGNOSIS — I10 PRIMARY HYPERTENSION: ICD-10-CM

## 2025-08-13 LAB
AORTIC ARCH: 1.8 CM
AORTIC DIMENSIONLESS INDEX: 0.64 (DI)
ASCENDING AORTA: 2.9 CM
AV MEAN PRESS GRAD SYS DOP V1V2: 1.99 MMHG
AV VMAX SYS DOP: 91.5 CM/SEC
BH CV ECHO LEFT ATRIAL STRAIN: 7.3 %
BH CV ECHO LEFT VENTRICLE GLOBAL LONGITUDINAL STRAIN: -20.6 %
BH CV ECHO MEAS - ACS: 1.6 CM
BH CV ECHO MEAS - AO MAX PG: 3.4 MMHG
BH CV ECHO MEAS - AO ROOT DIAM: 3.3 CM
BH CV ECHO MEAS - AO V2 VTI: 19.3 CM
BH CV ECHO MEAS - AVA(I,D): 1.87 CM2
BH CV ECHO MEAS - EDV(CUBED): 85.2 ML
BH CV ECHO MEAS - EDV(MOD-SP2): 53 ML
BH CV ECHO MEAS - EDV(MOD-SP4): 60 ML
BH CV ECHO MEAS - EF(MOD-SP2): 58.5 %
BH CV ECHO MEAS - EF(MOD-SP4): 53.3 %
BH CV ECHO MEAS - ESV(CUBED): 29 ML
BH CV ECHO MEAS - ESV(MOD-SP2): 22 ML
BH CV ECHO MEAS - ESV(MOD-SP4): 28 ML
BH CV ECHO MEAS - FS: 30.2 %
BH CV ECHO MEAS - IVS/LVPW: 1 CM
BH CV ECHO MEAS - IVSD: 1 CM
BH CV ECHO MEAS - LAT PEAK E' VEL: 10.1 CM/SEC
BH CV ECHO MEAS - LV DIASTOLIC VOL/BSA (35-75): 39.5 CM2
BH CV ECHO MEAS - LV MASS(C)D: 147.8 GRAMS
BH CV ECHO MEAS - LV MAX PG: 1.61 MMHG
BH CV ECHO MEAS - LV MEAN PG: 0.75 MMHG
BH CV ECHO MEAS - LV SYSTOLIC VOL/BSA (12-30): 18.4 CM2
BH CV ECHO MEAS - LV V1 MAX: 63.4 CM/SEC
BH CV ECHO MEAS - LV V1 VTI: 12.3 CM
BH CV ECHO MEAS - LVIDD: 4.4 CM
BH CV ECHO MEAS - LVIDS: 3.1 CM
BH CV ECHO MEAS - LVOT AREA: 2.9 CM2
BH CV ECHO MEAS - LVOT DIAM: 1.93 CM
BH CV ECHO MEAS - LVPWD: 1 CM
BH CV ECHO MEAS - MED PEAK E' VEL: 8.1 CM/SEC
BH CV ECHO MEAS - MR MAX PG: 92.6 MMHG
BH CV ECHO MEAS - MR MAX VEL: 481.1 CM/SEC
BH CV ECHO MEAS - MV A DUR: 0.08 SEC
BH CV ECHO MEAS - MV A MAX VEL: 39 CM/SEC
BH CV ECHO MEAS - MV DEC SLOPE: 482.7 CM/SEC2
BH CV ECHO MEAS - MV DEC TIME: 0.2 SEC
BH CV ECHO MEAS - MV E MAX VEL: 67.7 CM/SEC
BH CV ECHO MEAS - MV E/A: 1.74
BH CV ECHO MEAS - MV MAX PG: 3.4 MMHG
BH CV ECHO MEAS - MV MEAN PG: 0.97 MMHG
BH CV ECHO MEAS - MV P1/2T: 56 MSEC
BH CV ECHO MEAS - MV V2 VTI: 22.4 CM
BH CV ECHO MEAS - MVA(P1/2T): 3.9 CM2
BH CV ECHO MEAS - MVA(VTI): 1.6 CM2
BH CV ECHO MEAS - PA ACC TIME: 0.16 SEC
BH CV ECHO MEAS - PA V2 MAX: 59.7 CM/SEC
BH CV ECHO MEAS - PULM A REVS DUR: 0.1 SEC
BH CV ECHO MEAS - PULM A REVS VEL: 25.7 CM/SEC
BH CV ECHO MEAS - PULM DIAS VEL: 29.1 CM/SEC
BH CV ECHO MEAS - PULM S/D: 1.4
BH CV ECHO MEAS - PULM SYS VEL: 40.7 CM/SEC
BH CV ECHO MEAS - QP/QS: 0.9
BH CV ECHO MEAS - RAP SYSTOLE: 15 MMHG
BH CV ECHO MEAS - RV MAX PG: 0.89 MMHG
BH CV ECHO MEAS - RV V1 MAX: 47.1 CM/SEC
BH CV ECHO MEAS - RV V1 VTI: 12.5 CM
BH CV ECHO MEAS - RVOT DIAM: 1.81 CM
BH CV ECHO MEAS - RVSP: 59 MMHG
BH CV ECHO MEAS - SUP REN AO DIAM: 2 CM
BH CV ECHO MEAS - SV(LVOT): 35.9 ML
BH CV ECHO MEAS - SV(MOD-SP2): 31 ML
BH CV ECHO MEAS - SV(MOD-SP4): 32 ML
BH CV ECHO MEAS - SV(RVOT): 32.2 ML
BH CV ECHO MEAS - SVI(LVOT): 23.7 ML/M2
BH CV ECHO MEAS - SVI(MOD-SP2): 20.4 ML/M2
BH CV ECHO MEAS - SVI(MOD-SP4): 21.1 ML/M2
BH CV ECHO MEAS - TAPSE (>1.6): 2.03 CM
BH CV ECHO MEAS - TR MAX PG: 43.9 MMHG
BH CV ECHO MEAS - TR MAX VEL: 331.2 CM/SEC
BH CV ECHO MEASUREMENTS AVERAGE E/E' RATIO: 7.44
BH CV XLRA - RV BASE: 2.6 CM
BH CV XLRA - RV LENGTH: 5.7 CM
BH CV XLRA - RV MID: 1.68 CM
BH CV XLRA - TDI S': 8.7 CM/SEC
LEFT ATRIUM VOLUME INDEX: 42.6 ML/M2
LV EF BIPLANE MOD: 55.2 %
SINUS: 3.5 CM
STJ: 2.44 CM

## 2025-08-13 PROCEDURE — 93356 MYOCRD STRAIN IMG SPCKL TRCK: CPT

## 2025-08-13 PROCEDURE — 93306 TTE W/DOPPLER COMPLETE: CPT

## (undated) DEVICE — CANNULA SEAL

## (undated) DEVICE — MARKR SKIN W/RULR 2TP

## (undated) DEVICE — REDUCER: Brand: ENDOWRIST

## (undated) DEVICE — 28 FR STRAIGHT – SOFT PVC CATHETER: Brand: PVC THORACIC CATHETERS

## (undated) DEVICE — PATIENT RETURN ELECTRODE, SINGLE-USE, CONTACT QUALITY MONITORING, ADULT, WITH 9FT CORD, FOR PATIENTS WEIGING OVER 33LBS. (15KG): Brand: MEGADYNE

## (undated) DEVICE — ENDOPATH XCEL BLADELESS TROCARS WITH STABILITY SLEEVES: Brand: ENDOPATH XCEL

## (undated) DEVICE — GLV SURG PREMIERPRO ORTHO LTX PF SZ8 BRN

## (undated) DEVICE — KT ORCA ORCAPOD DISP STRL

## (undated) DEVICE — STAPLER SHEATH: Brand: ENDOWRIST

## (undated) DEVICE — GW EMR FIX EXCHG J STD .035 3MM 260CM

## (undated) DEVICE — APPL CHLORAPREP HI/LITE 26ML ORNG

## (undated) DEVICE — LN SMPL CO2 SHTRM SD STREAM W/M LUER

## (undated) DEVICE — SINGLE-USE BIOPSY FORCEPS: Brand: RADIAL JAW 4

## (undated) DEVICE — NDL INJ UROL WILLIAMS CYSTO 3.7F 23G 8MM

## (undated) DEVICE — THE SINGLE USE ETRAP – POLYP TRAP IS USED FOR SUCTION RETRIEVAL OF ENDOSCOPICALLY REMOVED POLYPS.: Brand: ETRAP

## (undated) DEVICE — OASIS DRAIN, SINGLE, INLINE & ATS COMPATIBLE: Brand: OASIS

## (undated) DEVICE — SEAL

## (undated) DEVICE — TUBING, SUCTION, 1/4" X 10', STRAIGHT: Brand: MEDLINE

## (undated) DEVICE — ADAPT CLN BIOGUARD AIR/H2O DISP

## (undated) DEVICE — UNIVERSAL PACK: Brand: CARDINAL HEALTH

## (undated) DEVICE — EXTENSION SET, MALE LUER LOCK ADAPTER WITH RETRACTABLE COLLAR

## (undated) DEVICE — TOTAL TRAY, 16FR 10ML SIL FOLEY, URN: Brand: MEDLINE

## (undated) DEVICE — 2, DISPOSABLE SUCTION/IRRIGATOR WITH DISPOSABLE TIP: Brand: STRYKEFLOW

## (undated) DEVICE — GLV SURG BIOGEL LTX PF 6 1/2

## (undated) DEVICE — SUREFORM 45 CURVED-TIP: Brand: SUREFORM

## (undated) DEVICE — SOL NACL 0.9PCT 1000ML

## (undated) DEVICE — LOU THORACIC: Brand: MEDLINE INDUSTRIES, INC.

## (undated) DEVICE — ADHS SKIN SURG TISS VISC PREMIERPRO EXOFIN HI/VISC FAST/DRY

## (undated) DEVICE — SUT SILK 0 PSL 18IN 580H

## (undated) DEVICE — SNAR POLYP SENSATION STDOVL 27 240 BX40

## (undated) DEVICE — SPNG LAP CIGARETTE KITTNER 5MM STRL PK/5

## (undated) DEVICE — VESSEL LOOPS X-RAY DETECTABLE: Brand: DEROYAL

## (undated) DEVICE — BLADELESS OBTURATOR: Brand: WECK VISTA

## (undated) DEVICE — SENSR O2 OXIMAX FNGR A/ 18IN NONSTR

## (undated) DEVICE — PK CATH CARD 40

## (undated) DEVICE — LAPAROSCOPIC SMOKE FILTRATION SYSTEM: Brand: PALL LAPAROSHIELD® PLUS LAPAROSCOPIC SMOKE FILTRATION SYSTEM

## (undated) DEVICE — GLV SURG SENSICARE PI MIC PF SZ8 LF STRL

## (undated) DEVICE — KT CLN CLEANOR SCPE

## (undated) DEVICE — CATH DIAG IMPULSE FL3.5 5F 100CM

## (undated) DEVICE — KT MANIFLD CARDIAC

## (undated) DEVICE — SYR LUERLOK 20CC BX/50

## (undated) DEVICE — CANN O2 ETCO2 FITS ALL CONN CO2 SMPL A/ 7IN DISP LF

## (undated) DEVICE — ANTIBACTERIAL UNDYED BRAIDED (POLYGLACTIN 910), SYNTHETIC ABSORBABLE SUTURE: Brand: COATED VICRYL

## (undated) DEVICE — SUT SILK 0 TIES 30IN A306H

## (undated) DEVICE — SOL ANTISTICK CAUTRY ELECTROLUBE LF

## (undated) DEVICE — CATH VENT MIV RADL PIG ST TIP 5F 110CM

## (undated) DEVICE — Device: Brand: TISSUE RETRIEVAL SYSTEM

## (undated) DEVICE — GLIDESHEATH BASIC HYDROPHILIC COATED INTRODUCER SHEATH: Brand: GLIDESHEATH

## (undated) DEVICE — 3M™ STERI-DRAPE™ INSTRUMENT POUCH 1018L: Brand: STERI-DRAPE™

## (undated) DEVICE — CATH DIAG IMPULSE FR4 5F 100CM

## (undated) DEVICE — Device

## (undated) DEVICE — ARM DRAPE

## (undated) DEVICE — SYS PERFUS SEP PLATLT W TIPS CUST

## (undated) DEVICE — COLUMN DRAPE

## (undated) DEVICE — TBG INSUFFLATION LUER LOCK: Brand: MEDLINE INDUSTRIES, INC.